# Patient Record
Sex: FEMALE | Race: WHITE | Employment: FULL TIME | URBAN - METROPOLITAN AREA
[De-identification: names, ages, dates, MRNs, and addresses within clinical notes are randomized per-mention and may not be internally consistent; named-entity substitution may affect disease eponyms.]

---

## 2017-01-10 ENCOUNTER — TELEPHONE (OUTPATIENT)
Dept: NURSING | Facility: CLINIC | Age: 47
End: 2017-01-10

## 2017-01-10 NOTE — TELEPHONE ENCOUNTER
"On 2/9/16 pt had: \"HerOption cryo- ablation with the purpose of reducing menstrual flow to a manageable amount or possibly inducing total amenorrhea.\"  Pt calling today stating she gave it almost a year to help with her symptoms. Pt states she still gets very heavy bleeding with her periods with clots, although the pt states her clots have \"lightened up a little.\" Pt states she still gets severe abdominal pain, back pain, and fatigue with her periods as well. Pt states almost every month she is having to work from home due to the heavy menstrual flow and her symptoms and she would \"like to figure something out\" and is wondering what the next step would be to help this. Note routed to Dr. Jackson to review and advise.  "

## 2017-01-10 NOTE — TELEPHONE ENCOUNTER
Called pt, informed of Dr. Jackson's note below. Pt stated understanding and was transferred to scheduling to make an appt.

## 2017-01-25 ENCOUNTER — OFFICE VISIT (OUTPATIENT)
Dept: OBGYN | Facility: CLINIC | Age: 47
End: 2017-01-25
Payer: COMMERCIAL

## 2017-01-25 VITALS
HEART RATE: 72 BPM | BODY MASS INDEX: 32.78 KG/M2 | SYSTOLIC BLOOD PRESSURE: 120 MMHG | DIASTOLIC BLOOD PRESSURE: 78 MMHG | HEIGHT: 63 IN | WEIGHT: 185 LBS

## 2017-01-25 DIAGNOSIS — Z12.4 CERVICAL CANCER SCREENING: ICD-10-CM

## 2017-01-25 DIAGNOSIS — N92.4 EXCESSIVE BLEEDING IN PREMENOPAUSAL PERIOD: Primary | ICD-10-CM

## 2017-01-25 PROCEDURE — G0145 SCR C/V CYTO,THINLAYER,RESCR: HCPCS | Performed by: OBSTETRICS & GYNECOLOGY

## 2017-01-25 PROCEDURE — 87624 HPV HI-RISK TYP POOLED RSLT: CPT | Performed by: OBSTETRICS & GYNECOLOGY

## 2017-01-25 PROCEDURE — 99214 OFFICE O/P EST MOD 30 MIN: CPT | Performed by: OBSTETRICS & GYNECOLOGY

## 2017-01-25 NOTE — PROGRESS NOTES
SUBJECTIVE:                                                   Rosemarie Michel is a 46 year old female who presents to clinic today for the following health issue(s):  Patient presents with:  Consult: Discuss hysterectomy/endometriosis      HPI:  Patient had her option ablation 1 years ago, hasn't helped much. Misses work due to cramps and heavy bleeding  Options now are to attempt mirena insertion using ultrasound guidance or proceed with Laparoscopic supracervical hysterectomy, removal of tubes and powered morcellator  We discussed that procedure in detail today  She isn't too excited about doing an iud.   She may have endometriosis, and vagina is narrow. Pap and hpv done today  Could require open DIANNA if severe adhesions found.   All risks and benefits discussed today.   Can't use ocp due to smoking.   She will let us know which way she wants to go next.    Would leave ovaries unless severe endometriosis found.     Patient's last menstrual period was 2017 (approximate)..   Patient is sexually active, .  Using condoms for contraception.    reports that she has been smoking Cigarettes.  She has smoked for the past 21 years. She does not have any smokeless tobacco history on file.    STD testing offered?  Declined    Health maintenance updated:  yes    Today's PHQ-2 Score:   PHQ-2 (  [employer]) 2017   Q1: Little interest or pleasure in doing things 0   Q2: Feeling down, depressed or hopeless 0   PHQ-2 Score 0     Today's PHQ-9 Score:   PHQ-9 SCORE 2016   Total Score 3     Today's DANII-7 Score:   DANII-7 SCORE 2016   Total Score 0       Problem list and histories reviewed & adjusted, as indicated.  Additional history: as documented.    Patient Active Problem List   Diagnosis     Hyperlipidemia LDL goal <160     Excessive bleeding in premenopausal period     No past surgical history on file.   Social History   Substance Use Topics     Smoking status: Current Every Day Smoker -- 21 years  "    Types: Cigarettes     Smokeless tobacco: Not on file      Comment: 6-7 cigs per day     Alcohol Use: 0.0 oz/week     0 Standard drinks or equivalent per week      Comment:  5 Beers Through Whole Pregnancy      Problem (# of Occurrences) Relation (Name,Age of Onset)    CANCER (1) Father (62): bladder    DIABETES (3) Mother (57): type 2, Father (60): type 2, Brother (42): type 2    Hyperlipidemia (3) Mother, Father, Paternal Grandmother            Current Outpatient Prescriptions   Medication Sig     albuterol (PROAIR HFA/PROVENTIL HFA/VENTOLIN HFA) 108 (90 BASE) MCG/ACT Inhaler Inhale 2 puffs into the lungs every 6 hours as needed for shortness of breath / dyspnea or wheezing     No current facility-administered medications for this visit.     Allergies   Allergen Reactions     Latex      Shellfish Allergy        ROS:  12 point review of systems negative other than symptoms noted below.  Constitutional: Fatigue and Weight Gain  Gastrointestinal: Abdominal Pain and Bloating  Genitourinary: Cramps, Heavy Bleeding with Period, Irregular Menses, Pelvic Pain, Significant PMS and Vaginal Discharge    OBJECTIVE:     /78 mmHg  Pulse 72  Ht 5' 2.75\" (1.594 m)  Wt 185 lb (83.915 kg)  BMI 33.03 kg/m2  LMP 01/12/2017 (Approximate)  Body mass index is 33.03 kg/(m^2).    Exam:  Constitutional:  Appearance: Well nourished, well developed alert, in no acute distress  Chest:  Respiratory Effort:  Breathing unlabored  Cardiovascular: Heart: Auscultation:  Regular rate, normal rhythm, no murmurs present  Breasts:  Inspection of Breasts:  No lymphadenopathy present; Palpation of Breasts and Axillae:  No masses present on palpation, no breast tenderness Axillary Lymph Nodes:  No lymphadenopathy present  Neurologic/Psychiatric:  Mental Status:  Oriented X3   Pelvic Exam:  External Genitalia:     Normal appearance for age, no discharge present, no tenderness present, no inflammatory lesions present, color normal  Vagina:  "    Normal vaginal vault without central or paravaginal defects, no discharge present, no inflammatory lesions present, no masses present  Bladder:     Nontender to palpation  Urethra:   Urethral Body:  Urethra palpation normal, urethra structural support normal   Urethral Meatus:  No erythema or lesions present  Cervix:     Appearance healthy, no lesions present, nontender to palpation, no bleeding present  Uterus:     Nontender to palpation, no masses present, position anteflexed, mobility: normal  Adnexa:     No adnexal tenderness present, no adnexal masses present  Perineum:     Perineum within normal limits, no evidence of trauma, no rashes or skin lesions present  Anus:     Anus within normal limits, no hemorrhoids present  Inguinal Lymph Nodes:     No lymphadenopathy present  Pubic Hair:     Normal pubic hair distribution for age  Genitalia and Groin:     No rashes present, no lesions present, no areas of discoloration, no masses present     In-Clinic Test Results:  No results found for this or any previous visit (from the past 24 hour(s)).    ASSESSMENT/PLAN:                                                        ICD-10-CM    1. Excessive bleeding in premenopausal period N92.4    2. Cervical cancer screening Z12.4 Pap imaged thin layer screen with HPV - recommended age 30 - 65     HPV High Risk Types DNA Cervical       There are no Patient Instructions on file for this visit.    Face to face time 30 minute, more than 50% counceling  MD Yolande Peres MD  [facility]

## 2017-01-25 NOTE — MR AVS SNAPSHOT
After Visit Summary   1/25/2017    Rosemarie Michel    MRN: [id number]           Patient Information     Date Of Birth          1970        Visit Information        Provider Department      1/25/2017 10:30 AM Yolande Jackson MD [facility]        Today's Diagnoses     Excessive bleeding in premenopausal period    -  1     Cervical cancer screening            Follow-ups after your visit        Your next 10 appointments already scheduled     Feb 15, 2017 10:00 AM   PHYSICAL with Yolande Jackson MD   [facility] ([facility])    [address] [place] [address] [address]  [facility] [address]   [phone]            Feb 15, 2017 10:30 AM   MA SCREENING DIGITAL BILATERAL with WEMA1   [facility] ([facility])    [address] [place] [address] [address]  [facility] [address] [phone]           Do not use any powder, lotion or deodorant under your arms or on your breast. If you do, we will ask you to remove it before your exam.  Wear comfortable, two-piece clothing.  If you have any allergies, tell your care team.  Bring any previous mammograms from other facilities or have them mailed to the breast center.              Who to contact     If you have questions or need follow up information about today's clinic visit or your schedule please contact [facility] directly at [phone].  Normal or non-critical lab and imaging results will be communicated to you by MyChart, letter or phone within 4 business days after the clinic has received the results. If you do not hear from us within 7 days, please contact the clinic through MyChart or phone. If you have a critical or abnormal lab result, we will notify you by phone as soon as possible.  Submit refill requests through [employer] or call your pharmacy and they will forward the refill request to us. Please allow 3 business days for your  "refill to be completed.          Additional Information About Your Visit        [employer] Information     [employer] gives you secure access to your electronic health record. If you see a primary care provider, you can also send messages to your care team and make appointments. If you have questions, please call your primary care clinic.  If you do not have a primary care provider, please call [phone] and they will assist you.        Care EveryWhere ID     This is your Care EveryWhere ID. This could be used by other organizations to access your [facility] medical records  [id number]        Your Vitals Were     Pulse Height BMI (Body Mass Index) Last Period          72 5' 2.75\" (1.594 m) 33.03 kg/m2 01/12/2017 (Approximate)         Blood Pressure from Last 3 Encounters:   01/25/17 120/78   12/02/16 128/72   10/21/16 128/80    Weight from Last 3 Encounters:   01/25/17 185 lb (83.915 kg)   12/02/16 185 lb (83.915 kg)   10/21/16 184 lb (83.462 kg)              We Performed the Following     HPV High Risk Types DNA Cervical     Pap imaged thin layer screen with HPV - recommended age 30 - 65        Primary Care Provider Office Phone # Fax #    Citlali Newsome PA-C [phone]       [place] [id number] [place] [facility] [address]        Thank you!     Thank you for choosing [facility] [place]  for your care. Our goal is always to provide you with excellent care. Hearing back from our patients is one way we can continue to improve our services. Please take a few minutes to complete the written survey that you may receive in the mail after your visit with us. Thank you!             Your Updated Medication List - Protect others around you: Learn how to safely use, store and throw away your medicines at www.disposemymeds.org.          This list is accurate as of: 1/25/17 12:30 PM.  Always use your most recent med list.                   Brand Name Dispense Instructions for use    " albuterol 108 (90 BASE) MCG/ACT Inhaler    PROAIR HFA/PROVENTIL HFA/VENTOLIN HFA    1 Inhaler    Inhale 2 puffs into the lungs every 6 hours as needed for shortness of breath / dyspnea or wheezing

## 2017-01-27 LAB
COPATH REPORT: NORMAL
PAP: NORMAL

## 2017-01-30 LAB
FINAL DIAGNOSIS: NORMAL
HPV HR 12 DNA CVX QL NAA+PROBE: NEGATIVE
HPV16 DNA SPEC QL NAA+PROBE: NEGATIVE
HPV18 DNA SPEC QL NAA+PROBE: NEGATIVE
SPECIMEN DESCRIPTION: NORMAL

## 2017-07-29 ENCOUNTER — HEALTH MAINTENANCE LETTER (OUTPATIENT)
Age: 47
End: 2017-07-29

## 2017-10-06 ENCOUNTER — OFFICE VISIT (OUTPATIENT)
Dept: FAMILY MEDICINE | Facility: CLINIC | Age: 47
End: 2017-10-06
Payer: COMMERCIAL

## 2017-10-06 DIAGNOSIS — R35.0 URINARY FREQUENCY: ICD-10-CM

## 2017-10-06 DIAGNOSIS — K25.3 ACUTE GASTRIC ULCER, UNSPECIFIED WHETHER GASTRIC ULCER HEMORRHAGE OR PERFORATION PRESENT: Primary | ICD-10-CM

## 2017-10-06 LAB
ALBUMIN UR-MCNC: NEGATIVE MG/DL
APPEARANCE UR: CLEAR
BACTERIA #/AREA URNS HPF: ABNORMAL /HPF
BILIRUB UR QL STRIP: NEGATIVE
COLOR UR AUTO: YELLOW
GLUCOSE UR STRIP-MCNC: NEGATIVE MG/DL
HGB BLD-MCNC: 14.9 G/DL (ref 11.7–15.7)
HGB UR QL STRIP: NEGATIVE
KETONES UR STRIP-MCNC: NEGATIVE MG/DL
LEUKOCYTE ESTERASE UR QL STRIP: ABNORMAL
NITRATE UR QL: NEGATIVE
NON-SQ EPI CELLS #/AREA URNS LPF: ABNORMAL /LPF
PH UR STRIP: 5.5 PH (ref 5–7)
RBC #/AREA URNS AUTO: ABNORMAL /HPF
SOURCE: ABNORMAL
SP GR UR STRIP: 1.02 (ref 1–1.03)
UROBILINOGEN UR STRIP-ACNC: 0.2 EU/DL (ref 0.2–1)
WBC #/AREA URNS AUTO: ABNORMAL /HPF

## 2017-10-06 PROCEDURE — 85018 HEMOGLOBIN: CPT | Performed by: PHYSICIAN ASSISTANT

## 2017-10-06 PROCEDURE — 81001 URINALYSIS AUTO W/SCOPE: CPT | Performed by: PHYSICIAN ASSISTANT

## 2017-10-06 PROCEDURE — 36415 COLL VENOUS BLD VENIPUNCTURE: CPT | Performed by: PHYSICIAN ASSISTANT

## 2017-10-06 PROCEDURE — 80053 COMPREHEN METABOLIC PANEL: CPT | Performed by: PHYSICIAN ASSISTANT

## 2017-10-06 PROCEDURE — 99213 OFFICE O/P EST LOW 20 MIN: CPT | Performed by: PHYSICIAN ASSISTANT

## 2017-10-06 RX ORDER — OMEPRAZOLE 40 MG/1
40 CAPSULE, DELAYED RELEASE ORAL DAILY
Qty: 90 CAPSULE | Refills: 1 | Status: SHIPPED | OUTPATIENT
Start: 2017-10-06 | End: 2018-02-14

## 2017-10-06 NOTE — NURSING NOTE
"Chief Complaint   Patient presents with     Abdominal Pain       Initial BP (!) 152/102  Pulse 90  Temp 98.7  F (37.1  C) (Tympanic)  Ht 5' 2.75\" (1.594 m)  Wt 181 lb (82.1 kg)  BMI 32.32 kg/m2 Estimated body mass index is 32.32 kg/(m^2) as calculated from the following:    Height as of this encounter: 5' 2.75\" (1.594 m).    Weight as of this encounter: 181 lb (82.1 kg).  Medication Reconciliation: complete     Sotero Jolly CMA    "

## 2017-10-06 NOTE — PROGRESS NOTES
"  SUBJECTIVE:   Rosemarie Michel is a 47 year old female who presents to clinic today for the following health issues:      ABDOMINAL   PAIN     Onset: 1 month     Description:   Character: Sharp and twisting pain at times, almost like a hunger pain, she sometimes will get spasms as well   Location: Right in the middle of her abdomen  Radiation: None    Intensity: moderate    Progression of Symptoms:  Worsening with the sharpness     Accompanying Signs & Symptoms:  Fever/Chills?: no   Gas/Bloating: YES- both   Nausea: no   Vomitting: no   Diarrhea?: YES- off and on   Constipation:YES- off and on   Dysuria or Hematuria: YES- feeling like she has to go all the time but then not much will come out    History:   Trauma: no   Previous similar pain: no    Previous tests done: none    Precipitating factors:   Does the pain change with:     Food: YES- the pain goes away with eating     BM: no     Urination: no     Alleviating factors:  None     Therapies Tried and outcome: Otc gas pills     LMP:  About 3 weeks        For the past 1 month has had increasing abdominal pain   Describes pain as a gnawing or twisting in the middle of her stomach   Stools have been \"all over the place\" - some loose, some firm, some black and others light brown  Having lots of gas and bloating  Food seems to actually alleviate the pain for a little while but says she \"can't eat every 20 minutes\"   No n/v  Had a similar pain she remembers 10+ years ago - was given a medication but can't remember what it was    Denies any lightheadedness or dizziness    She does take ibuprofen - has issues with her neck   Takes 3-4 ibuprofen at a time, sometimes 4 or 5 times/day  Recently had a MRI and saw a spine MD - has had injections which have helped alleviate some of the pain  Going to start an \"intense\" rehab program for her neck        Problem list and histories reviewed & adjusted, as indicated.  Additional history: as documented    Current Outpatient " "Prescriptions   Medication Sig Dispense Refill     omeprazole (PRILOSEC) 40 MG capsule Take 1 capsule (40 mg) by mouth daily Take 30-60 minutes before a meal. 90 capsule 1     ranitidine (ZANTAC) 300 MG tablet Take 1 tablet (300 mg) by mouth At Bedtime 30 tablet 1     Allergies   Allergen Reactions     Latex      Shellfish Allergy        Reviewed and updated as needed this visit by clinical staffTobacco  Allergies  Meds  Med Hx  Surg Hx  Fam Hx  Soc Hx      Reviewed and updated as needed this visit by Provider         ROS:  Remainder of ROS obtained and found to be negative other than that which was documented above      OBJECTIVE:     /88  Pulse 90  Temp 98.7  F (37.1  C) (Tympanic)  Ht 5' 2.75\" (1.594 m)  Wt 181 lb (82.1 kg)  BMI 32.32 kg/m2  Body mass index is 32.32 kg/(m^2).  GENERAL: healthy, alert and no distress  EYES: Eyes grossly normal to inspection  RESP: lungs clear to auscultation - no rales, rhonchi or wheezes  CV: regular rates and rhythm, normal S1 S2, no S3 or S4 and no murmur, click or rub  ABDOMEN: bowel sounds normal, no palpable masses. Tender over epigastric abdomen and upper abdomen, nontender over lower abdomen. No rebound    Diagnostic Test Results:  Labs    UA RESULTS:  Recent Labs   Lab Test  10/06/17   1344   COLOR  Yellow   APPEARANCE  Clear   URINEGLC  Negative   URINEBILI  Negative   URINEKETONE  Negative   SG  1.020   UBLD  Negative   URINEPH  5.5   PROTEIN  Negative   UROBILINOGEN  0.2   NITRITE  Negative   LEUKEST  Trace*   RBCU  O - 2   WBCU  O - 2         ASSESSMENT/PLAN:       ICD-10-CM    1. Acute gastric ulcer, unspecified whether gastric ulcer hemorrhage or perforation present K25.3 omeprazole (PRILOSEC) 40 MG capsule     ranitidine (ZANTAC) 300 MG tablet     Comprehensive metabolic panel (BMP + Alb, Alk Phos, ALT, AST, Total. Bili, TP)     Hemoglobin     H Pylori antigen, stool   2. Urinary frequency R35.0 *UA reflex to Microscopic and Culture ([facility] " [facility] (except [place] and [place])     Urine Microscopic     High suspicion for gastritis/gastric ulcer given history; especially concerning is her amount of ibuprofen use which is a likely contributor. She is starting rehab and has done injections so we discussed working on at the very least cutting back on the ibuprofen substantially  Will start her on prilosec and zantac with low threshold to obtain EGD  Patient to follow up in 2-3 weeks    Mervat Ulloa PA-C  [facility]      Patient Instructions   Start both the zantac and prilosec -    **Take 300mg of zantac at bedtime  **Take the 40mg of prilosec in the morning      Continue both for the first 2 weeks. If symptoms improved at 2 weeks, can stop the zantac but continue the prilosec    Cut back on the ibuprofen (advil) as much as possible      If no symptom improvement OR if any worsening, let me know right away

## 2017-10-06 NOTE — MR AVS SNAPSHOT
After Visit Summary   10/6/2017    Rosemarie Michel    MRN: [id number]           Patient Information     Date Of Birth          1970        Visit Information        Provider Department      10/6/2017 1:40 PM Mervat Ulloa PA-C [facility]        Today's Diagnoses     Urinary frequency    -  1    Acute gastric ulcer, unspecified whether gastric ulcer hemorrhage or perforation present          Care Instructions    Start both the zantac and prilosec -    **Take 300mg of zantac at bedtime  **Take the 40mg of prilosec in the morning      Continue both for the first 2 weeks. If symptoms improved at 2 weeks, can stop the zantac but continue the prilosec    Cut back on the ibuprofen (advil) as much as possible      If no symptom improvement OR if any worsening, let me know right away          Follow-ups after your visit        Who to contact     Normal or non-critical lab and imaging results will be communicated to you by [employer], letter or phone within 4 business days after the clinic has received the results. If you do not hear from us within 7 days, please contact the clinic through [employer] or phone. If you have a critical or abnormal lab result, we will notify you by phone as soon as possible.  Submit refill requests through [employer] or call your pharmacy and they will forward the refill request to us. Please allow 3 business days for your refill to be completed.          If you need to speak with a  for additional information , please call: [phone]             Additional Information About Your Visit        [employer] Information     [employer] gives you secure access to your electronic health record. If you see a primary care provider, you can also send messages to your care team and make appointments. If you have questions, please call your primary care clinic.  If you do not have a primary care provider, please call [phone] and they will assist you.    "     Care EveryWhere ID     This is your Care EveryWhere ID. This could be used by other organizations to access your [facility] medical records  [id number]        Your Vitals Were     Pulse Temperature Height BMI (Body Mass Index)          90 98.7  F (37.1  C) (Tympanic) 5' 2.75\" (1.594 m) 32.32 kg/m2         Blood Pressure from Last 3 Encounters:   10/06/17 (!) 152/102   01/25/17 120/78   12/02/16 128/72    Weight from Last 3 Encounters:   10/06/17 181 lb (82.1 kg)   01/25/17 185 lb (83.9 kg)   12/02/16 185 lb (83.9 kg)              We Performed the Following     *UA reflex to Microscopic and Culture ([facility] (except [place] and [place])     Urine Microscopic          Today's Medication Changes          These changes are accurate as of: 10/6/17  2:19 PM.  If you have any questions, ask your nurse or doctor.               Start taking these medicines.        Dose/Directions    omeprazole 40 MG capsule   Commonly known as:  priLOSEC   Used for:  Acute gastric ulcer, unspecified whether gastric ulcer hemorrhage or perforation present   Started by:  Mervat Ulloa PA-C        Dose:  40 mg   Take 1 capsule (40 mg) by mouth daily Take 30-60 minutes before a meal.   Quantity:  90 capsule   Refills:  1       ranitidine 300 MG tablet   Commonly known as:  ZANTAC   Used for:  Acute gastric ulcer, unspecified whether gastric ulcer hemorrhage or perforation present   Started by:  Mervat Ulloa PA-C        Dose:  300 mg   Take 1 tablet (300 mg) by mouth At Bedtime   Quantity:  30 tablet   Refills:  1            Where to get your medicines      These medications were sent to [facility] - [address]  [address] [place] [address]     Phone:  [phone]     omeprazole 40 MG capsule    ranitidine 300 MG tablet                Primary Care Provider Office Phone # Fax #    Citlali Newsome PA-C [phone]       " [address] [place] [facility] [address]        Equal Access to Services     OSCAR SCOTT : Hadii aad ku hadbetsyjenn Moran, waaxda luwillieadaha, qaybta florlewemily florez, juan f zavalagonzaloblanca mckeon. So [facility] [phone].    ATENCIÓN: Si habla español, tiene a holliday disposición servicios gratuitos de asistencia lingüística. Llame al [phone].    We comply with applicable federal civil rights laws and [place] laws. We do not discriminate on the basis of race, color, national origin, age, disability, sex, sexual orientation, or gender identity.            Thank you!     Thank you for choosing [facility]  for your care. Our goal is always to provide you with excellent care. Hearing back from our patients is one way we can continue to improve our services. Please take a few minutes to complete the written survey that you may receive in the mail after your visit with us. Thank you!             Your Updated Medication List - Protect others around you: Learn how to safely use, store and throw away your medicines at www.disposemymeds.org.          This list is accurate as of: 10/6/17  2:19 PM.  Always use your most recent med list.                   Brand Name Dispense Instructions for use Diagnosis    omeprazole 40 MG capsule    priLOSEC    90 capsule    Take 1 capsule (40 mg) by mouth daily Take 30-60 minutes before a meal.    Acute gastric ulcer, unspecified whether gastric ulcer hemorrhage or perforation present       ranitidine 300 MG tablet    ZANTAC    30 tablet    Take 1 tablet (300 mg) by mouth At Bedtime    Acute gastric ulcer, unspecified whether gastric ulcer hemorrhage or perforation present

## 2017-10-06 NOTE — PATIENT INSTRUCTIONS
Start both the zantac and prilosec -    **Take 300mg of zantac at bedtime  **Take the 40mg of prilosec in the morning      Continue both for the first 2 weeks. If symptoms improved at 2 weeks, can stop the zantac but continue the prilosec    Cut back on the ibuprofen (advil) as much as possible      If no symptom improvement OR if any worsening, let me know right away

## 2017-10-07 VITALS
BODY MASS INDEX: 32.07 KG/M2 | HEIGHT: 63 IN | DIASTOLIC BLOOD PRESSURE: 88 MMHG | WEIGHT: 181 LBS | TEMPERATURE: 98.7 F | SYSTOLIC BLOOD PRESSURE: 142 MMHG | HEART RATE: 90 BPM

## 2017-10-07 LAB
ALBUMIN SERPL-MCNC: 4.2 G/DL (ref 3.4–5)
ALP SERPL-CCNC: 66 U/L (ref 40–150)
ALT SERPL W P-5'-P-CCNC: 22 U/L (ref 0–50)
ANION GAP SERPL CALCULATED.3IONS-SCNC: 6 MMOL/L (ref 3–14)
AST SERPL W P-5'-P-CCNC: 18 U/L (ref 0–45)
BILIRUB SERPL-MCNC: 0.4 MG/DL (ref 0.2–1.3)
BUN SERPL-MCNC: 13 MG/DL (ref 7–30)
CALCIUM SERPL-MCNC: 8.9 MG/DL (ref 8.5–10.1)
CHLORIDE SERPL-SCNC: 103 MMOL/L (ref 94–109)
CO2 SERPL-SCNC: 26 MMOL/L (ref 20–32)
CREAT SERPL-MCNC: 0.6 MG/DL (ref 0.52–1.04)
GFR SERPL CREATININE-BSD FRML MDRD: >90 ML/MIN/1.7M2
GLUCOSE SERPL-MCNC: 80 MG/DL (ref 70–99)
POTASSIUM SERPL-SCNC: 4.1 MMOL/L (ref 3.4–5.3)
PROT SERPL-MCNC: 7.7 G/DL (ref 6.8–8.8)
SODIUM SERPL-SCNC: 135 MMOL/L (ref 133–144)

## 2017-10-09 DIAGNOSIS — K25.3 ACUTE GASTRIC ULCER, UNSPECIFIED WHETHER GASTRIC ULCER HEMORRHAGE OR PERFORATION PRESENT: ICD-10-CM

## 2017-10-09 PROCEDURE — 87338 HPYLORI STOOL AG IA: CPT | Performed by: PHYSICIAN ASSISTANT

## 2017-10-10 LAB
H PYLORI AG STL QL IA: NORMAL
SPECIMEN SOURCE: NORMAL

## 2017-10-20 ENCOUNTER — TELEPHONE (OUTPATIENT)
Dept: FAMILY MEDICINE | Facility: CLINIC | Age: 47
End: 2017-10-20

## 2017-10-20 NOTE — TELEPHONE ENCOUNTER
Patient reports:  She was prescribed two medications 10/6/17 for her ulcer  She can not recall what she was supposed to stop taking after a few weeks    Notified patient of notes for patient instructions OV 10/6/17:  Patient Instructions   Start both the zantac and prilosec -     **Take 300mg of zantac at bedtime  **Take the 40mg of prilosec in the morning        Continue both for the first 2 weeks. If symptoms improved at 2 weeks, can stop the zantac but continue the prilosec     Cut back on the ibuprofen (advil) as much as possible        If no symptom improvement OR if any worsening, let me know right away    Patient verbalized understanding and reports her symptoms are much better  She is going to stop taking the Zantac  She will continue using prilosec  She will report back to the clinic in the next week or two to let the provider know how she is doing.    Marlen ROLDAN Rn

## 2017-10-20 NOTE — TELEPHONE ENCOUNTER
Reason for Call:  Other prescription    Detailed comments: Rosemarie LEFT MESSAGE:  She states that she was prescripted 2 medications (did not state what they were) and she believes she was suppose to stop taking one of them and then wondering if she is suppose to continue with the other one (?).  Please call and assess. Thank you..Eliane Fierro    Phone Number Patient can be reached at: Home number on file [phone] (home)    Best Time: did not specify in message    Can we leave a detailed message on this number? did not specify in message    Call taken on 10/20/2017 at 1:48 PM by Eliane Fierro

## 2017-10-26 ENCOUNTER — OFFICE VISIT (OUTPATIENT)
Dept: FAMILY MEDICINE | Facility: CLINIC | Age: 47
End: 2017-10-26
Payer: COMMERCIAL

## 2017-10-26 VITALS
BODY MASS INDEX: 33.79 KG/M2 | TEMPERATURE: 99.9 F | HEART RATE: 92 BPM | SYSTOLIC BLOOD PRESSURE: 132 MMHG | WEIGHT: 190.7 LBS | HEIGHT: 63 IN | DIASTOLIC BLOOD PRESSURE: 82 MMHG

## 2017-10-26 DIAGNOSIS — H81.11 BENIGN PAROXYSMAL POSITIONAL VERTIGO OF RIGHT EAR: Primary | ICD-10-CM

## 2017-10-26 LAB — HGB BLD-MCNC: 14.4 G/DL (ref 11.7–15.7)

## 2017-10-26 PROCEDURE — 99214 OFFICE O/P EST MOD 30 MIN: CPT | Performed by: PHYSICIAN ASSISTANT

## 2017-10-26 PROCEDURE — 85018 HEMOGLOBIN: CPT | Performed by: PHYSICIAN ASSISTANT

## 2017-10-26 PROCEDURE — 36415 COLL VENOUS BLD VENIPUNCTURE: CPT | Performed by: PHYSICIAN ASSISTANT

## 2017-10-26 PROCEDURE — 80048 BASIC METABOLIC PNL TOTAL CA: CPT | Performed by: PHYSICIAN ASSISTANT

## 2017-10-26 PROCEDURE — 92567 TYMPANOMETRY: CPT | Performed by: PHYSICIAN ASSISTANT

## 2017-10-26 RX ORDER — FLUTICASONE PROPIONATE 50 MCG
1-2 SPRAY, SUSPENSION (ML) NASAL DAILY
Qty: 1 BOTTLE | Refills: 1 | Status: SHIPPED | OUTPATIENT
Start: 2017-10-26 | End: 2018-01-19

## 2017-10-26 NOTE — PATIENT INSTRUCTIONS
start flonase  See physical therapy  Follow up if worsening, not improving  Benign Paroxysmal Positional Vertigo     Your health care provider may move your head in certain ways to treat your BPPV.     Benign paroxysmal positional vertigo (BPPV) is a problem with the inner ear. The inner ear contains the vestibular system. This system is what helps you keep your balance. BPPV causes a feeling of spinning. It is a common problem of the vestibular system.  Understanding the vestibular system  The vestibular system of the ear is made up of very tiny parts. They include the utricle, saccule, and semicircular canals. The utricle is a tiny organ that contains calcium crystals. In some people, the crystals can move into the semicircular canals. When this happens, the system no longer works as it should. This causes BPPV. Benign means it is not life threatening. Paroxysmal means it happens suddenly. Positional means that it happens when you move your head. Vertigo is a feeling of spinning.  What causes BPPV?  Causes include injury to your head or neck. Other problems with the vestibular system may cause BPPV. In many people, the cause of BPPV is not known.  Symptoms of BPPV  You many have repeated feelings of spinning (vertigo). The vertigo usually lasts less than 1 minute. Some movements, such as rolling over in bed, can bring on vertigo.  Diagnosing BPPV  Your primary healthcare provider may diagnose and treat your BPPV. Or you may see an ear, nose, and throat doctor (otolaryngologist). In some cases, you may see a nervous system doctor (neurologist).  The healthcare provider will ask about your symptoms and your medical history. He or she will examine you. You may have hearing and balance tests. As part of the exam, your healthcare provider may have you move your head and body in certain ways. If you have BPPV, the movements can bring on vertigo. Your provider will also look for abnormal movements of your eyes. You may  have other tests to check your vestibular or nervous systems.  Treatment for BPPV  Your healthcare provider may try to move the calcium crystals. This is done by having you move your head and neck in certain ways. This treatment is safe and often works well. You may also be told to do these movements at home. You may still have vertigo for a few weeks. Your healthcare provider will recheck your symptoms, usually in about a month. Special physical therapy may also be part of treatment. In rare cases, surgery may be needed for BPPV that does not go away.     When to call the healthcare provider  Call your healthcare provider right away if you have any of these:    Symptoms that do not go away with treatment    Symptoms that get worse    New symptoms   Date Last Reviewed: 5/1/2017 2000-2017 [employer]. [address] [place] [address], [place] [address]. All rights reserved. This information is not intended as a substitute for professional medical care. Always follow your healthcare professional's instructions.

## 2017-10-26 NOTE — MR AVS SNAPSHOT
After Visit Summary   10/26/2017    Rosemarie Michel    MRN: [id number]           Patient Information     Date Of Birth          1970        Visit Information        Provider Department      10/26/2017 3:20 PM Mile Sanchez PA-C [facility]        Today's Diagnoses     Benign paroxysmal positional vertigo of right ear    -  1      Care Instructions    start flonase  See physical therapy  Follow up if worsening, not improving  Benign Paroxysmal Positional Vertigo     Your health care provider may move your head in certain ways to treat your BPPV.     Benign paroxysmal positional vertigo (BPPV) is a problem with the inner ear. The inner ear contains the vestibular system. This system is what helps you keep your balance. BPPV causes a feeling of spinning. It is a common problem of the vestibular system.  Understanding the vestibular system  The vestibular system of the ear is made up of very tiny parts. They include the utricle, saccule, and semicircular canals. The utricle is a tiny organ that contains calcium crystals. In some people, the crystals can move into the semicircular canals. When this happens, the system no longer works as it should. This causes BPPV. Benign means it is not life threatening. Paroxysmal means it happens suddenly. Positional means that it happens when you move your head. Vertigo is a feeling of spinning.  What causes BPPV?  Causes include injury to your head or neck. Other problems with the vestibular system may cause BPPV. In many people, the cause of BPPV is not known.  Symptoms of BPPV  You many have repeated feelings of spinning (vertigo). The vertigo usually lasts less than 1 minute. Some movements, such as rolling over in bed, can bring on vertigo.  Diagnosing BPPV  Your primary healthcare provider may diagnose and treat your BPPV. Or you may see an ear, nose, and throat doctor (otolaryngologist). In some cases, you may see a nervous system doctor  (neurologist).  The healthcare provider will ask about your symptoms and your medical history. He or she will examine you. You may have hearing and balance tests. As part of the exam, your healthcare provider may have you move your head and body in certain ways. If you have BPPV, the movements can bring on vertigo. Your provider will also look for abnormal movements of your eyes. You may have other tests to check your vestibular or nervous systems.  Treatment for BPPV  Your healthcare provider may try to move the calcium crystals. This is done by having you move your head and neck in certain ways. This treatment is safe and often works well. You may also be told to do these movements at home. You may still have vertigo for a few weeks. Your healthcare provider will recheck your symptoms, usually in about a month. Special physical therapy may also be part of treatment. In rare cases, surgery may be needed for BPPV that does not go away.     When to call the healthcare provider  Call your healthcare provider right away if you have any of these:    Symptoms that do not go away with treatment    Symptoms that get worse    New symptoms   Date Last Reviewed: 5/1/2017 2000-2017 [employer]. [address] [place] [address]. All rights reserved. This information is not intended as a substitute for professional medical care. Always follow your healthcare professional's instructions.                Follow-ups after your visit        Additional Services     [facility] PT, HAND, AND CHIROPRACTIC REFERRAL       **This order will print in the [facility] Scheduling Office**    Physical Therapy, Hand Therapy and Chiropractic Care are available through:    *[facility]  *[facility]  *[facility]    Call one number to schedule at any of the above locations: [phone].    Your provider has referred you to: Physical Therapy at [facility] or [facility] [place] [place]    Indication/Reason for  Referral: bppv/dizziness  Onset of Illness: 2 weeks. Has had flares each year  Therapy Orders: Evaluate and Treat  Special Programs: None and treat BPPV  Special Request: None    Deandra Liriano      Additional Comments for the Therapist or Chiropractor: none    Please be aware that coverage of these services is subject to the terms and limitations of your health insurance plan.  Call member services at your health plan with any benefit or coverage questions.      Please bring the following to your appointment:    *Your personal calendar for scheduling future appointments  *Comfortable clothing                  Who to contact     Normal or non-critical lab and imaging results will be communicated to you by [employer], letter or phone within 4 business days after the clinic has received the results. If you do not hear from us within 7 days, please contact the clinic through [employer] or phone. If you have a critical or abnormal lab result, we will notify you by phone as soon as possible.  Submit refill requests through [employer] or call your pharmacy and they will forward the refill request to us. Please allow 3 business days for your refill to be completed.          If you need to speak with a  for additional information , please call: [phone]             Additional Information About Your Visit        [employer] Information     [employer] gives you secure access to your electronic health record. If you see a primary care provider, you can also send messages to your care team and make appointments. If you have questions, please call your primary care clinic.  If you do not have a primary care provider, please call [phone] and they will assist you.        Care EveryWhere ID     This is your Care EveryWhere ID. This could be used by other organizations to access your [facility] medical records  [id number]        Your Vitals Were     Pulse Temperature Height Last Period BMI (Body Mass Index)       92 99.9  " F (37.7  C) (Tympanic) 5' 2.75\" (1.594 m) 10/01/2017 34.05 kg/m2        Blood Pressure from Last 3 Encounters:   10/26/17 132/82   10/07/17 142/88   01/25/17 120/78    Weight from Last 3 Encounters:   10/26/17 190 lb 11.2 oz (86.5 kg)   10/06/17 181 lb (82.1 kg)   01/25/17 185 lb (83.9 kg)              We Performed the Following     Basic metabolic panel     Hemoglobin     [facility] PT, HAND, AND CHIROPRACTIC REFERRAL          Today's Medication Changes          These changes are accurate as of: 10/26/17  4:30 PM.  If you have any questions, ask your nurse or doctor.               Start taking these medicines.        Dose/Directions    fluticasone 50 MCG/ACT spray   Commonly known as:  FLONASE   Used for:  Benign paroxysmal positional vertigo of right ear   Started by:  Mile Sanchez PA-C        Dose:  1-2 spray   Spray 1-2 sprays into both nostrils daily   Quantity:  1 Bottle   Refills:  1            Where to get your medicines      These medications were sent to [facility] [place] - [address] [facility] [address]  [address] [facility] [place] [address]     Phone:  [phone]     fluticasone 50 MCG/ACT spray                Primary Care Provider Office Phone # Fax #    Citlali Aury Newsome PA-C [phone] [address] [place] [address]        Equal Access to Services     [facility]: Hadii aad ku hadasho Soomaali, waaxda luqadaha, qaybta kaalmada adeegyada, waxay brandy mckeon. So [facility] [phone].    ATENCIÓN: Si habla español, tiene a holliday disposición servicios gratuitos de asistencia lingüística. Llame al [phone].    We comply with applicable federal civil rights laws and [place] laws. We do not discriminate on the basis of race, color, national origin, age, disability, sex, sexual orientation, or gender identity.            Thank you!     Thank you for choosing [facility]  for your care. Our goal is always to provide you with " excellent care. Hearing back from our patients is one way we can continue to improve our services. Please take a few minutes to complete the written survey that you may receive in the mail after your visit with us. Thank you!             Your Updated Medication List - Protect others around you: Learn how to safely use, store and throw away your medicines at www.disposemymeds.org.          This list is accurate as of: 10/26/17  4:30 PM.  Always use your most recent med list.                   Brand Name Dispense Instructions for use Diagnosis    fluticasone 50 MCG/ACT spray    FLONASE    1 Bottle    Spray 1-2 sprays into both nostrils daily    Benign paroxysmal positional vertigo of right ear       omeprazole 40 MG capsule    priLOSEC    90 capsule    Take 1 capsule (40 mg) by mouth daily Take 30-60 minutes before a meal.    Acute gastric ulcer, unspecified whether gastric ulcer hemorrhage or perforation present
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
## 2017-10-27 ENCOUNTER — THERAPY VISIT (OUTPATIENT)
Dept: PHYSICAL THERAPY | Facility: CLINIC | Age: 47
End: 2017-10-27
Payer: COMMERCIAL

## 2017-10-27 DIAGNOSIS — H81.10 BPPV (BENIGN PAROXYSMAL POSITIONAL VERTIGO): Primary | ICD-10-CM

## 2017-10-27 LAB
ANION GAP SERPL CALCULATED.3IONS-SCNC: 6 MMOL/L (ref 3–14)
BUN SERPL-MCNC: 13 MG/DL (ref 7–30)
CALCIUM SERPL-MCNC: 9.1 MG/DL (ref 8.5–10.1)
CHLORIDE SERPL-SCNC: 103 MMOL/L (ref 94–109)
CO2 SERPL-SCNC: 26 MMOL/L (ref 20–32)
CREAT SERPL-MCNC: 0.73 MG/DL (ref 0.52–1.04)
GFR SERPL CREATININE-BSD FRML MDRD: 86 ML/MIN/1.7M2
GLUCOSE SERPL-MCNC: 98 MG/DL (ref 70–99)
POTASSIUM SERPL-SCNC: 4.2 MMOL/L (ref 3.4–5.3)
SODIUM SERPL-SCNC: 135 MMOL/L (ref 133–144)

## 2017-10-27 PROCEDURE — 97161 PT EVAL LOW COMPLEX 20 MIN: CPT | Mod: GP | Performed by: PHYSICAL THERAPIST

## 2017-10-27 PROCEDURE — 97112 NEUROMUSCULAR REEDUCATION: CPT | Mod: GP | Performed by: PHYSICAL THERAPIST

## 2017-10-27 NOTE — MR AVS SNAPSHOT
After Visit Summary   10/27/2017    Rosemarie Michel    MRN: [id number]           Patient Information     Date Of Birth          1970        Visit Information        Provider Department      10/27/2017 10:50 AM Kasi Parkinson PT [facility]        Today's Diagnoses     BPPV (benign paroxysmal positional vertigo)    -  1       Follow-ups after your visit        Your next 10 appointments already scheduled     Oct 30, 2017  9:40 AM CDT   [facility] Spine with Anna Ulloa PT   [facility]    [address] [place] [id number] [phone]              Who to contact     If you have questions or need follow up information about today's clinic visit or your schedule please contact [facility] directly at [phone].  Normal or non-critical lab and imaging results will be communicated to you by [employer], letter or phone within 4 business days after the clinic has received the results. If you do not hear from us within 7 days, please contact the clinic through MyChart or phone. If you have a critical or abnormal lab result, we will notify you by phone as soon as possible.  Submit refill requests through [employer] or call your pharmacy and they will forward the refill request to us. Please allow 3 business days for your refill to be completed.          Additional Information About Your Visit        MyChart Information     [employer] gives you secure access to your electronic health record. If you see a primary care provider, you can also send messages to your care team and make appointments. If you have questions, please call your primary care clinic.  If you do not have a primary care provider, please call [phone] and they will assist you.        Care EveryWhere ID     This is your Care EveryWhere ID. This could be used by other organizations to access your [facility] medical records  [id number]        Your Vitals Were     Last Period                    10/01/2017            Blood Pressure from Last 3 Encounters:   10/26/17 132/82   10/07/17 142/88   01/25/17 120/78    Weight from Last 3 Encounters:   10/26/17 86.5 kg (190 lb 11.2 oz)   10/06/17 82.1 kg (181 lb)   01/25/17 83.9 kg (185 lb)              We Performed the Following     HC PT EVAL, LOW COMPLEXITY     [facility] INITIAL EVAL REPORT     NEUROMUSCULAR RE-EDUCATION        Primary Care Provider Office Phone # Fax #    Citlali Aury Newsome PA-C [phone] [address] [place] [address]        Equal Access to Services     [facility]: Hadii aad ku hadasho Soomaali, waaxda luqadaha, qaybta kaalmada adeegyada, waxay idiin hayginnan sandi sosa . So [facility] [phone].    ATENCIÓN: Si habla español, tiene a holliday disposición servicios gratuitos de asistencia lingüística. Llame al [phone].    We comply with applicable federal civil rights laws and [place] laws. We do not discriminate on the basis of race, color, national origin, age, disability, sex, sexual orientation, or gender identity.            Thank you!     Thank you for choosing [facility]  for your care. Our goal is always to provide you with excellent care. Hearing back from our patients is one way we can continue to improve our services. Please take a few minutes to complete the written survey that you may receive in the mail after your visit with us. Thank you!             Your Updated Medication List - Protect others around you: Learn how to safely use, store and throw away your medicines at www.disposemymeds.org.          This list is accurate as of: 10/27/17 11:40 AM.  Always use your most recent med list.                   Brand Name Dispense Instructions for use Diagnosis    fluticasone 50 MCG/ACT spray    FLONASE    1 Bottle    Spray 1-2 sprays into both nostrils daily    Benign paroxysmal positional vertigo of right ear       omeprazole 40 MG capsule    priLOSEC    90 capsule    Take 1 capsule (40 mg) by  mouth daily Take 30-60 minutes before a meal.    Acute gastric ulcer, unspecified whether gastric ulcer hemorrhage or perforation present
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
## 2018-01-19 ENCOUNTER — OFFICE VISIT (OUTPATIENT)
Dept: FAMILY MEDICINE | Facility: CLINIC | Age: 48
End: 2018-01-19
Payer: COMMERCIAL

## 2018-01-19 VITALS
HEART RATE: 99 BPM | DIASTOLIC BLOOD PRESSURE: 89 MMHG | BODY MASS INDEX: 31.92 KG/M2 | WEIGHT: 187 LBS | TEMPERATURE: 100.3 F | SYSTOLIC BLOOD PRESSURE: 132 MMHG | HEIGHT: 64 IN

## 2018-01-19 DIAGNOSIS — J01.90 ACUTE SINUSITIS WITH SYMPTOMS > 10 DAYS: Primary | ICD-10-CM

## 2018-01-19 DIAGNOSIS — H81.11 BENIGN PAROXYSMAL POSITIONAL VERTIGO OF RIGHT EAR: ICD-10-CM

## 2018-01-19 PROCEDURE — 99214 OFFICE O/P EST MOD 30 MIN: CPT | Performed by: PHYSICIAN ASSISTANT

## 2018-01-19 RX ORDER — FLUTICASONE PROPIONATE 50 MCG
1-2 SPRAY, SUSPENSION (ML) NASAL DAILY
Qty: 1 BOTTLE | Refills: 1 | Status: SHIPPED | OUTPATIENT
Start: 2018-01-19 | End: 2018-10-30

## 2018-01-19 NOTE — PROGRESS NOTES
SUBJECTIVE:   Rosemarie Michel is a 47 year old female who presents to clinic today for the following health issues:      ENT Symptoms             Symptoms: cc Present Absent Comment   Fever/Chills  x     Fatigue  x     Muscle Aches  x     Eye Irritation  x  Burning, water   Sneezing  x     Nasal Eric/Drg  x     Sinus Pressure/Pain  x     Loss of smell   x    Dental pain  x     Sore Throat   x    Swollen Glands   x    Ear Pain/Fullness  x     Cough   x    Wheeze   x    Chest Pain   x    Shortness of breath   x    Rash   x    Other         Symptom duration:  two weeks ago and now Monday   Symptom severity:  mild   Treatments tried:  Theraflu (tea) Sudafed, Emerge C   Contacts:  family       C/o sinus pressure/pain and tooth pain.  Not on flonase currently.          Problem list and histories reviewed & adjusted, as indicated.  Additional history: as documented    Patient Active Problem List   Diagnosis     Hyperlipidemia LDL goal <160     Excessive bleeding in premenopausal period     BPPV (benign paroxysmal positional vertigo)     History reviewed. No pertinent surgical history.    Social History   Substance Use Topics     Smoking status: Current Every Day Smoker     Years: 21.00     Types: Cigarettes     Smokeless tobacco: Never Used      Comment: 6-7 cigs per day     Alcohol use 0.0 oz/week     0 Standard drinks or equivalent per week      Comment:  5 Beers Through Whole Pregnancy     Family History   Problem Relation Age of Onset     DIABETES Mother 57     type 2     Hyperlipidemia Mother      DIABETES Father 60     type 2     CANCER Father 62     bladder     Hyperlipidemia Father      DIABETES Brother 42     type 2     Hyperlipidemia Paternal Grandmother          Current Outpatient Prescriptions   Medication Sig Dispense Refill     fluticasone (FLONASE) 50 MCG/ACT spray Spray 1-2 sprays into both nostrils daily 1 Bottle 1     amoxicillin-clavulanate (AUGMENTIN) 875-125 MG per tablet Take 1 tablet by mouth 2  "times daily 20 tablet 0     omeprazole (PRILOSEC) 40 MG capsule Take 1 capsule (40 mg) by mouth daily Take 30-60 minutes before a meal. 90 capsule 1     BP Readings from Last 3 Encounters:   01/19/18 132/89   10/26/17 132/82   10/07/17 142/88    Wt Readings from Last 3 Encounters:   01/19/18 187 lb (84.8 kg)   10/26/17 190 lb 11.2 oz (86.5 kg)   10/06/17 181 lb (82.1 kg)                        Reviewed and updated as needed this visit by clinical staffTobacco  Allergies  Meds  Med Hx  Surg Hx  Fam Hx  Soc Hx      Reviewed and updated as needed this visit by Provider         ROS:  Constitutional, HEENT, cardiovascular, pulmonary, GI, , musculoskeletal, neuro, skin, endocrine and psych systems are negative, except as otherwise noted.      OBJECTIVE:   /89  Pulse 99  Temp 100.3  F (37.9  C) (Tympanic)  Ht 5' 3.75\" (1.619 m)  Wt 187 lb (84.8 kg)  BMI 32.35 kg/m2  Body mass index is 32.35 kg/(m^2).  GENERAL: healthy, alert and no distress  EYES: Eyes grossly normal to inspection, PERRL and conjunctivae and sclerae normal  HENT: ear canals and TM's normal, nose and mouth without ulcers or lesions, tenderness over bilateral maxillary sinuses  NECK: no adenopathy, no asymmetry, masses, or scars and thyroid normal to palpation  RESP: lungs clear to auscultation - no rales, rhonchi or wheezes  CV: regular rate and rhythm, normal S1 S2, no S3 or S4, no murmur, click or rub, no peripheral edema and peripheral pulses strong  ABDOMEN: soft, nontender, no hepatosplenomegaly, no masses and bowel sounds normal  MS: no gross musculoskeletal defects noted, no edema    Diagnostic Test Results:  none     ASSESSMENT/PLAN:         1. Acute sinusitis with symptoms > 10 days  SINUSITIS    * Antibiotics indicated, see orders.  * I discussed the pathophysiology of sinus pressure/sinusitis and treatment options with emphasis on healthy lifestyle and opening up natural drainage passages.  I discussed the risks and benefits of " various over the counter and prescription treatments including short courses of decongestant nasal sprays.  If new, worsening or persistent symptoms, the patient is to call or return for a recheck.          - amoxicillin-clavulanate (AUGMENTIN) 875-125 MG per tablet; Take 1 tablet by mouth 2 times daily  Dispense: 20 tablet; Refill: 0    2. Benign paroxysmal positional vertigo of right ear  Continue with home maneuvers and OTC antivert.    - fluticasone (FLONASE) 50 MCG/ACT spray; Spray 1-2 sprays into both nostrils daily  Dispense: 1 Bottle; Refill: 1    FUTURE APPOINTMENTS:       - Follow-up visit if symptoms worsen or fail to improve as anticipated.     Neyda Rao PA-C  [facility]

## 2018-01-19 NOTE — MR AVS SNAPSHOT
"              After Visit Summary   1/19/2018    Rosemarie Michel    MRN: [id number]           Patient Information     Date Of Birth          1970        Visit Information        Provider Department      1/19/2018 10:00 AM Neyda Rao PA-C [facility]        Today's Diagnoses     Acute sinusitis with symptoms > 10 days    -  1    Benign paroxysmal positional vertigo of right ear           Follow-ups after your visit        Your next 10 appointments already scheduled     Feb 14, 2018 11:10 AM CST   PHYSICAL with Yolande Jackson MD   [facility] [place] [facility] ([facility]    [address]  [address]  [facility] [address]   [phone]            Feb 14, 2018 11:30 AM CST   (Arrive by 11:15 AM)   MA SCREENING DIGITAL BILATERAL with WEMA1   [facility] [place] ([facility] [place])    [address], [address]  [facility] [place] [address]   [phone]           Do not use any powder, lotion or deodorant under your arms or on your breast. If you do, we will ask you to remove it before your exam.  Wear comfortable, two-piece clothing.  If you have any allergies, tell your care team.  Bring any previous mammograms from other facilities or have them mailed to the breast center. Three-dimensional (3D) mammograms are available at [place] locations in [facility], [place], [place], [facility], [place], [place], and [place]. [facility] locations include [place] and [facility] in [place]. Benefits of 3D mammograms include: - Improved rate of cancer detection - Decreases your chance of having to go back for more tests, which means fewer: - \"False-positive\" results (This means that there is an abnormal area but it isn't cancer.) - Invasive testing procedures, such as a biopsy or surgery - Can provide clearer images of the breast if you have dense breast tissue. 3D mammography is an optional exam that " "anyone can have with a 2D mammogram. It doesn't replace or take the place of a 2D mammogram. 2D mammograms remain an effective screening test for all women.  Not all insurance companies cover the cost of a 3D mammogram. Check with your insurance.              Who to contact     Normal or non-critical lab and imaging results will be communicated to you by O[employer] (?????)hart, letter or phone within 4 business days after the clinic has received the results. If you do not hear from us within 7 days, please contact the clinic through [employer] or phone. If you have a critical or abnormal lab result, we will notify you by phone as soon as possible.  Submit refill requests through [employer] or call your pharmacy and they will forward the refill request to us. Please allow 3 business days for your refill to be completed.          If you need to speak with a  for additional information , please call: [phone]           Additional Information About Your Visit        BarkBox Information     [employer]Box gives you secure access to your electronic health record. If you see a primary care provider, you can also send messages to your care team and make appointments. If you have questions, please call your primary care clinic.  If you do not have a primary care provider, please call [phone] and they will assist you.        Care EveryWhere ID     This is your Care EveryWhere ID. This could be used by other organizations to access your [place] medical records  [id number]        Your Vitals Were     Pulse Temperature Height BMI (Body Mass Index)          99 100.3  F (37.9  C) (Tympanic) 5' 3.75\" (1.619 m) 32.35 kg/m2         Blood Pressure from Last 3 Encounters:   01/19/18 132/89   10/26/17 132/82   10/07/17 142/88    Weight from Last 3 Encounters:   01/19/18 187 lb (84.8 kg)   10/26/17 190 lb 11.2 oz (86.5 kg)   10/06/17 181 lb (82.1 kg)              Today, you had the following     No orders found for display       "   Today's Medication Changes          These changes are accurate as of: 1/19/18 10:21 AM.  If you have any questions, ask your nurse or doctor.               Start taking these medicines.        Dose/Directions    amoxicillin-clavulanate 875-125 MG per tablet   Commonly known as:  AUGMENTIN   Used for:  Acute sinusitis with symptoms > 10 days   Started by:  Neyda Rao PA-C        Dose:  1 tablet   Take 1 tablet by mouth 2 times daily   Quantity:  20 tablet   Refills:  0            Where to get your medicines      These medications were sent to [facility] - [place], [place] - [address]  [address] [facility] [address]     Phone:  [phone]     amoxicillin-clavulanate 875-125 MG per tablet    fluticasone 50 MCG/ACT spray                Primary Care Provider Office Phone # Fax #    Citlali Aury Newsome PA-C [phone] [address]  [place] [address]        Equal Access to Services     [facility] AH: Hadii aad ku hadasho Soomaali, waaxda luqadaha, qaybta kaalmada adeegyada, waxay idiin hayginnan sandi sosa . So [facility] [phone].    ATENCIÓN: Si habla español, tiene a holliday disposición servicios gratuitos de asistencia lingüística. LlSelect [facility]ty [facility] [phone].    We comply with applicable federal civil rights laws and [place] laws. We do not discriminate on the basis of race, color, national origin, age, disability, sex, sexual orientation, or gender identity.            Thank you!     Thank you for choosing [facility]  for your care. Our goal is always to provide you with excellent care. Hearing back from our patients is one way we can continue to improve our services. Please take a few minutes to complete the written survey that you may receive in the mail after your visit with us. Thank you!             Your Updated Medication List - Protect others around you: Learn how to safely use, store and throw away your medicines at  www.disposemymeds.org.          This list is accurate as of: 1/19/18 10:21 AM.  Always use your most recent med list.                   Brand Name Dispense Instructions for use Diagnosis    amoxicillin-clavulanate 875-125 MG per tablet    AUGMENTIN    20 tablet    Take 1 tablet by mouth 2 times daily    Acute sinusitis with symptoms > 10 days       fluticasone 50 MCG/ACT spray    FLONASE    1 Bottle    Spray 1-2 sprays into both nostrils daily    Benign paroxysmal positional vertigo of right ear       omeprazole 40 MG capsule    priLOSEC    90 capsule    Take 1 capsule (40 mg) by mouth daily Take 30-60 minutes before a meal.    Acute gastric ulcer, unspecified whether gastric ulcer hemorrhage or perforation present

## 2018-01-19 NOTE — NURSING NOTE
"Chief Complaint   Patient presents with     Sinus Problem       Initial Temp 100.3  F (37.9  C) (Tympanic)  Ht 5' 3.75\" (1.619 m)  Wt 187 lb (84.8 kg)  BMI 32.35 kg/m2 Estimated body mass index is 32.35 kg/(m^2) as calculated from the following:    Height as of this encounter: 5' 3.75\" (1.619 m).    Weight as of this encounter: 187 lb (84.8 kg).  Medication Reconciliation: complete   ELIANA De La Cruz        "

## 2018-01-31 ENCOUNTER — OFFICE VISIT (OUTPATIENT)
Dept: FAMILY MEDICINE | Facility: CLINIC | Age: 48
End: 2018-01-31
Payer: COMMERCIAL

## 2018-01-31 VITALS
HEIGHT: 64 IN | TEMPERATURE: 100.2 F | HEART RATE: 108 BPM | BODY MASS INDEX: 32.33 KG/M2 | SYSTOLIC BLOOD PRESSURE: 150 MMHG | WEIGHT: 189.4 LBS | DIASTOLIC BLOOD PRESSURE: 103 MMHG

## 2018-01-31 DIAGNOSIS — K11.20 SIALADENITIS: Primary | ICD-10-CM

## 2018-01-31 DIAGNOSIS — I10 HYPERTENSION GOAL BP (BLOOD PRESSURE) < 140/90: ICD-10-CM

## 2018-01-31 PROCEDURE — 99214 OFFICE O/P EST MOD 30 MIN: CPT | Performed by: PHYSICIAN ASSISTANT

## 2018-01-31 RX ORDER — AMOXICILLIN 500 MG/1
500 CAPSULE ORAL 3 TIMES DAILY
Qty: 30 CAPSULE | Refills: 0 | Status: SHIPPED | OUTPATIENT
Start: 2018-01-31 | End: 2018-02-14

## 2018-01-31 NOTE — NURSING NOTE
"Chief Complaint   Patient presents with     RECHECK       Initial BP (!) 150/103  Pulse 108  Temp 100.2  F (37.9  C) (Tympanic)  Ht 5' 3.75\" (1.619 m)  Wt 189 lb 6.4 oz (85.9 kg)  LMP 01/13/2018 (Approximate)  Breastfeeding? No  BMI 32.77 kg/m2 Estimated body mass index is 32.77 kg/(m^2) as calculated from the following:    Height as of this encounter: 5' 3.75\" (1.619 m).    Weight as of this encounter: 189 lb 6.4 oz (85.9 kg).  Medication Reconciliation: complete     Tracy Scott MA      "

## 2018-01-31 NOTE — PROGRESS NOTES
SUBJECTIVE:   Rosemarie Michel is a 47 year old female who presents to clinic today for the following health issues:      Pt c/o of Jaw ear and neck pain on right side . Not constant. She states she  had sinus infection and it began there.     Sinus infection resolved for at least 3-4 days last week and then she developed pain in her right neck 3 days ago.  No pain while eating.  No sore throat.     Has h/o high blood pressure.    Was checking BP at home  Dad with h/o HTN and CAD        Problem list and histories reviewed & adjusted, as indicated.  Additional history: as documented    Patient Active Problem List   Diagnosis     Hyperlipidemia LDL goal <160     Excessive bleeding in premenopausal period     BPPV (benign paroxysmal positional vertigo)     Hypertension goal BP (blood pressure) < 140/90     History reviewed. No pertinent surgical history.    Social History   Substance Use Topics     Smoking status: Current Every Day Smoker     Years: 21.00     Types: Cigarettes     Smokeless tobacco: Never Used      Comment: 6-7 cigs per day     Alcohol use 0.0 oz/week     0 Standard drinks or equivalent per week      Comment:  5 Beers Through Whole Pregnancy     Family History   Problem Relation Age of Onset     DIABETES Mother 57     type 2     Hyperlipidemia Mother      DIABETES Father 60     type 2     CANCER Father 62     bladder     Hyperlipidemia Father      DIABETES Brother 42     type 2     Hyperlipidemia Paternal Grandmother          Current Outpatient Prescriptions   Medication Sig Dispense Refill     amoxicillin (AMOXIL) 500 MG capsule Take 1 capsule (500 mg) by mouth 3 times daily 30 capsule 0     fluticasone (FLONASE) 50 MCG/ACT spray Spray 1-2 sprays into both nostrils daily 1 Bottle 1     omeprazole (PRILOSEC) 40 MG capsule Take 1 capsule (40 mg) by mouth daily Take 30-60 minutes before a meal. 90 capsule 1     BP Readings from Last 3 Encounters:   01/31/18 (!) 150/103   01/19/18 132/89   10/26/17  "132/82    Wt Readings from Last 3 Encounters:   01/31/18 189 lb 6.4 oz (85.9 kg)   01/19/18 187 lb (84.8 kg)   10/26/17 190 lb 11.2 oz (86.5 kg)                    Reviewed and updated as needed this visit by clinical staff       Reviewed and updated as needed this visit by Provider         ROS:  Constitutional, HEENT, cardiovascular, pulmonary, gi and gu systems are negative, except as otherwise noted.    OBJECTIVE:     BP (!) 150/103  Pulse 108  Temp 100.2  F (37.9  C) (Tympanic)  Ht 5' 3.75\" (1.619 m)  Wt 189 lb 6.4 oz (85.9 kg)  LMP 01/13/2018 (Approximate)  Breastfeeding? No  BMI 32.77 kg/m2  Body mass index is 32.77 kg/(m^2).  GENERAL: healthy, alert and no distress  EYES: Eyes grossly normal to inspection, PERRL and conjunctivae and sclerae normal  HENT: ear canals and TM's normal, nose and mouth without ulcers or lesions, Tenderness and mild swelling noted over right parotid gland, no purulent drainage noted with duct massage, there was a thin watery drainage.   NECK: no adenopathy, no asymmetry, masses, or scars and thyroid normal to palpation  RESP: lungs clear to auscultation - no rales, rhonchi or wheezes  CV: regular rate and rhythm, normal S1 S2, no S3 or S4, no murmur, click or rub, no peripheral edema and peripheral pulses strong  MS: no gross musculoskeletal defects noted, no edema    Diagnostic Test Results:  none     ASSESSMENT/PLAN:         1. Sialadenitis  Sialadenitis.  Likely viral but will cover for bacterial cause (no abscess noted).  I briefly discussed the pathophysiology of this condition and likely progression of this disease.  For now will treat conservatively with antibiotics, applying moist heat to the area, and massage the duct.  Patient was instructed to f/u if symptoms worsen or fail to improve as anticipated.  - amoxicillin (AMOXIL) 500 MG capsule; Take 1 capsule (500 mg) by mouth 3 times daily  Dispense: 30 capsule; Refill: 0    2. Hypertension goal BP (blood pressure) < " 140/90  Discussed sodium restriction, maintaining ideal body weight and regular exercise program as physiologic means to achieve blood pressure control.  I encouraged patient to monitor home blood pressures with a monitor (qd now and then qweekly once controlled), log given in clinic today. Patient asked to bring this log in at next visits.  The pt will strive towards this.     Medications reviewed today, including pathophysiology, benefits and side effects.  See epic for orders.     Discussed long term complications of untreated htn    Follow-up in 4 weeks for a BP recheck and physical (possible [facility] patient)       Neyda Rao PA-C  [facility]

## 2018-01-31 NOTE — MR AVS SNAPSHOT
After Visit Summary   1/31/2018    Rosemarie Michel    MRN: [id number]           Patient Information     Date Of Birth          1970        Visit Information        Provider Department      1/31/2018 3:20 PM Neyda Rao PA-C [facility]        Today's Diagnoses     Hypertension goal BP (blood pressure) < 140/90    -  1    Sialadenitis          Care Instructions    Apply moist heat and massage duct to clear this infection (along with the antibiotic).     [facility] Hypertension Study Summary     Thank you for your interest in the [facility] hypertension research study. This study is designed to test whether genetically guided blood pressure medication management is better than the standard of care.  Both groups will use medications that have been used for many years to treat high blood pressure ( diuretics, beta blockers, calcium channel blockers, AceI /ARB).  The ORDER of medications chosen may be different however. All participants will receive the genetic testing for free along with free research related visits.  Participants will not be given the results of their genetic test results until the END of the study.Participants will also receive compensation totaling about $100 for completing all study visits and surveys.      If you would like to enroll or know more about using your genetics to determine which hypertensive medications may work best for you please contact the research coordinator as [phone] or email [email or web address]    Who may qualify for the study:  1) New diagnosis of high blood pressure but not yet on blood pressure medication.  2) Existing diagnosis of hypertension but blood pressure is  uncontrolled with 1 or less class of medications.   3) Age between 30 and 70  4) BMI between 19-50    Who should NOT be in the study:    1) All patient taking more than 1 class of hypertensive medication are excluded.   2) Documented instance of following conditions     A. Cardiac Disease  i. ICD-10 Code for Coronary Artery Disease - CAD: 410.* -414.*, I25.*  ii. ICD-10 Code for Heart Failure - 428.*, I50.*  iii. ICD-10 Code for Congenital Cardiac Disease - 745.*, -747.*, Q20.*, -Q28.*   B. Kidney Disease        i. ICD-10 Code for Chronic Kidney Disease - CKD:ICD9 585.*and ICD10 N18.*   C. Vascular Disease        i. ICD-10 Code for Peripheral Vascular Disease - 443.9, I73.9        ii. ICD-10 Code for Pulmonary Hypertension - 416.0, 416.8, I27.0, I27.2   D. Secondary Hypertension                     i. ICD-10 Code for Hypertension Secondary to Other Diseases - I15.0-2, I15.8-9  3) Any patient who has chronic medical conditions that  their doctor/provider feels would make them unsafe to participate in a research study where initial choice of blood pressure  medication could be from 1 of these 4 BP classes of medicines: diuretics, beta blockers, calcium channel blockers, AceI /ARB.    Study visit occur at the following clinic locations  North:  1) [place]  2) [place]  3) [place]  4) [place]  5) [place]  6) [place]  7) [place]  8) [place]    [place]:  1) [facility]  2) [place]  3) [facility]  1) [place]    Patient Expectations:    1) Take Hypertension medications  2) Attend scheduled study visits  3) Complete online surveys sent between visits     If enrolled patient is asked to attend 5 to 8 study visits over the next 12 months.      Taking Your Blood Pressure  Blood pressure is the force of blood against the artery wall as it moves from the heart through the blood vessels. You can take your own blood pressure reading using a digital monitor. Take your readings the same each time, using the same arm. Take readings as often as your healthcare provider instructs.  About blood pressure monitors  Blood pressure monitors are designed for certain ages and cases. You can find monitors for older adults, for pregnant women, and for children. Make  sure the one you choose is the right one for your age and situation.  The American Heart Association recommends an automatic cuff monitor that fits on your upper arm (bicep). The cuff should fit your arm size. A cuff that s too large or too small will not give an accurate reading. Measure around your upper arm to find your size.  Monitors that attach to your finger or wrist are not as accurate as monitors for your upper arm.  Ask your healthcare provider for help in choosing a monitor. Bring your monitor to your next provider visit if you need help in using it the correct way.  The steps below are general instructions for using an automatic digital monitor.  Step 1. Relax      Take your blood pressure at the same time every day, such as in the morning or evening, or at the time your healthcare provider recommends.    Wait at least a half-hour after smoking, eating, or exercising. Don't drink coffee, tea, soda, or other caffeinated beverages before checking your blood pressure.    Sit comfortably at a table with both feet on the floor. Do not cross your legs or feet. Place the monitor near you.    Rest for a few minutes before you begin.  Step 2. Wrap the cuff      Place your arm on the table, palm up. Your arm should be at the level of your heart. Wrap the cuff around your upper arm, just above your elbow. It s best done on bare skin, not over clothing. Most cuffs will indicate where the brachial artery (the blood vessel in the middle of the arm at the inner side of the elbow) should line up with the cuff. Look in your monitor's instruction booklet for an illustration. You can also bring your cuff to your healthcare provider and have them show you how to correctly place the cuff.  Step 3. Inflate the cuff      Push the button that starts the pump.    The cuff will tighten, then loosen.    The numbers will change. When they stop changing, your blood pressure reading will appear.    Take 2 or 3 readings one minute  apart.  Step 4. Write down the results of each reading      Write down your blood pressure numbers for each reading. Note the date and time. Keep your results in one place, such as a notebook. Even if your monitor has a built-in memory, keep a hard copy of the readings.    Remove the cuff from your arm. Turn off the machine.    Bring your blood pressure records with your healthcare providers at each visit.    If you start a new blood pressure medicine, note the day you started the new medicine. Also note the day if you change the dose of your medicine. This information goes on your blood pressure recording sheet. This will help your healthcare provider monitor how well the medicine changes are working.    Ask your healthcare provider what numbers should prompt you to call him or her. Also ask what numbers should prompt you to get help right away.  Date Last Reviewed: 11/1/2016 2000-2017 [employer]. [address] [place] [address]. All rights reserved. This information is not intended as a substitute for professional medical care. Always follow your healthcare professional's instructions.        High Blood Pressure, New, Begin Treatment  Your blood pressure was high enough today to start treatment with medicines. Often health care providers don t know what causes high blood pressure (hypertension). But it can be controlled with lifestyle changes and medicines. High blood pressure usually has no symptoms. But it can sometimes cause headache, dizziness, blurred vision, a rushing sound in your ears, chest pain, or shortness of breath. But even without symptoms, high blood pressure that s not treated raises your risk for heart attack and stroke. High blood pressure is a serious health risk and shouldn t be ignored.    A blood pressure reading is made up of two numbers: a higher number over a lower number. The top number is the systolic pressure. The bottom number is the diastolic pressure. A  normal blood pressure is a systolic pressure of less than 120 over a diastolic pressure less than 80. You will see your blood pressure readings written together. For example, a person with a systolic pressure of 118 and a diastolic pressure of 78 will have 118/78 written in the medical record.  High blood pressure is when either the top number is 140 or higher, or the bottom number is 90 or higher. High blood pressure is diagnosed when multiple, separate readings show blood pressures above 140/90. The blood pressures between normal and high are called prehypertension.  Home care  If you have high blood pressure, you should do what is listed below to lower your blood pressure. If you are taking medicines for high blood pressure, these methods may reduce or end your need for medicines in the future.    Begin a weight-loss program if you are overweight.    Cut back on how much salt you get in your diet. Here s how to do this:    Don t eat foods that have a lot of salt. These include olives, pickles, smoked meats, and salted potato chips.    Don t add salt to your food at the table.    Use only small amounts of salt when cooking.    Review food labels to track how much salt is in prepared foods.    When eating out, ask that no additional salt be added to your food order.    Begin an exercise program. Talk with your health care provider about the type of exercise program that would be best for you. It doesn't have to be hard. Even brisk walking for 20 minutes 3 times a week is a good form of exercise.    Don t take medicines that have heart stimulants. This includes many over-the-counter cold and sinus decongestant pills and sprays, as well as diet pills. Check the warnings about hypertension on the label. Before purchasing any over-the-counter medicines or supplements, always ask the pharmacist about the product's potential interaction with your high blood pressure and your high blood pressure medicines.    Stimulants  such as amphetamine or cocaine could be lethal for someone with high blood pressure. Never take these.    Limit how much caffeine you get in your diet. Switch to caffeine-free products.    Stop smoking. If you are a long-time smoker, this can be hard. Enroll in a stop-smoking program to make it more likely that you will quit for good.    Learn how to handle stress. This is an important part of any program to lower blood pressure. Learn about relaxation methods like meditation, yoga, or biofeedback.    If your provider prescribed medicines, take them exactly as directed. Missing doses may cause your blood pressure get out of control.    If you miss a dose or doses, check with your healthcare provider or pharmacist about what to do.    Consider buying an automatic blood pressure machine. Your provider can make a recommendation. You can get one of these at most pharmacies.  The American Heart Association recommends the following guidelines for home blood pressure monitoring:    Don't smoke or drink coffee for 30 minutes before taking your blood pressure.    Go to the bathroom before the test.    Relax for 5 minutes before taking the measurement.    Sit with your back supported (don't sit on a couch or soft chair); keep your feet on the floor uncrossed. Place your arm on a solid flat surface (like a table) with the upper part of the arm at heart level. Place the middle of the cuff directly above the eye of the elbow. Check the monitor's instruction manual for an illustration.    Take multiple readings. When you measure, take 2 to 3 readings one minute apart and record all of the results.    Take your blood pressure at the same time every day, or as your healthcare provider recommends.    Record the date, time, and blood pressure reading.    Take the record with you to your next medical appointment. If your blood pressure monitor has a built-in memory, simply take the monitor with you to your next appointment.    Call  your provider if you have several high readings. Don't be frightened by a single high blood pressure reading, but if you get several high readings, check in with your healthcare provider.    Note: When blood pressure reaches a systolic (top number) of 180 or higher OR diastolic (bottom number) of 110 or higher, seek emergency medical treatment.  Follow-up care  Because a new blood pressure medicine was started today, it s important that you have your blood pressure rechecked. This is to make sure that the medicine is working and that you have no serious side effects. Keep all your follow up appointments. Write down medicine and blood pressure questions and bring them to your next appointment. If you have pressing concerns about your new medicine or your blood pressure, call your provider. Unless told otherwise, follow up with your health care provider or this facility within the next 3 days.  When to seek medical advice  Call your healthcare provider right away if any of these occur:    Blood pressure reaches a systolic (top number) of 180 or higher, OR diastolic (bottom number) of 110 or higher, seek emergency medical treatment.    Chest pain or shortness of breath    Severe headache    Throbbing or rushing sound in the ears    Nosebleed    Sudden severe pain in your belly (abdomen)    Extreme drowsiness, confusion, or fainting    Dizziness or dizziness with a spinning sensation (vertigo)    Weakness of an arm or leg or one side of the face    You have problems speaking or seeing   Date Last Reviewed: 12/1/2016 2000-2017 [employer]. [address] [place] [address]. All rights reserved. This information is not intended as a substitute for professional medical care. Always follow your healthcare professional's instructions.                Follow-ups after your visit        Your next 10 appointments already scheduled     Feb 14, 2018 11:10 AM CST   PHYSICAL with Yolande Jackson MD  "  [facility] [place] ([facility] [place])    [address]  [address]  [place] [address]   [phone]            Feb 14, 2018 11:30 AM CST   (Arrive by 11:15 AM)   MA SCREENING DIGITAL BILATERAL with [email or web address]   [facility] [place] ([facility] [place])    [address], [address]  [place] [address]   [phone]           Do not use any powder, lotion or deodorant under your arms or on your breast. If you do, we will ask you to remove it before your exam.  Wear comfortable, two-piece clothing.  If you have any allergies, tell your care team.  Bring any previous mammograms from other facilities or have them mailed to the breast center. Three-dimensional (3D) mammograms are available at [place] locations in [facility], [place], [place], [facility], [facility], and [place]. [facility] locations include [place] and [facility] [place] in [place]. Benefits of 3D mammograms include: - Improved rate of cancer detection - Decreases your chance of having to go back for more tests, which means fewer: - \"False-positive\" results (This means that there is an abnormal area but it isn't cancer.) - Invasive testing procedures, such as a biopsy or surgery - Can provide clearer images of the breast if you have dense breast tissue. 3D mammography is an optional exam that anyone can have with a 2D mammogram. It doesn't replace or take the place of a 2D mammogram. 2D mammograms remain an effective screening test for all women.  Not all insurance companies cover the cost of a 3D mammogram. Check with your insurance.              Who to contact     Normal or non-critical lab and imaging results will be communicated to you by MyChart, letter or phone within 4 business days after the clinic has received the results. If you do not hear from us within 7 days, please contact the clinic through MyChart or phone. If you have a critical or " "abnormal lab result, we will notify you by phone as soon as possible.  Submit refill requests through [employer] or call your pharmacy and they will forward the refill request to us. Please allow 3 business days for your refill to be completed.          If you need to speak with a  for additional information , please call: [phone]           Additional Information About Your Visit        [employer] Information     [employer] gives you secure access to your electronic health record. If you see a primary care provider, you can also send messages to your care team and make appointments. If you have questions, please call your primary care clinic.  If you do not have a primary care provider, please call [phone] and they will assist you.        Care EveryWhere ID     This is your Care EveryWhere ID. This could be used by other organizations to access your [place] medical records  [id number]        Your Vitals Were     Pulse Temperature Height Last Period Breastfeeding? BMI (Body Mass Index)    108 100.2  F (37.9  C) (Tympanic) 5' 3.75\" (1.619 m) 01/13/2018 (Approximate) No 32.77 kg/m2       Blood Pressure from Last 3 Encounters:   01/31/18 (!) 150/103   01/19/18 132/89   10/26/17 132/82    Weight from Last 3 Encounters:   01/31/18 189 lb 6.4 oz (85.9 kg)   01/19/18 187 lb (84.8 kg)   10/26/17 190 lb 11.2 oz (86.5 kg)              Today, you had the following     No orders found for display         Today's Medication Changes          These changes are accurate as of 1/31/18  3:50 PM.  If you have any questions, ask your nurse or doctor.               Start taking these medicines.        Dose/Directions    amoxicillin 500 MG capsule   Commonly known as:  AMOXIL   Used for:  Sialadenitis   Started by:  Neyda Rao PA-C        Dose:  500 mg   Take 1 capsule (500 mg) by mouth 3 times daily   Quantity:  30 capsule   Refills:  0            Where to get your medicines      These medications were sent " to [id number] - [facility] [address] AT [address] , [place] [facility] [address]     Phone:  [phone]     amoxicillin 500 MG capsule                Primary Care Provider Office Phone # Fax #    Citlali Aury Newsome PA-C [phone] [address] [place] [address]        Equal Access to Services     OSCAR SCOTT : Hadii aad ku hadasho Soomaali, waaxda luqadaha, qaybta kaalmada adeegyada, waxay idiin hayaan adeeg kharash la'shahid . So [facility] [phone].    ATENCIÓN: Si habla español, tiene a holliday disposición servicios gratuitos de asistencia lingüística. [facility] [phone].    We comply with applicable federal civil rights laws and [place] laws. We do not discriminate on the basis of race, color, national origin, age, disability, sex, sexual orientation, or gender identity.            Thank you!     Thank you for choosing [facility]  for your care. Our goal is always to provide you with excellent care. Hearing back from our patients is one way we can continue to improve our services. Please take a few minutes to complete the written survey that you may receive in the mail after your visit with us. Thank you!             Your Updated Medication List - Protect others around you: Learn how to safely use, store and throw away your medicines at www.disposemymeds.org.          This list is accurate as of 1/31/18  3:50 PM.  Always use your most recent med list.                   Brand Name Dispense Instructions for use Diagnosis    amoxicillin 500 MG capsule    AMOXIL    30 capsule    Take 1 capsule (500 mg) by mouth 3 times daily    Sialadenitis       fluticasone 50 MCG/ACT spray    FLONASE    1 Bottle    Spray 1-2 sprays into both nostrils daily    Benign paroxysmal positional vertigo of right ear       omeprazole 40 MG capsule    priLOSEC    90 capsule    Take 1 capsule (40 mg) by mouth daily Take 30-60 minutes  before a meal.    Acute gastric ulcer, unspecified whether gastric ulcer hemorrhage or perforation present

## 2018-01-31 NOTE — PATIENT INSTRUCTIONS
Apply moist heat and massage duct to clear this infection (along with the antibiotic).     [facility] Hypertension Study Summary     Thank you for your interest in the [facility] hypertension research study. This study is designed to test whether genetically guided blood pressure medication management is better than the standard of care.  Both groups will use medications that have been used for many years to treat high blood pressure ( diuretics, beta blockers, calcium channel blockers, AceI /ARB).  The ORDER of medications chosen may be different however. All participants will receive the genetic testing for free along with free research related visits.  Participants will not be given the results of their genetic test results until the END of the study.Participants will also receive compensation totaling about $100 for completing all study visits and surveys.      If you would like to enroll or know more about using your genetics to determine which hypertensive medications may work best for you please contact the research coordinator as [phone] or email [email or web address]    Who may qualify for the study:  1) New diagnosis of high blood pressure but not yet on blood pressure medication.  2) Existing diagnosis of hypertension but blood pressure is  uncontrolled with 1 or less class of medications.   3) Age between 30 and 70  4) BMI between 19-50    Who should NOT be in the study:    1) All patient taking more than 1 class of hypertensive medication are excluded.   2) Documented instance of following conditions    A. Cardiac Disease  i. ICD-10 Code for Coronary Artery Disease - CAD: 410.* -414.*, I25.*  ii. ICD-10 Code for Heart Failure - 428.*, I50.*  iii. ICD-10 Code for Congenital Cardiac Disease - 745.*, -747.*, Q20.*, -Q28.*   B. Kidney Disease        i. ICD-10 Code for Chronic Kidney Disease - CKD:ICD9 585.*and ICD10 N18.*   C. Vascular Disease        i. ICD-10 Code for Peripheral Vascular Disease - 443.9,  I73.9        ii. ICD-10 Code for Pulmonary Hypertension - 416.0, 416.8, I27.0, I27.2   D. Secondary Hypertension                     i. ICD-10 Code for Hypertension Secondary to Other Diseases - I15.0-2, I15.8-9  3) Any patient who has chronic medical conditions that  their doctor/provider feels would make them unsafe to participate in a research study where initial choice of blood pressure  medication could be from 1 of these 4 BP classes of medicines: diuretics, beta blockers, calcium channel blockers, AceI /ARB.    Study visit occur at the following clinic locations  [place]:  1) [place]  2) [place]  3) [place]  4) [place]  5) [place]  6) [place]  7) [place]  8) [place]    South:  1) [facility])  2) [place]  3) [facility]:  1) [place]    Patient Expectations:    1) Take Hypertension medications  2) Attend scheduled study visits  3) Complete online surveys sent between visits     If enrolled patient is asked to attend 5 to 8 study visits over the next 12 months.      Taking Your Blood Pressure  Blood pressure is the force of blood against the artery wall as it moves from the heart through the blood vessels. You can take your own blood pressure reading using a digital monitor. Take your readings the same each time, using the same arm. Take readings as often as your healthcare provider instructs.  About blood pressure monitors  Blood pressure monitors are designed for certain ages and cases. You can find monitors for older adults, for pregnant women, and for children. Make sure the one you choose is the right one for your age and situation.  The American Heart Association recommends an automatic cuff monitor that fits on your upper arm (bicep). The cuff should fit your arm size. A cuff that s too large or too small will not give an accurate reading. Measure around your upper arm to find your size.  Monitors that attach to your finger or wrist are not as accurate as monitors  for your upper arm.  Ask your healthcare provider for help in choosing a monitor. Bring your monitor to your next provider visit if you need help in using it the correct way.  The steps below are general instructions for using an automatic digital monitor.  Step 1. Relax      Take your blood pressure at the same time every day, such as in the morning or evening, or at the time your healthcare provider recommends.    Wait at least a half-hour after smoking, eating, or exercising. Don't drink coffee, tea, soda, or other caffeinated beverages before checking your blood pressure.    Sit comfortably at a table with both feet on the floor. Do not cross your legs or feet. Place the monitor near you.    Rest for a few minutes before you begin.  Step 2. Wrap the cuff      Place your arm on the table, palm up. Your arm should be at the level of your heart. Wrap the cuff around your upper arm, just above your elbow. It s best done on bare skin, not over clothing. Most cuffs will indicate where the brachial artery (the blood vessel in the middle of the arm at the inner side of the elbow) should line up with the cuff. Look in your monitor's instruction booklet for an illustration. You can also bring your cuff to your healthcare provider and have them show you how to correctly place the cuff.  Step 3. Inflate the cuff      Push the button that starts the pump.    The cuff will tighten, then loosen.    The numbers will change. When they stop changing, your blood pressure reading will appear.    Take 2 or 3 readings one minute apart.  Step 4. Write down the results of each reading      Write down your blood pressure numbers for each reading. Note the date and time. Keep your results in one place, such as a notebook. Even if your monitor has a built-in memory, keep a hard copy of the readings.    Remove the cuff from your arm. Turn off the machine.    Bring your blood pressure records with your healthcare providers at each  visit.    If you start a new blood pressure medicine, note the day you started the new medicine. Also note the day if you change the dose of your medicine. This information goes on your blood pressure recording sheet. This will help your healthcare provider monitor how well the medicine changes are working.    Ask your healthcare provider what numbers should prompt you to call him or her. Also ask what numbers should prompt you to get help right away.  Date Last Reviewed: 11/1/2016 2000-2017 [employer]. [address] [place], [place] [id number]. All rights reserved. This information is not intended as a substitute for professional medical care. Always follow your healthcare professional's instructions.        High Blood Pressure, New, Begin Treatment  Your blood pressure was high enough today to start treatment with medicines. Often health care providers don t know what causes high blood pressure (hypertension). But it can be controlled with lifestyle changes and medicines. High blood pressure usually has no symptoms. But it can sometimes cause headache, dizziness, blurred vision, a rushing sound in your ears, chest pain, or shortness of breath. But even without symptoms, high blood pressure that s not treated raises your risk for heart attack and stroke. High blood pressure is a serious health risk and shouldn t be ignored.    A blood pressure reading is made up of two numbers: a higher number over a lower number. The top number is the systolic pressure. The bottom number is the diastolic pressure. A normal blood pressure is a systolic pressure of less than 120 over a diastolic pressure less than 80. You will see your blood pressure readings written together. For example, a person with a systolic pressure of 118 and a diastolic pressure of 78 will have 118/78 written in the medical record.  High blood pressure is when either the top number is 140 or higher, or the bottom number is 90 or higher.  High blood pressure is diagnosed when multiple, separate readings show blood pressures above 140/90. The blood pressures between normal and high are called prehypertension.  Home care  If you have high blood pressure, you should do what is listed below to lower your blood pressure. If you are taking medicines for high blood pressure, these methods may reduce or end your need for medicines in the future.    Begin a weight-loss program if you are overweight.    Cut back on how much salt you get in your diet. Here s how to do this:    Don t eat foods that have a lot of salt. These include olives, pickles, smoked meats, and salted potato chips.    Don t add salt to your food at the table.    Use only small amounts of salt when cooking.    Review food labels to track how much salt is in prepared foods.    When eating out, ask that no additional salt be added to your food order.    Begin an exercise program. Talk with your health care provider about the type of exercise program that would be best for you. It doesn't have to be hard. Even brisk walking for 20 minutes 3 times a week is a good form of exercise.    Don t take medicines that have heart stimulants. This includes many over-the-counter cold and sinus decongestant pills and sprays, as well as diet pills. Check the warnings about hypertension on the label. Before purchasing any over-the-counter medicines or supplements, always ask the pharmacist about the product's potential interaction with your high blood pressure and your high blood pressure medicines.    Stimulants such as amphetamine or cocaine could be lethal for someone with high blood pressure. Never take these.    Limit how much caffeine you get in your diet. Switch to caffeine-free products.    Stop smoking. If you are a long-time smoker, this can be hard. Enroll in a stop-smoking program to make it more likely that you will quit for good.    Learn how to handle stress. This is an important part of any  program to lower blood pressure. Learn about relaxation methods like meditation, yoga, or biofeedback.    If your provider prescribed medicines, take them exactly as directed. Missing doses may cause your blood pressure get out of control.    If you miss a dose or doses, check with your healthcare provider or pharmacist about what to do.    Consider buying an automatic blood pressure machine. Your provider can make a recommendation. You can get one of these at most pharmacies.  The American Heart Association recommends the following guidelines for home blood pressure monitoring:    Don't smoke or drink coffee for 30 minutes before taking your blood pressure.    Go to the bathroom before the test.    Relax for 5 minutes before taking the measurement.    Sit with your back supported (don't sit on a couch or soft chair); keep your feet on the floor uncrossed. Place your arm on a solid flat surface (like a table) with the upper part of the arm at heart level. Place the middle of the cuff directly above the eye of the elbow. Check the monitor's instruction manual for an illustration.    Take multiple readings. When you measure, take 2 to 3 readings one minute apart and record all of the results.    Take your blood pressure at the same time every day, or as your healthcare provider recommends.    Record the date, time, and blood pressure reading.    Take the record with you to your next medical appointment. If your blood pressure monitor has a built-in memory, simply take the monitor with you to your next appointment.    Call your provider if you have several high readings. Don't be frightened by a single high blood pressure reading, but if you get several high readings, check in with your healthcare provider.    Note: When blood pressure reaches a systolic (top number) of 180 or higher OR diastolic (bottom number) of 110 or higher, seek emergency medical treatment.  Follow-up care  Because a new blood pressure medicine  was started today, it s important that you have your blood pressure rechecked. This is to make sure that the medicine is working and that you have no serious side effects. Keep all your follow up appointments. Write down medicine and blood pressure questions and bring them to your next appointment. If you have pressing concerns about your new medicine or your blood pressure, call your provider. Unless told otherwise, follow up with your health care provider or this facility within the next 3 days.  When to seek medical advice  Call your healthcare provider right away if any of these occur:    Blood pressure reaches a systolic (top number) of 180 or higher, OR diastolic (bottom number) of 110 or higher, seek emergency medical treatment.    Chest pain or shortness of breath    Severe headache    Throbbing or rushing sound in the ears    Nosebleed    Sudden severe pain in your belly (abdomen)    Extreme drowsiness, confusion, or fainting    Dizziness or dizziness with a spinning sensation (vertigo)    Weakness of an arm or leg or one side of the face    You have problems speaking or seeing   Date Last Reviewed: 12/1/2016 2000-2017 [employer]. [address] [place] [address], [place] [address]. All rights reserved. This information is not intended as a substitute for professional medical care. Always follow your healthcare professional's instructions.

## 2018-02-02 PROBLEM — I10 HYPERTENSION GOAL BP (BLOOD PRESSURE) < 140/90: Status: ACTIVE | Noted: 2018-02-02

## 2018-02-14 ENCOUNTER — OFFICE VISIT (OUTPATIENT)
Dept: OBGYN | Facility: CLINIC | Age: 48
End: 2018-02-14
Payer: COMMERCIAL

## 2018-02-14 ENCOUNTER — RADIANT APPOINTMENT (OUTPATIENT)
Dept: MAMMOGRAPHY | Facility: CLINIC | Age: 48
End: 2018-02-14
Payer: COMMERCIAL

## 2018-02-14 VITALS
DIASTOLIC BLOOD PRESSURE: 88 MMHG | SYSTOLIC BLOOD PRESSURE: 138 MMHG | HEIGHT: 64 IN | WEIGHT: 187 LBS | HEART RATE: 100 BPM | BODY MASS INDEX: 31.92 KG/M2

## 2018-02-14 DIAGNOSIS — Z12.31 VISIT FOR SCREENING MAMMOGRAM: ICD-10-CM

## 2018-02-14 DIAGNOSIS — Z01.419 ENCOUNTER FOR GYNECOLOGICAL EXAMINATION WITHOUT ABNORMAL FINDING: Primary | ICD-10-CM

## 2018-02-14 DIAGNOSIS — N94.6 DYSMENORRHEA: ICD-10-CM

## 2018-02-14 DIAGNOSIS — N92.4 EXCESSIVE BLEEDING IN PREMENOPAUSAL PERIOD: ICD-10-CM

## 2018-02-14 PROCEDURE — 99396 PREV VISIT EST AGE 40-64: CPT | Performed by: OBSTETRICS & GYNECOLOGY

## 2018-02-14 PROCEDURE — 99214 OFFICE O/P EST MOD 30 MIN: CPT | Mod: 25 | Performed by: OBSTETRICS & GYNECOLOGY

## 2018-02-14 PROCEDURE — 77067 SCR MAMMO BI INCL CAD: CPT | Mod: TC

## 2018-02-14 ASSESSMENT — ANXIETY QUESTIONNAIRES
3. WORRYING TOO MUCH ABOUT DIFFERENT THINGS: SEVERAL DAYS
6. BECOMING EASILY ANNOYED OR IRRITABLE: SEVERAL DAYS
IF YOU CHECKED OFF ANY PROBLEMS ON THIS QUESTIONNAIRE, HOW DIFFICULT HAVE THESE PROBLEMS MADE IT FOR YOU TO DO YOUR WORK, TAKE CARE OF THINGS AT HOME, OR GET ALONG WITH OTHER PEOPLE: NOT DIFFICULT AT ALL
GAD7 TOTAL SCORE: 4
1. FEELING NERVOUS, ANXIOUS, OR ON EDGE: NOT AT ALL
7. FEELING AFRAID AS IF SOMETHING AWFUL MIGHT HAPPEN: NOT AT ALL
2. NOT BEING ABLE TO STOP OR CONTROL WORRYING: SEVERAL DAYS
5. BEING SO RESTLESS THAT IT IS HARD TO SIT STILL: NOT AT ALL

## 2018-02-14 ASSESSMENT — PATIENT HEALTH QUESTIONNAIRE - PHQ9: 5. POOR APPETITE OR OVEREATING: SEVERAL DAYS

## 2018-02-14 NOTE — MR AVS SNAPSHOT
After Visit Summary   2/14/2018    Rosemarie Michel    MRN: [id number]           Patient Information     Date Of Birth          1970        Visit Information        Provider Department      2/14/2018 11:10 AM Yolande Jackson MD [facility]        Today's Diagnoses     Encounter for gynecological examination without abnormal finding    -  1    Excessive bleeding in premenopausal period        Dysmenorrhea           Follow-ups after your visit        Future tests that were ordered for you today     Open Future Orders        Priority Expected Expires Ordered    US Transvaginal Non OB Routine  2/14/2019 2/14/2018            Who to contact     If you have questions or need follow up information about today's clinic visit or your schedule please contact [facility] directly at [phone].  Normal or non-critical lab and imaging results will be communicated to you by MyChart, letter or phone within 4 business days after the clinic has received the results. If you do not hear from us within 7 days, please contact the clinic through [employer] or phone. If you have a critical or abnormal lab result, we will notify you by phone as soon as possible.  Submit refill requests through [employer] or call your pharmacy and they will forward the refill request to us. Please allow 3 business days for your refill to be completed.          Additional Information About Your Visit        MyChart Information     [employer] gives you secure access to your electronic health record. If you see a primary care provider, you can also send messages to your care team and make appointments. If you have questions, please call your primary care clinic.  If you do not have a primary care provider, please call [phone] and they will assist you.        Care EveryWhere ID     This is your Care EveryWhere ID. This could be used by other organizations to access your [facility]  "records  [id number]        Your Vitals Were     Pulse Height Last Period Breastfeeding? BMI (Body Mass Index)       100 5' 3.75\" (1.619 m) 02/06/2018 No 32.35 kg/m2        Blood Pressure from Last 3 Encounters:   02/14/18 138/88   01/31/18 (!) 150/103   01/19/18 132/89    Weight from Last 3 Encounters:   02/14/18 187 lb (84.8 kg)   01/31/18 189 lb 6.4 oz (85.9 kg)   01/19/18 187 lb (84.8 kg)               Primary Care Provider Office Phone # Fax #    Citlali Aury Newsome PA-C [phone] [address] [place] [address]        Equal Access to Services     KAYLA SCOTT : Hadii domenica cherry hadasho Soomaali, waaxda luqadaha, qaybta kaalmada adeegyada, waxschuyler nava hayshahid sosa . So [facility] [phone].    ATENCIÓN: Si habla español, tiene a holliday disposición servicios gratuitos de asistencia lingüística. Llame al [phone].    We comply with applicable federal civil rights laws and [place] laws. We do not discriminate on the basis of race, color, national origin, age, disability, sex, sexual orientation, or gender identity.            Thank you!     Thank you for choosing [facility]  for your care. Our goal is always to provide you with excellent care. Hearing back from our patients is one way we can continue to improve our services. Please take a few minutes to complete the written survey that you may receive in the mail after your visit with us. Thank you!             Your Updated Medication List - Protect others around you: Learn how to safely use, store and throw away your medicines at www.disposemymeds.org.          This list is accurate as of 2/14/18 11:57 AM.  Always use your most recent med list.                   Brand Name Dispense Instructions for use Diagnosis    fluticasone 50 MCG/ACT spray    FLONASE    1 Bottle    Spray 1-2 sprays into both nostrils daily    Benign paroxysmal positional vertigo of right ear         "

## 2018-02-14 NOTE — PROGRESS NOTES
Rosemarie is a 47 year old  female who presents for annual exam.     Besides routine health maintenance,  she would like to discuss possible hysterectomy.    HPI:  The patient's PCP is  Citlali Newsome PA-C.      Patient had her option endometrial ablation in .  Periods have not improved  She still has to use double protection, has clots and a lot of dysmenorrhea  Also has a lot of pms around her period  Interested in hysterectomy  Pap and hpv normal last years  Works for [employer]  Smoker, declines quitting  Had a prior c section , no surgical complications with it      GYNECOLOGIC HISTORY:    Patient's last menstrual period was 2018.  Her current contraception method is: condoms.  She  reports that she has been smoking Cigarettes.  She has smoked for the past 21.00 years. She has never used smokeless tobacco.  Tobacco Cessation Action Plan: Information offered: Patient not interested at this time  Patient is sexually active.  STD testing offered?  Declined  Last PHQ-9 score on record =   PHQ-9 SCORE 2018   Total Score 0     Last GAD7 score on record =   DANII-7 SCORE 2018   Total Score 4     Alcohol Score = 2    HEALTH MAINTENANCE:  Cholesterol: 2015  Total= 219, Triglycerides=92, HDL=64, NOB=930  Cholesterol   Date Value Ref Range Status   2015 219 (A) 115 - 199 mg/dL Final      Last Mammo: 2016, Result: normal, Next Mammo: today @[facility]  Pap: 2017 Neg, HPV Neg  Lab Results   Component Value Date    PAP NIL 2017      Colonoscopy:Never had done , Result: not applicable, Next Colonoscopy: Due at age 52.  Dexa:  NA    Health maintenance updated:  yes    HISTORY:  Obstetric History       T1      L1     SAB1   TAB0   Ectopic0   Multiple0   Live Births1       # Outcome Date GA Lbr Darvin/2nd Weight Sex Delivery Anes PTL Lv   2 Term 11 38w6d  7 lb 2 oz (3.232 kg) M CS-LTranv EPI N SHELLY      Name: Harvey      Apgar1:  9                Apgar5: 9   1 SAB  "                  Patient Active Problem List   Diagnosis     Hyperlipidemia LDL goal <160     Excessive bleeding in premenopausal period     BPPV (benign paroxysmal positional vertigo)     Hypertension goal BP (blood pressure) < 140/90     History reviewed. No pertinent surgical history.   Social History   Substance Use Topics     Smoking status: Current Every Day Smoker     Years: 21.00     Types: Cigarettes     Smokeless tobacco: Never Used      Comment: 6-7 cigs per day     Alcohol use 0.0 oz/week     0 Standard drinks or equivalent per week      Comment:  5 Beers Through Whole Pregnancy      Problem (# of Occurrences) Relation (Name,Age of Onset)    CANCER (1) Father (62): bladder    DIABETES (3) Mother (57): type 2, Father (60): type 2, Brother (42): type 2    Hyperlipidemia (3) Mother, Father, Paternal Grandmother            Current Outpatient Prescriptions   Medication Sig     fluticasone (FLONASE) 50 MCG/ACT spray Spray 1-2 sprays into both nostrils daily     No current facility-administered medications for this visit.      Allergies   Allergen Reactions     Latex      Shellfish Allergy        Past medical, surgical, social and family histories were reviewed and updated in EPIC.    ROS:   12 point review of systems negative other than symptoms noted below.  Genitourinary: Heavy Bleeding with Period  Endocrine: Decreased Libido    EXAM:  /88  Pulse 100  Ht 5' 3.75\" (1.619 m)  Wt 187 lb (84.8 kg)  LMP 02/06/2018  Breastfeeding? No  BMI 32.35 kg/m2   BMI: Body mass index is 32.35 kg/(m^2).    PHYSICAL EXAM:  Constitutional:  Appearance: Well nourished, well developed, alert, in no acute distress  Neck:  Lymph Nodes:  No lymphadenopathy present    Thyroid:  Gland size normal, nontender, no nodules or masses present  on palpation  Chest:  Respiratory Effort:  Breathing unlabored  Cardiovascular:    Heart: Auscultation:  Regular rate, normal rhythm, no murmurs present  Breasts: Inspection of Breasts:  " No lymphadenopathy present., Palpation of Breasts and Axillae:  No masses present on palpation, no breast tenderness., Axillary Lymph Nodes:  No lymphadenopathy present. and No nodularity, asymmetry or nipple discharge bilaterally.  Gastrointestinal:   Abdominal Examination:  Abdomen nontender to palpation, tone normal without rigidity or guarding, no masses present, umbilicus without lesions   Liver and Spleen:  No hepatomegaly present, liver nontender to palpation    Hernias:  No hernias present  Lymphatic: Lymph Nodes:  No other lymphadenopathy present  Skin:  General Inspection:  No rashes present, no lesions present, no areas of  discoloration    Genitalia and Groin:  No rashes present, no lesions present, no areas of  discoloration, no masses present  Neurologic/Psychiatric:    Mental Status:  Oriented X3     Pelvic Exam:  External Genitalia:     Normal appearance for age, no discharge present, no tenderness present, no inflammatory lesions present, color normal  Vagina:     Normal vaginal vault without central or paravaginal defects, no discharge present, no inflammatory lesions present, no masses present  Bladder:     Nontender to palpation  Urethra:   Urethral Body:  Urethra palpation normal, urethra structural support normal   Urethral Meatus:  No erythema or lesions present  Cervix:     Appearance healthy, no lesions present, nontender to palpation, no bleeding present  Uterus:     Uterus: firm, normal sized and nontender, midplane in position.   Adnexa:     No adnexal tenderness present, no adnexal masses present  Perineum:     Perineum within normal limits, no evidence of trauma, no rashes or skin lesions present  Anus:     Anus within normal limits, no hemorrhoids present  Inguinal Lymph Nodes:     No lymphadenopathy present  Pubic Hair:     Normal pubic hair distribution for age  Genitalia and Groin:     No rashes present, no lesions present, no areas of discoloration, no masses  present      COUNSELING:   Reviewed preventive health counseling, as reflected in patient instructions       menorrhagia    BMI: Body mass index is 32.35 kg/(m^2).  Weight management plan: work on diet and exercise    ASSESSMENT:  47 year old female with satisfactory annual exam.    ICD-10-CM    1. Encounter for gynecological examination without abnormal finding Z01.419    2. Excessive bleeding in premenopausal period N92.4 US Transvaginal Non OB   3. Dysmenorrhea N94.6 US Transvaginal Non OB       PLAN:  rec reducing smoking    Return for pelvic ultrasound to reevaluate for cysts, fibroids prior to surgery  Up to date on pap  mammo today    Discussed her menorrhagia  Would like to have laproscopic assisted supracervical hysterectomy  Discussed powered morcellation in bag using pneumoliner  rec bilateral salpingectomy  Plan to preserve ovaries unless a lot of endometriosis found  Discussed risk of leaking, upstaging cancer with use of bag if it failed  Discussed probably needs a month off work for recovery  Plan cysto during case  Denies significant JAELYN  May want surgery in May  Discussed benefit of bilateral salpingectomy in possibly reducing cancer in future    Yolande Jackson MD

## 2018-02-15 ASSESSMENT — ANXIETY QUESTIONNAIRES: GAD7 TOTAL SCORE: 4

## 2018-02-15 ASSESSMENT — PATIENT HEALTH QUESTIONNAIRE - PHQ9: SUM OF ALL RESPONSES TO PHQ QUESTIONS 1-9: 0

## 2018-04-26 ENCOUNTER — OFFICE VISIT (OUTPATIENT)
Dept: FAMILY MEDICINE | Facility: CLINIC | Age: 48
End: 2018-04-26
Payer: COMMERCIAL

## 2018-04-26 VITALS
OXYGEN SATURATION: 96 % | SYSTOLIC BLOOD PRESSURE: 151 MMHG | BODY MASS INDEX: 32.39 KG/M2 | RESPIRATION RATE: 20 BRPM | HEART RATE: 111 BPM | WEIGHT: 187.2 LBS | DIASTOLIC BLOOD PRESSURE: 89 MMHG | TEMPERATURE: 98.3 F

## 2018-04-26 DIAGNOSIS — R07.0 THROAT PAIN: ICD-10-CM

## 2018-04-26 DIAGNOSIS — H66.002 ACUTE SUPPURATIVE OTITIS MEDIA OF LEFT EAR WITHOUT SPONTANEOUS RUPTURE OF TYMPANIC MEMBRANE, RECURRENCE NOT SPECIFIED: Primary | ICD-10-CM

## 2018-04-26 DIAGNOSIS — L73.9 FOLLICULITIS: ICD-10-CM

## 2018-04-26 LAB
DEPRECATED S PYO AG THROAT QL EIA: NORMAL
SPECIMEN SOURCE: NORMAL

## 2018-04-26 PROCEDURE — 87081 CULTURE SCREEN ONLY: CPT | Performed by: NURSE PRACTITIONER

## 2018-04-26 PROCEDURE — 99214 OFFICE O/P EST MOD 30 MIN: CPT | Performed by: NURSE PRACTITIONER

## 2018-04-26 PROCEDURE — 87880 STREP A ASSAY W/OPTIC: CPT | Performed by: NURSE PRACTITIONER

## 2018-04-26 RX ORDER — CHOLECALCIFEROL (VITAMIN D3) 50 MCG
1 CAPSULE ORAL DAILY
Qty: 30 TABLET | Refills: 0 | Status: SHIPPED | OUTPATIENT
Start: 2018-04-26 | End: 2018-10-15

## 2018-04-26 RX ORDER — FLUCONAZOLE 150 MG/1
150 TABLET ORAL
Qty: 4 TABLET | Refills: 0 | Status: SHIPPED | OUTPATIENT
Start: 2018-04-26 | End: 2018-10-15

## 2018-04-26 RX ORDER — CEPHALEXIN 500 MG/1
500 CAPSULE ORAL 4 TIMES DAILY
Qty: 30 CAPSULE | Refills: 0 | Status: SHIPPED | OUTPATIENT
Start: 2018-04-26 | End: 2018-10-15

## 2018-04-26 NOTE — NURSING NOTE
"Chief Complaint   Patient presents with     Ent Problem       Initial /89  Pulse 111  Temp 98.3  F (36.8  C) (Oral)  Resp 20  Wt 187 lb 3.2 oz (84.9 kg)  LMP 04/10/2018 (Approximate)  SpO2 96%  Breastfeeding? No  BMI 32.39 kg/m2 Estimated body mass index is 32.39 kg/(m^2) as calculated from the following:    Height as of 2/14/18: 5' 3.75\" (1.619 m).    Weight as of this encounter: 187 lb 3.2 oz (84.9 kg).  Medication Reconciliation: complete     Dayan Rojas MA  "

## 2018-04-26 NOTE — MR AVS SNAPSHOT
After Visit Summary   4/26/2018    Rosemarie Michel    MRN: [id number]           Patient Information     Date Of Birth          1970        Visit Information        Provider Department      4/26/2018 1:40 PM Sussy Breen NP [facility]        Today's Diagnoses     Throat pain    -  1    Acute suppurative otitis media of left ear without spontaneous rupture of tympanic membrane, recurrence not specified        Folliculitis          Care Instructions      Otitis Media (Middle-Ear Infection) in Adults  Otitis media is another name for a middle-ear infection. It means an infection behind your eardrum. This kind of ear infection can happen after any condition that keeps fluid from draining from the middle ear. These conditions include allergies, a cold, a sore throat, or a respiratory infection.  Middle-ear infections are common in children, but they can also happen in adults. An ear infection in an adult may mean a more serious problem than in a child. So you may need additional tests. If you have an ear infection, you should see your health care provider for treatment.  What are the types of middle-ear infections?  Infections can affect the middle ear in several ways. They are:    Acute otitis media. This middle-ear infection occurs suddenly. It causes swelling and redness. Fluid and mucus become trapped inside the ear. You can have a fever and ear pain.    Otitis media with effusion. Fluid (effusion) and mucus build up in the middle ear after the infection goes away. You may feel like your middle ear is full. This can continue for months and may affect your hearing.    Chronic otitis media with effusion. Fluid (effusion) remains in the middle ear for a long time. Or it builds up again and again, even though there is no infection. This type of middle-ear infection may be hard to treat. It may also affect your hearing.  Who is more likely to get a middle-ear infection?  You are more  likely to get an ear infection if you:    Smoke or are around someone who smokes    Have seasonal or year-round allergy symptoms    Have a cold or other upper respiratory infection  What causes a middle-ear infection?  The middle ear connects to the throat by a canal called the eustachian tube. This tube helps even out the pressure between the outer ear and the inner ear. A cold or allergy can irritate the tube or cause the area around it to swell. This can keep fluid from draining from the middle ear. The fluid builds up behind the eardrum. Bacteria and viruses can grow in this fluid. The bacteria and viruses cause the middle-ear infection.  What are the symptoms of a middle-ear infection?  Common symptoms of a middle-ear infection in adults are:    Pain in 1 or both ears    Drainage from the ear    Muffled hearing    Sore throat   You may also have a fever. Rarely, your balance can be affected.  These symptoms may be the same as for other conditions. It s important to talk with your health care provider if you think you have a middle-ear infection. If you have a high fever, severe pain behind your ear, or paralysis in your face, see your provider as soon as you can.  How is a middle-ear infection diagnosed?  Your health care provider will take a medical history and do a physical exam. He or she will look at the outer ear and eardrum with an otoscope. The otoscope is a lighted tool that lets your provider see inside the ear. A pneumatic otoscope blows a puff of air into the ear to check how well your eardrum moves. If you eardrum doesn t move well, it may mean you have fluid behind it.  Your provider may also do a test called tympanometry. This test tells how well the middle ear is working. It can find any changes in pressure in the middle ear. Your provider may test your hearing with a tuning fork.  How is a middle-ear infection treated?  A middle-ear infection may be treated with:    Antibiotics, taken by mouth  or as ear drops    Medication for pain    Decongestants, antihistamines, or nasal steroids  Your health care provider may also have you try autoinsufflation. This helps adjust the air pressure in your ear. For this, you pinch your nose and gently exhale. This forces air back through the eustachian tube.  The exact treatment for your ear infection will depend on the type of infection you have. In general, if your symptoms don t get better in 48 to 72 hours, contact your health care provider.  Middle-ear infections can cause long-term problems if not treated. They can lead to:    Infection in other parts of the head    Permanent hearing loss    Paralysis of a nerve in your face  If you have a middle-ear infection that doesn t get better, you may need to see an ear, nose, and throat specialist (otolaryngologist). You may need a CT scan or MRI to check for head and neck cancer.  Ear tubes  Sometimes fluid stays in the middle ear even after you take antibiotics and the infection goes away. In this case, your health care provider may suggest that a small tube be placed in your ear. The tube is put at the opening of the eardrum. The tube keeps fluid from building up and relieves pressure in the middle ear. It can also help you hear better. This surgery is called myringotomy. It is not often done in adults.  The tubes usually fall out on their own after 6 months to a year.    6307-5408 [employer]. [address] [place] [address]. All rights reserved. This information is not intended as a substitute for professional medical care. Always follow your healthcare professional's instructions.        Understanding Folliculitis  Folliculitis is when hair follicles become inflamed. Follicles are the tiny holes from which hair grows out of your skin. This skin condition can occur any place on the body where hair grows. But it s often found on the neck, face, and scalp.  How to say it  [id number]    What causes folliculitis?  An infection or irritation can cause this skin condition. It may be from bacteria or a fungus. The condition can also happen from a wound or irritation of the skin. Shaving is a common cause. Some cases may come from taking certain medicines, such as those that treat acne.  Symptoms of folliculitis  This skin condition tends to develop quickly. It looks like little pimples on a base of a red, inflamed hair follicles. These bumps may ooze pus. They may also be:    Itchy    Painful    Red    Swollen  Treatment for folliculitis  Treatment depends on the cause of the inflammation. In some cases, this skin condition will go away on its own in a few days. But it may return. Treatment options include:    Warm compress. Putting a warm, wet washcloth on the inflamed skin may help.    Medicine. Many skin (topical) and oral medicines are available. Antibiotics are used for bacterial infections. Antifungal medicines are best for fungal infections.    Good hygiene. Keeping the skin clean can help. Use a clean razor when shaving. Prevent ingrown hairs after shaving. This can reduce folliculitis in the beard area. Avoid any substances that bother your skin.  When to call your healthcare provider  Call your healthcare provider right away if you have any of these:    Fever of 100.4 F (38 C) or higher, or as directed    Pain that gets worse    Symptoms that don t get better, or get worse    New symptoms   Date Last Reviewed: 5/1/2016 2000-2017 [employer]. [address] [place] [address], [place] [address]. All rights reserved. This information is not intended as a substitute for professional medical care. Always follow your healthcare professional's instructions.                Follow-ups after your visit        Follow-up notes from your care team     Return if symptoms worsen or fail to improve.      Who to contact     Normal or non-critical lab and imaging results will be communicated to you by  MyChart, letter or phone within 4 business days after the clinic has received the results. If you do not hear from us within 7 days, please contact the clinic through [employer] or phone. If you have a critical or abnormal lab result, we will notify you by phone as soon as possible.  Submit refill requests through [employer] or call your pharmacy and they will forward the refill request to us. Please allow 3 business days for your refill to be completed.          If you need to speak with a  for additional information , please call: [phone]             Additional Information About Your Visit        [employer] Information     [employer] gives you secure access to your electronic health record. If you see a primary care provider, you can also send messages to your care team and make appointments. If you have questions, please call your primary care clinic.  If you do not have a primary care provider, please call [phone] and they will assist you.        Care EveryWhere ID     This is your Care EveryWhere ID. This could be used by other organizations to access your [facility] medical records  [id number]        Your Vitals Were     Pulse Temperature Respirations Last Period Pulse Oximetry Breastfeeding?    111 98.3  F (36.8  C) (Oral) 20 04/10/2018 (Approximate) 96% No    BMI (Body Mass Index)                   32.39 kg/m2            Blood Pressure from Last 3 Encounters:   04/26/18 151/89   02/14/18 138/88   01/31/18 (!) 150/103    Weight from Last 3 Encounters:   04/26/18 187 lb 3.2 oz (84.9 kg)   02/14/18 187 lb (84.8 kg)   01/31/18 189 lb 6.4 oz (85.9 kg)              We Performed the Following     Strep, Rapid Screen          Today's Medication Changes          These changes are accurate as of 4/26/18  2:11 PM.  If you have any questions, ask your nurse or doctor.               Start taking these medicines.        Dose/Directions    4X PROBIOTIC Tabs   Used for:  Folliculitis   Started by:  Nuha  HINA Hi        Dose:  1 tablet   Take 1 tablet by mouth daily   Quantity:  30 tablet   Refills:  0       cephALEXin 500 MG capsule   Commonly known as:  KEFLEX   Used for:  Acute suppurative otitis media of left ear without spontaneous rupture of tympanic membrane, recurrence not specified, Folliculitis   Started by:  Sussy Breen NP        Dose:  500 mg   Take 1 capsule (500 mg) by mouth 4 times daily   Quantity:  30 capsule   Refills:  0       fluconazole 150 MG tablet   Commonly known as:  DIFLUCAN   Used for:  Folliculitis   Started by:  Sussy Breen NP        Dose:  150 mg   Take 1 tablet (150 mg) by mouth every 3 days   Quantity:  4 tablet   Refills:  0            Where to get your medicines      These medications were sent to [facility] [place] [address] [facility] [place]  [address] [facility] [place] [address]     Phone:  [phone]     4X PROBIOTIC Tabs    cephALEXin 500 MG capsule    fluconazole 150 MG tablet                Primary Care Provider Office Phone # Fax #    Citlali Aury Newsome PA-C [phone] [address] [place] [address]        Equal Access to Services     [facility]: Hadii aad ku hadasho Soomaali, waaxda luqadaha, qaybta kaalmada adeegyada, juan f sosa . So [facility] [phone].    ATENCIÓN: Si habla español, tiene a holliday disposición servicios gratuitos de asistencia lingüística. [facility] [phone].    We comply with applicable federal civil rights laws and [place] laws. We do not discriminate on the basis of race, color, national origin, age, disability, sex, sexual orientation, or gender identity.            Thank you!     Thank you for choosing [facility]  for your care. Our goal is always to provide you with excellent care. Hearing back from our patients is one way we can continue to improve our services. Please take a few minutes to complete the written survey that you may  receive in the mail after your visit with us. Thank you!             Your Updated Medication List - Protect others around you: Learn how to safely use, store and throw away your medicines at www.disposemymeds.org.          This list is accurate as of 4/26/18  2:11 PM.  Always use your most recent med list.                   Brand Name Dispense Instructions for use Diagnosis    4X PROBIOTIC Tabs     30 tablet    Take 1 tablet by mouth daily    Folliculitis       cephALEXin 500 MG capsule    KEFLEX    30 capsule    Take 1 capsule (500 mg) by mouth 4 times daily    Acute suppurative otitis media of left ear without spontaneous rupture of tympanic membrane, recurrence not specified, Folliculitis       fluconazole 150 MG tablet    DIFLUCAN    4 tablet    Take 1 tablet (150 mg) by mouth every 3 days    Folliculitis       fluticasone 50 MCG/ACT spray    FLONASE    1 Bottle    Spray 1-2 sprays into both nostrils daily    Benign paroxysmal positional vertigo of right ear

## 2018-04-26 NOTE — PROGRESS NOTES
SUBJECTIVE:  Rosemarie Michel  is a 48 year old  female  who presents with the following concerns;              Symptoms: Present Comment   Fever/Chills     Fatigue     Muscle Aches     Eye Irritation     Sneezing     Nasal Eric/Drg x    Sinus Pressure/Pain     Loss of smell     Dental pain     Sore Throat x Felt like chip got stuck in throat    Swollen Glands x Tender to touch   Ear Pain/Fullness x Left ear into throat   Cough     Wheeze     Chest Pain     Shortness of breath     Rash     Other       Symptom duration:  monday   Sympom severity:  mild   Treatments tried:  none   Contacts:  none       Medications updated and reviewed.  Past, family and surgical history is updated and reviewed in the record.  Patient Active Problem List    Diagnosis Date Noted     Hypertension goal BP (blood pressure) < 140/90 02/02/2018     Priority: Medium     BPPV (benign paroxysmal positional vertigo) 10/27/2017     Priority: Medium     Excessive bleeding in premenopausal period 01/26/2016     Priority: Medium     Hyperlipidemia LDL goal <160 01/09/2014     Priority: Medium     Past Medical History:   Diagnosis Date     Abnormal Pap smear 2000    , normal paps since     Former smoker     quit 2011     Mastodynia sept 2014    left breast  tenderness     Menorrhagia      Pregnancy induced hypertension       Family History   Problem Relation Age of Onset     DIABETES Mother 57     type 2     Hyperlipidemia Mother      DIABETES Father 60     type 2     CANCER Father 62     bladder     Hyperlipidemia Father      DIABETES Brother 42     type 2     Hyperlipidemia Paternal Grandmother      ROS:  Other than noted above, general, HEENT, respiratory, cardiac and gastrointestinal systems are negative.    OBJECTIVE:  GENERAL:  Alert, no acute distress  EYES:  PERRL, EOM normal, conjunctiva and lids normal  HEENT:  Ears and R TM normal, oral mucosa POSITIVE for L TM erythematous, bulging with loss of landmarks. Posterior oropharynx  erythematous.   RESP:  Lungs clear to auscultation.  CV:  Normal rate, regular rhythm, no murmur or gallop.  LYMPHATICS:  No cervical, supraclavicular adenopathy  SKIN:  POSITIVE for folliculitis to arms bilat.  Has been present for > 1 year per pt.   NEURO:  Alert, oriented, speech and mentation normal    Assessment/Plan:     ICD-10-CM    1. Acute suppurative otitis media of left ear without spontaneous rupture of tympanic membrane, recurrence not specified H66.002 cephALEXin (KEFLEX) 500 MG capsule   2. Throat pain R07.0 Strep, Rapid Screen   3. Folliculitis L73.9 cephALEXin (KEFLEX) 500 MG capsule     fluconazole (DIFLUCAN) 150 MG tablet     Probiotic Product (4X PROBIOTIC) TABS    arms; ongoing        See patient instructions: take full course of antibiotics with daily probiotic.  Follow up for any worsening or persistent symptoms.     Sussy Breen, CHENCHO, FNP-BC

## 2018-04-26 NOTE — PATIENT INSTRUCTIONS
Otitis Media (Middle-Ear Infection) in Adults  Otitis media is another name for a middle-ear infection. It means an infection behind your eardrum. This kind of ear infection can happen after any condition that keeps fluid from draining from the middle ear. These conditions include allergies, a cold, a sore throat, or a respiratory infection.  Middle-ear infections are common in children, but they can also happen in adults. An ear infection in an adult may mean a more serious problem than in a child. So you may need additional tests. If you have an ear infection, you should see your health care provider for treatment.  What are the types of middle-ear infections?  Infections can affect the middle ear in several ways. They are:    Acute otitis media. This middle-ear infection occurs suddenly. It causes swelling and redness. Fluid and mucus become trapped inside the ear. You can have a fever and ear pain.    Otitis media with effusion. Fluid (effusion) and mucus build up in the middle ear after the infection goes away. You may feel like your middle ear is full. This can continue for months and may affect your hearing.    Chronic otitis media with effusion. Fluid (effusion) remains in the middle ear for a long time. Or it builds up again and again, even though there is no infection. This type of middle-ear infection may be hard to treat. It may also affect your hearing.  Who is more likely to get a middle-ear infection?  You are more likely to get an ear infection if you:    Smoke or are around someone who smokes    Have seasonal or year-round allergy symptoms    Have a cold or other upper respiratory infection  What causes a middle-ear infection?  The middle ear connects to the throat by a canal called the eustachian tube. This tube helps even out the pressure between the outer ear and the inner ear. A cold or allergy can irritate the tube or cause the area around it to swell. This can keep fluid from draining from  the middle ear. The fluid builds up behind the eardrum. Bacteria and viruses can grow in this fluid. The bacteria and viruses cause the middle-ear infection.  What are the symptoms of a middle-ear infection?  Common symptoms of a middle-ear infection in adults are:    Pain in 1 or both ears    Drainage from the ear    Muffled hearing    Sore throat   You may also have a fever. Rarely, your balance can be affected.  These symptoms may be the same as for other conditions. It s important to talk with your health care provider if you think you have a middle-ear infection. If you have a high fever, severe pain behind your ear, or paralysis in your face, see your provider as soon as you can.  How is a middle-ear infection diagnosed?  Your health care provider will take a medical history and do a physical exam. He or she will look at the outer ear and eardrum with an otoscope. The otoscope is a lighted tool that lets your provider see inside the ear. A pneumatic otoscope blows a puff of air into the ear to check how well your eardrum moves. If you eardrum doesn t move well, it may mean you have fluid behind it.  Your provider may also do a test called tympanometry. This test tells how well the middle ear is working. It can find any changes in pressure in the middle ear. Your provider may test your hearing with a tuning fork.  How is a middle-ear infection treated?  A middle-ear infection may be treated with:    Antibiotics, taken by mouth or as ear drops    Medication for pain    Decongestants, antihistamines, or nasal steroids  Your health care provider may also have you try autoinsufflation. This helps adjust the air pressure in your ear. For this, you pinch your nose and gently exhale. This forces air back through the eustachian tube.  The exact treatment for your ear infection will depend on the type of infection you have. In general, if your symptoms don t get better in 48 to 72 hours, contact your health care  provider.  Middle-ear infections can cause long-term problems if not treated. They can lead to:    Infection in other parts of the head    Permanent hearing loss    Paralysis of a nerve in your face  If you have a middle-ear infection that doesn t get better, you may need to see an ear, nose, and throat specialist (otolaryngologist). You may need a CT scan or MRI to check for head and neck cancer.  Ear tubes  Sometimes fluid stays in the middle ear even after you take antibiotics and the infection goes away. In this case, your health care provider may suggest that a small tube be placed in your ear. The tube is put at the opening of the eardrum. The tube keeps fluid from building up and relieves pressure in the middle ear. It can also help you hear better. This surgery is called myringotomy. It is not often done in adults.  The tubes usually fall out on their own after 6 months to a year.    5849-9334 [employer]. [address] [place] [address]. All rights reserved. This information is not intended as a substitute for professional medical care. Always follow your healthcare professional's instructions.        Understanding Folliculitis  Folliculitis is when hair follicles become inflamed. Follicles are the tiny holes from which hair grows out of your skin. This skin condition can occur any place on the body where hair grows. But it s often found on the neck, face, and scalp.  How to say it  [id number]   What causes folliculitis?  An infection or irritation can cause this skin condition. It may be from bacteria or a fungus. The condition can also happen from a wound or irritation of the skin. Shaving is a common cause. Some cases may come from taking certain medicines, such as those that treat acne.  Symptoms of folliculitis  This skin condition tends to develop quickly. It looks like little pimples on a base of a red, inflamed hair follicles. These bumps may ooze pus. They may also  be:    Itchy    Painful    Red    Swollen  Treatment for folliculitis  Treatment depends on the cause of the inflammation. In some cases, this skin condition will go away on its own in a few days. But it may return. Treatment options include:    Warm compress. Putting a warm, wet washcloth on the inflamed skin may help.    Medicine. Many skin (topical) and oral medicines are available. Antibiotics are used for bacterial infections. Antifungal medicines are best for fungal infections.    Good hygiene. Keeping the skin clean can help. Use a clean razor when shaving. Prevent ingrown hairs after shaving. This can reduce folliculitis in the beard area. Avoid any substances that bother your skin.  When to call your healthcare provider  Call your healthcare provider right away if you have any of these:    Fever of 100.4 F (38 C) or higher, or as directed    Pain that gets worse    Symptoms that don t get better, or get worse    New symptoms   Date Last Reviewed: 5/1/2016 2000-2017 [employer]. [address] [place] [address], [place] [address]. All rights reserved. This information is not intended as a substitute for professional medical care. Always follow your healthcare professional's instructions.

## 2018-04-27 LAB
BACTERIA SPEC CULT: NORMAL
SPECIMEN SOURCE: NORMAL

## 2018-06-28 ENCOUNTER — OFFICE VISIT (OUTPATIENT)
Dept: AUDIOLOGY | Facility: CLINIC | Age: 48
End: 2018-06-28
Payer: COMMERCIAL

## 2018-06-28 ENCOUNTER — OFFICE VISIT (OUTPATIENT)
Dept: OTOLARYNGOLOGY | Facility: CLINIC | Age: 48
End: 2018-06-28
Payer: COMMERCIAL

## 2018-06-28 VITALS
OXYGEN SATURATION: 97 % | RESPIRATION RATE: 12 BRPM | HEART RATE: 100 BPM | DIASTOLIC BLOOD PRESSURE: 96 MMHG | WEIGHT: 190 LBS | SYSTOLIC BLOOD PRESSURE: 170 MMHG | HEIGHT: 63 IN | BODY MASS INDEX: 33.66 KG/M2

## 2018-06-28 DIAGNOSIS — M50.30 DDD (DEGENERATIVE DISC DISEASE), CERVICAL: Primary | ICD-10-CM

## 2018-06-28 DIAGNOSIS — R42 DIZZINESS AND GIDDINESS: Primary | ICD-10-CM

## 2018-06-28 DIAGNOSIS — H81.13 BENIGN PAROXYSMAL POSITIONAL VERTIGO, BILATERAL: ICD-10-CM

## 2018-06-28 DIAGNOSIS — R42 DIZZINESS: ICD-10-CM

## 2018-06-28 PROCEDURE — 92550 TYMPANOMETRY & REFLEX THRESH: CPT | Performed by: AUDIOLOGIST

## 2018-06-28 PROCEDURE — 99207 ZZC NO CHARGE LOS: CPT | Performed by: AUDIOLOGIST

## 2018-06-28 PROCEDURE — 92557 COMPREHENSIVE HEARING TEST: CPT | Performed by: AUDIOLOGIST

## 2018-06-28 PROCEDURE — 99203 OFFICE O/P NEW LOW 30 MIN: CPT | Performed by: OTOLARYNGOLOGY

## 2018-06-28 NOTE — PATIENT INSTRUCTIONS
General Scheduling Information  To schedule your CT/MRI scan, please contact [facility] at [phone] OR [facility] at [phone]    To schedule your Surgery, please contact our Specialty Schedulers at [phone]      ENT Clinic Locations Clinic Hours Telephone Number     [facility]  [address] [facility]  [place] [address]     2nd & 4th Thursday:           8:00am - 12:00pm   To schedule/reschedule an appointment with   Dr. Christianson,   please contact our   Specialty Scheduling Department at:     [phone]       [facility]aitlynn Parker  [address] [place] [address] [place] [address]   Monday:             8:00am -- 4:30 pm      1st, 3rd & 5th Thursday:           8:00am - 12:00pm      Kaitlynn [address] [address]   Wednesday:       9:00 -- 4:30 pm

## 2018-06-28 NOTE — PROGRESS NOTES
ENT Consultation    History of Present Illness - Rosemarie Michel is a 48 year old female with vertigo and ear pain. Vertigo first started years ago after skydiving. Patient has been experiencing sharp ear pain on the right. Vertigo was initially noted with movement, but does take 1 days to resolve. Patient was treated in the past by Epley performed at that time which gave relief.   A few days ago, patient started feeling pain under the right angle of the jaw, sharp with palpation and yawning.   Patient clenches her teeth, and she pushes her teeth with her tongue. She has not been to her dentist any time recently.      Patient has has cervical disc disease and is being treated with therapy.    Patient has a history of a jaw dislocation many years ago.     Past Medical History -   Past Medical History:   Diagnosis Date     Abnormal Pap smear 2000    , normal paps since     Former smoker     quit 2011     Mastodynia sept 2014    left breast  tenderness     Menorrhagia      Pregnancy induced hypertension        Current Medications -   Current Outpatient Prescriptions:      cephALEXin (KEFLEX) 500 MG capsule, Take 1 capsule (500 mg) by mouth 4 times daily, Disp: 30 capsule, Rfl: 0     fluconazole (DIFLUCAN) 150 MG tablet, Take 1 tablet (150 mg) by mouth every 3 days, Disp: 4 tablet, Rfl: 0     fluticasone (FLONASE) 50 MCG/ACT spray, Spray 1-2 sprays into both nostrils daily, Disp: 1 Bottle, Rfl: 1     Probiotic Product (4X PROBIOTIC) TABS, Take 1 tablet by mouth daily, Disp: 30 tablet, Rfl: 0    Allergies -   Allergies   Allergen Reactions     Latex      Shellfish Allergy        Social History -   Social History     Social History     Marital status:      Spouse name: Phan     Number of children: 1     Years of education: N/A     Occupational History      [employer]     Social History Main Topics     Smoking status: Current Every Day Smoker     Packs/day: 0.25     Years: 21.00     Types: Cigarettes      Smokeless tobacco: Never Used      Comment: 6-7 cigs per day     Alcohol use 0.0 oz/week     0 Standard drinks or equivalent per week      Comment:  5 Beers Through Whole Pregnancy     Drug use: No     Sexual activity: Yes     Partners: Male     Birth control/ protection: Condom     Other Topics Concern     Parent/Sibling W/ Cabg, Mi Or Angioplasty Before 65f 55m? No     Social History Narrative       Family History -   Family History   Problem Relation Age of Onset     Diabetes Mother 57     type 2     Hyperlipidemia Mother      Diabetes Father 60     type 2     Cancer Father 62     bladder     Hyperlipidemia Father      Diabetes Brother 42     type 2     Hyperlipidemia Paternal Grandmother        Review of Systems - As per HPI and PMHx, otherwise review of system review of the head and neck negative.    This document serves as a record of the services and decisions personally performed and made by Rodríguez Christianson MD. It was created on his behalf by Saeed Walker, a trained medical scribe. The creation of this document is based the provider's statements to the medical scribe.  Saeed Walker June 28, 2018 11:10 AM     Physical Exam  There were no vitals taken for this visit.  BMI: There is no height or weight on file to calculate BMI.    General - The patient is well nourished and well developed, and appears to have good nutritional status.  Alert and oriented to person and place, answers questions and cooperates with examination appropriately.    Skin - No suspicious lesions or rashes.  Respiration - No respiratory distress.  Head and Face - Normocephalic and atraumatic, with no gross asymmetry noted of the contour of the facial features.  The facial nerve is intact, with strong symmetric movements. Tenderness of the right TMJ     Voice and Breathing - The patient was breathing comfortably without the use of accessory muscles. There was no wheezing, stridor, or stertor.  The patients voice was clear and  strong, and had appropriate pitch and quality.    Ears - Bilateral pinna and EACs with normal appearing overlying skin. Tympanic membrane intact with good mobility on pneumatic otoscopy bilaterally. Bony landmarks of the ossicular chain are normal. The tympanic membranes are normal in appearance. No retraction, perforation, or masses.  No fluid or purulence was seen in the external canal or the middle ear.     Eyes - Extraocular movements intact.  Sclera were not icteric or injected, conjunctiva were pink and moist.    Mouth - Examination of the oral cavity showed pink, healthy oral mucosa. No lesions or ulcerations noted.  The tongue was mobile and midline, and the dentition were in good condition.      Throat - The walls of the oropharynx were smooth, pink, moist, symmetric, and had no lesions or ulcerations.  The tonsillar pillars and soft palate were symmetric.  The uvula was midline on elevation.    Neck - Normal midline excursion of the laryngotracheal complex during swallowing.  Full range of motion on passive movement.  Palpation of the occipital, submental, submandibular, internal jugular chain, and supraclavicular nodes did not demonstrate any abnormal lymph nodes or masses.  The carotid pulse was palpable bilaterally.  Palpation of the thyroid was soft and smooth, with no nodules or goiter appreciated.  The trachea was mobile and midline.    Nose - External contour is symmetric, no gross deflection or scars.  Nasal mucosa is pink and moist with no abnormal mucus.  The septum was midline and non-obstructive, turbinates of normal size and position.  No polyps, masses, or purulence noted on examination.    Neuro - Nonfocal neuro exam is normal, CN 2 through 12 intact, normal gait and muscle tone.    Performed in clinic today:  Audiologic Studies - An audiogram and tympanogram were performed today as part of the evaluation and personally reviewed. The tympanogram shows normal Type A curves, with normal canal  volumes and middle ear pressures.  There is no sign of eustachian tube dysfunction or middle ear effusion.  The audiogram was also normal.  The sensorineural hearing was age-appropriate, with no evidence of conductive hearing loss or significant asymmetry.    A/P - Rosemarie Michel is a 48 year old female with TMD which may be related to her ongoing vertigo symptoms right TMD, cervical disc disease. Referred to return to PT for Epley therapy and cervical disc disease. Referred to Head, Neck and pain clinic for TMD treatment. We did briefly discuss the potential for a referal to a surgeon; she wishes to hold on this option for now.     Rodríguez Christianson MD .     The information in this document, created by the medical scribe for me, accurately reflects the services I personally performed and the decisions made by me. I have reviewed and approved this document for accuracy prior to leaving the patient care area.  Rodríguez Christianson MD  11:10 AM, 06/28/18

## 2018-06-28 NOTE — MR AVS SNAPSHOT
After Visit Summary   6/28/2018    Rosemarie Michel    MRN: [id number]           Patient Information     Date Of Birth          1970        Visit Information        Provider Department      6/28/2018 10:30 AM [facility]        Today's Diagnoses     Dizziness and giddiness    -  1       Follow-ups after your visit        Who to contact     If you have questions or need follow up information about today's clinic visit or your schedule please contact [facility] directly at [phone].  Normal or non-critical lab and imaging results will be communicated to you by [employer], letter or phone within 4 business days after the clinic has received the results. If you do not hear from us within 7 days, please contact the clinic through [employer] or phone. If you have a critical or abnormal lab result, we will notify you by phone as soon as possible.  Submit refill requests through [employer] or call your pharmacy and they will forward the refill request to us. Please allow 3 business days for your refill to be completed.          Additional Information About Your Visit        MyChart Information     [employer] gives you secure access to your electronic health record. If you see a primary care provider, you can also send messages to your care team and make appointments. If you have questions, please call your primary care clinic.  If you do not have a primary care provider, please call [phone] and they will assist you.        Care EveryWhere ID     This is your Care EveryWhere ID. This could be used by other organizations to access your [facility] medical records  [id number]         Blood Pressure from Last 3 Encounters:   04/26/18 151/89   02/14/18 138/88   01/31/18 (!) 150/103    Weight from Last 3 Encounters:   04/26/18 187 lb 3.2 oz (84.9 kg)   02/14/18 187 lb (84.8 kg)   01/31/18 189 lb 6.4 oz (85.9 kg)              We Performed the Following      AUDIOGRAM/TYMPANOGRAM - INTERFACE     COMPREHENSIVE HEARING TEST     TYMPANOMETRY AND REFLEX THRESHOLD MEASUREMENTS        Primary Care Provider Office Phone # Fax #    Citlali Aury Newsome PA-C [phone] [address] [facility] [address] [facility] [address] [place] [address]        Equal Access to Services     KAYLA SCOTT : Hadii aad ku hadasho Soomaali, waaxda luqadaha, qaybta kaalmada adeegyada, waxay idiin hayaan adeeg khdorita laSánchezshahid mckeon. So [facility] [phone].    ATENCIÓN: Si habla español, tiene a holliday disposición servicios gratuitos de asistencia lingüística. Llame al [phone].    We comply with applicable federal civil rights laws and [place] laws. We do not discriminate on the basis of race, color, national origin, age, disability, sex, sexual orientation, or gender identity.            Thank you!     [facility]  [facility]. Our goal is always to provide you with excellent care. Hearing back from our patients is one way we can continue to improve our services. Please take a few minutes to complete the written survey that you may receive in the mail after your visit with us. Thank you!             Your Updated Medication List - Protect others around you: Learn how to safely use, store and throw away your medicines at www.disposemymeds.org.          This list is accurate as of 6/28/18 11:19 AM.  Always use your most recent med list.                   Brand Name Dispense Instructions for use Diagnosis    4X PROBIOTIC Tabs     30 tablet    Take 1 tablet by mouth daily    Folliculitis       cephALEXin 500 MG capsule    KEFLEX    30 capsule    Take 1 capsule (500 mg) by mouth 4 times daily    Acute suppurative otitis media of left ear without spontaneous rupture of tympanic membrane, recurrence not specified, Folliculitis       fluconazole 150 MG tablet    DIFLUCAN    4 tablet    Take 1 tablet (150 mg) by mouth every 3 days    Folliculitis       fluticasone 50 MCG/ACT spray     FLONASE    1 Bottle    Spray 1-2 sprays into both nostrils daily    Benign paroxysmal positional vertigo of right ear

## 2018-06-28 NOTE — MR AVS SNAPSHOT
After Visit Summary   6/28/2018    Rosemarie Michel    MRN: [id number]           Patient Information     Date Of Birth          1970        Visit Information        Provider Department      6/28/2018 11:00 AM Rodríguez Christianson MD [facility]        Today's Diagnoses     DDD (degenerative disc disease), cervical    -  1    Dizziness        Benign paroxysmal positional vertigo, bilateral          Care Instructions    General Scheduling Information  To schedule your CT/MRI scan, please contact [facility] at [phone] OR [facility] at [phone]    To schedule your Surgery, please contact our Specialty Schedulers at [phone]      ENT Clinic Locations Clinic Hours Telephone Number     [facility]  [address]  [place] [address]     2nd & 4th Thursday:           8:00am - 12:00pm   To schedule/reschedule an appointment with   Dr. Christianson,   please contact our   Specialty Scheduling Department at:     [phone]       [address] [place] [address]   Monday:             8:00am -- 4:30 pm      1st, 3rd & 5th Thursday:           8:00am - 12:00pm      [address] [address]   Wednesday:       9:00 -- 4:30 pm                    Follow-ups after your visit        Additional Services     PHYSICAL THERAPY REFERRAL       *This therapy referral will be filtered to a centralized scheduling office at [facility] and the patient will receive a call to schedule an appointment at a [facility] location most convenient for them. *     [facility] provides Physical Therapy evaluation and treatment and many specialty services across the [place] system.  If requesting a specialty program, please choose from the list below.    If you have not heard from the scheduling office within 2 business days, please call [phone] for all locations, with the exception of  "[place], please call [phone] and [place], please call [phone]  Treatment: Evaluation & Treatment  Special Instructions/Modalities: Epley  Special Programs: Balance/Vestibular    Please be aware that coverage of these services is subject to the terms and limitations of your health insurance plan.  Call member services at your health plan with any benefit or coverage questions.      **Note to Provider:  If you are referring outside of [facility] for the therapy appointment, please list the name of the location in the \"special instructions\" above, print the referral and give to the patient to schedule the appointment.                  Who to contact     If you have questions or need follow up information about today's clinic visit or your schedule please contact [facility] directly at [phone].  Normal or non-critical lab and imaging results will be communicated to you by MyChart, letter or phone within 4 business days after the clinic has received the results. If you do not hear from us within 7 days, please contact the clinic through [employer] or phone. If you have a critical or abnormal lab result, we will notify you by phone as soon as possible.  Submit refill requests through [employer] or call your pharmacy and they will forward the refill request to us. Please allow 3 business days for your refill to be completed.          Additional Information About Your Visit        [employer] Information     [employer] gives you secure access to your electronic health record. If you see a primary care provider, you can also send messages to your care team and make appointments. If you have questions, please call your primary care clinic.  If you do not have a primary care provider, please call [phone] and they will assist you.        Care EveryWhere ID     This is your Care EveryWhere ID. This could be used by other organizations to access your [facility] medical records  [id number]         Blood " Pressure from Last 3 Encounters:   04/26/18 151/89   02/14/18 138/88   01/31/18 (!) 150/103    Weight from Last 3 Encounters:   04/26/18 84.9 kg (187 lb 3.2 oz)   02/14/18 84.8 kg (187 lb)   01/31/18 85.9 kg (189 lb 6.4 oz)              We Performed the Following     PHYSICAL THERAPY REFERRAL        Primary Care Provider Office Phone # Fax #    Citlali Aury Newsome PA-C [phone] [address] [place] [address]        Equal Access to Services     [facility]: Hadii aad ku hadasho Soblaire, waaxda luqadaha, qaybta kaalmada adeegyada, juan f sosa . So [facility] [phone].    ATENCIÓN: Si habla español, tiene a holliday disposición servicios gratuitos de asistencia lingüística. [facility] [phone].    We comply with applicable federal civil rights laws and [place] laws. We do not discriminate on the basis of race, color, national origin, age, disability, sex, sexual orientation, or gender identity.            Thank you!     Thank you for choosing [facility]  for your care. Our goal is always to provide you with excellent care. Hearing back from our patients is one way we can continue to improve our services. Please take a few minutes to complete the written survey that you may receive in the mail after your visit with us. Thank you!             Your Updated Medication List - Protect others around you: Learn how to safely use, store and throw away your medicines at www.disposemymeds.org.          This list is accurate as of 6/28/18  1:09 PM.  Always use your most recent med list.                   Brand Name Dispense Instructions for use Diagnosis    4X PROBIOTIC Tabs     30 tablet    Take 1 tablet by mouth daily    Folliculitis       cephALEXin 500 MG capsule    KEFLEX    30 capsule    Take 1 capsule (500 mg) by mouth 4 times daily    Acute suppurative otitis media of left ear without spontaneous rupture of tympanic membrane, recurrence not  specified, Folliculitis       fluconazole 150 MG tablet    DIFLUCAN    4 tablet    Take 1 tablet (150 mg) by mouth every 3 days    Folliculitis       fluticasone 50 MCG/ACT spray    FLONASE    1 Bottle    Spray 1-2 sprays into both nostrils daily    Benign paroxysmal positional vertigo of right ear

## 2018-06-28 NOTE — LETTER
6/28/2018         RE: Rosemarie Michel  [address]  [facility] [address]        Dear Colleague,    Thank you for referring your patient, Rosemarie Michel, to the [facility]. Please see a copy of my visit note below.    ENT Consultation    History of Present Illness - Rosemarie Michel is a 48 year old female with vertigo and ear pain. Vertigo first started years ago after skydiving. Patient has been experiencing sharp ear pain on the right. Vertigo was initially noted with movement, but does take 1 days to resolve. Patient was treated in the past by Epley performed at that time which gave relief.   A few days ago, patient started feeling pain under the right angle of the jaw, sharp with palpation and yawning.   Patient clenches her teeth, and she pushes her teeth with her tongue. She has not been to her dentist any time recently.      Patient has has cervical disc disease and is being treated with therapy.    Patient has a history of a jaw dislocation many years ago.     Past Medical History -   Past Medical History:   Diagnosis Date     Abnormal Pap smear 2000    , normal paps since     Former smoker     quit 2011     Mastodynia sept 2014    left breast  tenderness     Menorrhagia      Pregnancy induced hypertension        Current Medications -   Current Outpatient Prescriptions:      cephALEXin (KEFLEX) 500 MG capsule, Take 1 capsule (500 mg) by mouth 4 times daily, Disp: 30 capsule, Rfl: 0     fluconazole (DIFLUCAN) 150 MG tablet, Take 1 tablet (150 mg) by mouth every 3 days, Disp: 4 tablet, Rfl: 0     fluticasone (FLONASE) 50 MCG/ACT spray, Spray 1-2 sprays into both nostrils daily, Disp: 1 Bottle, Rfl: 1     Probiotic Product (4X PROBIOTIC) TABS, Take 1 tablet by mouth daily, Disp: 30 tablet, Rfl: 0    Allergies -   Allergies   Allergen Reactions     Latex      Shellfish Allergy        Social History -   Social History     Social History     Marital status:      Spouse  name: Phan     Number of children: 1     Years of education: N/A     Occupational History      [employer]     Social History Main Topics     Smoking status: Current Every Day Smoker     Packs/day: 0.25     Years: 21.00     Types: Cigarettes     Smokeless tobacco: Never Used      Comment: 6-7 cigs per day     Alcohol use 0.0 oz/week     0 Standard drinks or equivalent per week      Comment:  5 Beers Through Whole Pregnancy     Drug use: No     Sexual activity: Yes     Partners: Male     Birth control/ protection: Condom     Other Topics Concern     Parent/Sibling W/ Cabg, Mi Or Angioplasty Before 65f 55m? No     Social History Narrative       Family History -   Family History   Problem Relation Age of Onset     Diabetes Mother 57     type 2     Hyperlipidemia Mother      Diabetes Father 60     type 2     Cancer Father 62     bladder     Hyperlipidemia Father      Diabetes Brother 42     type 2     Hyperlipidemia Paternal Grandmother        Review of Systems - As per HPI and PMHx, otherwise review of system review of the head and neck negative.    This document serves as a record of the services and decisions personally performed and made by Rodríguez Christianson MD. It was created on his behalf by Saeed Walker, a trained medical scribe. The creation of this document is based the provider's statements to the medical scribe.  Saeed Walker June 28, 2018 11:10 AM     Physical Exam  There were no vitals taken for this visit.  BMI: There is no height or weight on file to calculate BMI.    General - The patient is well nourished and well developed, and appears to have good nutritional status.  Alert and oriented to person and place, answers questions and cooperates with examination appropriately.    Skin - No suspicious lesions or rashes.  Respiration - No respiratory distress.  Head and Face - Normocephalic and atraumatic, with no gross asymmetry noted of the contour of the facial features.  The facial nerve is  intact, with strong symmetric movements. Tenderness of the right TMJ     Voice and Breathing - The patient was breathing comfortably without the use of accessory muscles. There was no wheezing, stridor, or stertor.  The patients voice was clear and strong, and had appropriate pitch and quality.    Ears - Bilateral pinna and EACs with normal appearing overlying skin. Tympanic membrane intact with good mobility on pneumatic otoscopy bilaterally. Bony landmarks of the ossicular chain are normal. The tympanic membranes are normal in appearance. No retraction, perforation, or masses.  No fluid or purulence was seen in the external canal or the middle ear.     Eyes - Extraocular movements intact.  Sclera were not icteric or injected, conjunctiva were pink and moist.    Mouth - Examination of the oral cavity showed pink, healthy oral mucosa. No lesions or ulcerations noted.  The tongue was mobile and midline, and the dentition were in good condition.      Throat - The walls of the oropharynx were smooth, pink, moist, symmetric, and had no lesions or ulcerations.  The tonsillar pillars and soft palate were symmetric.  The uvula was midline on elevation.    Neck - Normal midline excursion of the laryngotracheal complex during swallowing.  Full range of motion on passive movement.  Palpation of the occipital, submental, submandibular, internal jugular chain, and supraclavicular nodes did not demonstrate any abnormal lymph nodes or masses.  The carotid pulse was palpable bilaterally.  Palpation of the thyroid was soft and smooth, with no nodules or goiter appreciated.  The trachea was mobile and midline.    Nose - External contour is symmetric, no gross deflection or scars.  Nasal mucosa is pink and moist with no abnormal mucus.  The septum was midline and non-obstructive, turbinates of normal size and position.  No polyps, masses, or purulence noted on examination.    Neuro - Nonfocal neuro exam is normal, CN 2 through 12  intact, normal gait and muscle tone.    Performed in clinic today:  Audiologic Studies - An audiogram and tympanogram were performed today as part of the evaluation and personally reviewed. The tympanogram shows normal Type A curves, with normal canal volumes and middle ear pressures.  There is no sign of eustachian tube dysfunction or middle ear effusion.  The audiogram was also normal.  The sensorineural hearing was age-appropriate, with no evidence of conductive hearing loss or significant asymmetry.    A/P - Rosemarie Michel is a 48 year old female with TMD which may be related to her ongoing vertigo symptoms right TMD, cervical disc disease. Referred to return to PT for Epley therapy and cervical disc disease. Referred to Head, Neck and pain clinic for TMD treatment. We did briefly discuss the potential for a referal to a surgeon; she wishes to hold on this option for now.     Rodríguez Christianson MD .     The information in this document, created by the medical scribe for me, accurately reflects the services I personally performed and the decisions made by me. I have reviewed and approved this document for accuracy prior to leaving the patient care area.  Rodríguez Christianson MD  11:10 AM, 06/28/18      Again, thank you for allowing me to participate in the care of your patient.        Sincerely,        Rodríguez Christianson MD, MD

## 2018-06-28 NOTE — PROGRESS NOTES
AUDIOLOGY REPORT     SUMMARY: Audiology visit completed. See audiogram for results.     RECOMMENDATIONS: Follow-up with ENT    Layton Raman Licensed Audiologist #[id number]

## 2018-08-03 ENCOUNTER — TELEPHONE (OUTPATIENT)
Dept: FAMILY MEDICINE | Facility: CLINIC | Age: 48
End: 2018-08-03

## 2018-08-03 DIAGNOSIS — I10 HYPERTENSION GOAL BP (BLOOD PRESSURE) < 140/90: Primary | ICD-10-CM

## 2018-08-03 NOTE — TELEPHONE ENCOUNTER
Patient eligible for PGen study, please reach out to her and see if she is interested (I have not discussed the study in detail with her).  BP remains elevated since my last visit with her  BP Readings from Last 6 Encounters:   06/28/18 (!) 170/96   04/26/18 151/89   02/14/18 138/88   01/31/18 (!) 150/103   01/19/18 132/89   10/26/17 132/82         Neyda Rao PA-C

## 2018-08-06 ENCOUNTER — TELEPHONE (OUTPATIENT)
Dept: NURSING | Facility: CLINIC | Age: 48
End: 2018-08-06

## 2018-08-06 NOTE — TELEPHONE ENCOUNTER
Spoke with patient and she is interested in the PGen blood pressure study. Visit #1 scheduled for 8/17 at [facility]

## 2018-08-13 NOTE — TELEPHONE ENCOUNTER
Spoke with patient and she is no longer interested in the PGen study at this time and has cancelled her appt for 8/17. She has been monitoring her BP at home the past week and all readings have been in the normal range.

## 2018-10-15 ENCOUNTER — OFFICE VISIT (OUTPATIENT)
Dept: FAMILY MEDICINE | Facility: CLINIC | Age: 48
End: 2018-10-15
Payer: COMMERCIAL

## 2018-10-15 VITALS
DIASTOLIC BLOOD PRESSURE: 103 MMHG | HEIGHT: 63 IN | HEART RATE: 102 BPM | WEIGHT: 185 LBS | BODY MASS INDEX: 32.78 KG/M2 | SYSTOLIC BLOOD PRESSURE: 150 MMHG

## 2018-10-15 DIAGNOSIS — N92.4 EXCESSIVE BLEEDING IN PREMENOPAUSAL PERIOD: ICD-10-CM

## 2018-10-15 DIAGNOSIS — I10 HYPERTENSION GOAL BP (BLOOD PRESSURE) < 140/90: Primary | ICD-10-CM

## 2018-10-15 DIAGNOSIS — H81.10 BENIGN PAROXYSMAL POSITIONAL VERTIGO, UNSPECIFIED LATERALITY: ICD-10-CM

## 2018-10-15 DIAGNOSIS — Z23 NEED FOR PROPHYLACTIC VACCINATION AND INOCULATION AGAINST INFLUENZA: ICD-10-CM

## 2018-10-15 LAB
ANION GAP SERPL CALCULATED.3IONS-SCNC: 7 MMOL/L (ref 3–14)
BUN SERPL-MCNC: 15 MG/DL (ref 7–30)
CALCIUM SERPL-MCNC: 8.7 MG/DL (ref 8.5–10.1)
CHLORIDE SERPL-SCNC: 103 MMOL/L (ref 94–109)
CO2 SERPL-SCNC: 27 MMOL/L (ref 20–32)
CREAT SERPL-MCNC: 0.64 MG/DL (ref 0.52–1.04)
GFR SERPL CREATININE-BSD FRML MDRD: >90 ML/MIN/1.7M2
GLUCOSE SERPL-MCNC: 83 MG/DL (ref 70–99)
POTASSIUM SERPL-SCNC: 3.5 MMOL/L (ref 3.4–5.3)
SODIUM SERPL-SCNC: 137 MMOL/L (ref 133–144)
TSH SERPL DL<=0.005 MIU/L-ACNC: 1.24 MU/L (ref 0.4–4)

## 2018-10-15 PROCEDURE — 84443 ASSAY THYROID STIM HORMONE: CPT | Performed by: PHYSICIAN ASSISTANT

## 2018-10-15 PROCEDURE — 80048 BASIC METABOLIC PNL TOTAL CA: CPT | Performed by: PHYSICIAN ASSISTANT

## 2018-10-15 PROCEDURE — 99214 OFFICE O/P EST MOD 30 MIN: CPT | Mod: 25 | Performed by: PHYSICIAN ASSISTANT

## 2018-10-15 PROCEDURE — 90471 IMMUNIZATION ADMIN: CPT | Performed by: PHYSICIAN ASSISTANT

## 2018-10-15 PROCEDURE — 90686 IIV4 VACC NO PRSV 0.5 ML IM: CPT | Performed by: PHYSICIAN ASSISTANT

## 2018-10-15 PROCEDURE — 36415 COLL VENOUS BLD VENIPUNCTURE: CPT | Performed by: PHYSICIAN ASSISTANT

## 2018-10-15 RX ORDER — HYDROCHLOROTHIAZIDE 25 MG/1
12.5 TABLET ORAL DAILY
Qty: 30 TABLET | Refills: 1 | Status: SHIPPED | OUTPATIENT
Start: 2018-10-15 | End: 2018-10-30

## 2018-10-15 NOTE — PROGRESS NOTES

## 2018-10-15 NOTE — PROGRESS NOTES
"  SUBJECTIVE:   Rosemarie Michel is a 48 year old female who presents to clinic today for the following health issues:      Patient is here today with concerns of Vertigo. She said it is better today. Started 3 weeks ago.     -Wanting her thyroid checked today.     -Having symptoms of menopause.     -She has concerns with ocd and anxiety. Not wanting medication but tips on how to help.        Problem list and histories reviewed & adjusted, as indicated.  Additional history:     Tearful today in clinic  Feeling \"off\" for the last year  More anxious -OCD tendencies have been more pronounced  Feels like her house has to be really clean - will clean the sink and then if she uses it will have to clean it right away again  Her  will comment to her that she needs to just \"let it go\" and that it is being \"lived in\" so not to worry so much about cleaning but this just makes her more irritable at him and everything  She doesn't want to miss her child \"growing up\" worrying about this but it seems to be consuming her mind more than normal  She talked to her mom about it who suggested she talk to her doctor  She really doesn't want to be put on medications  Says she was on paxil (she thinks) years ago and it made her feel like she was in a cloud the whole time. She doesn't want to feel like that    A few days ago had another episode of vertigo  Not as bad as her first episode and this one only lasted ~3 days  Someone at work suggested she make sure her thyroid is okay  Wondering if it could be hormone related  Still getting her periods - was having heavy periods with really big clots  Follows with OB/GYN at an outside clinic -had an ablation done a couple years ago but has continued to bleed but not hte heavy clots anymore  Her GYN did discuss a hysterectomy but she feels like it is only 2 chang of her cycle where the bleeding is heavier and something she can cope with now    Blood pressure also has been high more than it " "has been normal  She does check it at home as well and admits that more than half the time it is above 140/90      Current Outpatient Prescriptions   Medication Sig Dispense Refill     fluticasone (FLONASE) 50 MCG/ACT spray Spray 1-2 sprays into both nostrils daily 1 Bottle 1     Allergies   Allergen Reactions     Latex      Shellfish Allergy      BP Readings from Last 3 Encounters:   10/15/18 (!) 150/103   06/28/18 (!) 170/96   04/26/18 151/89    Wt Readings from Last 3 Encounters:   10/15/18 185 lb (83.9 kg)   06/28/18 190 lb (86.2 kg)   04/26/18 187 lb 3.2 oz (84.9 kg)                    Reviewed and updated as needed this visit by clinical staff       Reviewed and updated as needed this visit by Provider         ROS:  Remainder of ROS obtained and found to be negative other than that which was documented above      OBJECTIVE:     BP (!) 150/103  Pulse 102  Ht 5' 3\" (1.6 m)  Wt 185 lb (83.9 kg)  BMI 32.77 kg/m2  Body mass index is 32.77 kg/(m^2).  GENERAL: healthy, alert and no distress  EYES: Eyes grossly normal to inspection, PERRL and conjunctivae and sclerae normal  RESP: lungs clear to auscultation - no rales, rhonchi or wheezes  CV: regular rates and rhythm, normal S1 S2, no S3 or S4, no murmur, click or rub and no peripheral edema  ABDOMEN: soft, nontender and bowel sounds normal  SKIN: no suspicious lesions or rashes  NEURO: Normal strength and tone, mentation intact and speech normal  PSYCH: mentation appears normal, affect normal/bright    Diagnostic Test Results:  Results for orders placed or performed in visit on 10/15/18   TSH with free T4 reflex   Result Value Ref [place]    TSH 1.24 0.40 - 4.00 mU/L   Basic metabolic panel  (Ca, Cl, CO2, Creat, Gluc, K, Na, BUN)   Result Value Ref [facility]    Sodium 137 133 - 144 mmol/L    Potassium 3.5 3.4 - 5.3 mmol/L    Chloride 103 94 - 109 mmol/L    Carbon Dioxide 27 20 - 32 mmol/L    Anion Gap 7 3 - 14 mmol/L    Glucose 83 70 - 99 mg/dL    Urea Nitrogen 15 7 - " 30 mg/dL    Creatinine 0.64 0.52 - 1.04 mg/dL    GFR Estimate >90 >60 mL/min/1.7m2    GFR Estimate If Black >90 >60 mL/min/1.7m2    Calcium 8.7 8.5 - 10.1 mg/dL       ASSESSMENT/PLAN:     (I10) Hypertension goal BP (blood pressure) < 140/90  (primary encounter diagnosis)  Comment: Given majority of blood pressures are elevated above goal, will start blood pressure meds. It is possible that getting her blood pressure down to a more reasonable level may help with some of the other symptoms she has been having  Plan: TSH with free T4 reflex, Basic metabolic panel         (Ca, Cl, CO2, Creat, Gluc, K, Na, BUN),         hydrochlorothiazide (HYDRODIURIL) 25 MG tablet          Follow up in 3-4 weeks    (N92.4) Excessive bleeding in premenopausal period  Comment:   Plan: followed by OB/GYN at an outside clinic. Right now, symptoms are manageable    (H81.10) Benign paroxysmal positional vertigo, unspecified laterality  Comment:   Plan: discussed that once there has been an episode of vertigo, the likelihood of recurrent episodes higher. Perhaps the elevated blood pressure could be triggering the episodes as well?     (Z23) Need for prophylactic vaccination and inoculation against influenza  Comment:   Plan: FLU VACCINE, SPLIT VIRUS, IM (QUADRIVALENT)         [18394]- >3 YRS, Vaccine Administration,         Initial [44840]                Mervat Ulloa PA-C  [facility]

## 2018-10-15 NOTE — PATIENT INSTRUCTIONS
Labs today - I will have the results in the next 1-2 days and will release them through my chart    Start the hydrochlorothiazide (HCTZ) at 1/2 tablet once daily. Keep an eye on blood pressure - if it remains unchanged/elevated, let me know and we can increase to 1 full tablet    Plan on returning in 3-4 weeks for recheck and to see how things are going

## 2018-10-30 ENCOUNTER — OFFICE VISIT (OUTPATIENT)
Dept: FAMILY MEDICINE | Facility: CLINIC | Age: 48
End: 2018-10-30
Payer: COMMERCIAL

## 2018-10-30 VITALS
BODY MASS INDEX: 32.78 KG/M2 | SYSTOLIC BLOOD PRESSURE: 126 MMHG | WEIGHT: 185 LBS | DIASTOLIC BLOOD PRESSURE: 74 MMHG | HEIGHT: 63 IN | TEMPERATURE: 99 F

## 2018-10-30 DIAGNOSIS — F43.23 ADJUSTMENT DISORDER WITH MIXED ANXIETY AND DEPRESSED MOOD: Primary | ICD-10-CM

## 2018-10-30 DIAGNOSIS — H81.11 BENIGN PAROXYSMAL POSITIONAL VERTIGO OF RIGHT EAR: ICD-10-CM

## 2018-10-30 DIAGNOSIS — I10 HYPERTENSION GOAL BP (BLOOD PRESSURE) < 140/90: ICD-10-CM

## 2018-10-30 PROCEDURE — 99214 OFFICE O/P EST MOD 30 MIN: CPT | Performed by: PHYSICIAN ASSISTANT

## 2018-10-30 RX ORDER — CITALOPRAM HYDROBROMIDE 10 MG/1
TABLET ORAL
Qty: 30 TABLET | Refills: 0 | Status: SHIPPED | OUTPATIENT
Start: 2018-10-30 | End: 2018-11-16

## 2018-10-30 RX ORDER — HYDROCHLOROTHIAZIDE 25 MG/1
12.5 TABLET ORAL DAILY
Qty: 90 TABLET | Refills: 1 | Status: SHIPPED | OUTPATIENT
Start: 2018-10-30 | End: 2018-11-20

## 2018-10-30 RX ORDER — FLUTICASONE PROPIONATE 50 MCG
1-2 SPRAY, SUSPENSION (ML) NASAL DAILY
Qty: 1 BOTTLE | Refills: 1 | Status: SHIPPED | OUTPATIENT
Start: 2018-10-30 | End: 2019-05-01

## 2018-10-30 ASSESSMENT — PAIN SCALES - GENERAL: PAINLEVEL: NO PAIN (0)

## 2018-10-30 NOTE — PATIENT INSTRUCTIONS
Vitamin D - 4479-6400 units daily     START the celexa 1/2 tablet (5mg) once daily for 1-2 weeks then increase to 1 full tablet (10mg) daily    Check back with me in 1 month, sooner if needed. FEEL FREE TO MY CHART ME ANYTIME

## 2018-10-30 NOTE — PROGRESS NOTES
"  SUBJECTIVE:   Rosemarie Michel is a 48 year old female who presents to clinic today for the following health issues:      Hypertension Follow-up      Outpatient blood pressures are being checked at home.  Results are 103/68, 122/79, 115/79.     Low Salt Diet: no added salt    Anxiety Follow-Up    Status since last visit: No change    Other associated symptoms: OCD     Complicating factors:   Significant life event: Yes- Stress    Current substance abuse: None  Depression symptoms: No  DANII-7 SCORE 1/26/2016 2/14/2018   Total Score 0 4       DANII-7    Amount of exercise or physical activity: Walking     Problems taking medications regularly: No    Medication side effects: none    Diet: low salt            Problem list and histories reviewed & adjusted, as indicated.  Additional history:    Really pleased with her blood pressures  Denies any side effects from the medication    Still feeling very irritable - thought it was better the first few days when her blood pressures lowered but not lasting  Constantly \"going\" - feeling easily overwhelmed and can't explain why  No stress in the family - everyone is healhty  Financially they are doing well  She loves her job  Makes it more frustrating trying to figure out why she feels this way  Nervous about medications given her experience several years ago when placed on paxil    Current Outpatient Prescriptions   Medication Sig Dispense Refill     citalopram (CELEXA) 10 MG tablet Take 1/2 tablet (5mg) by mouth daily for 1-2 weeks then increase to 1 full tablet 30 tablet 0     fluticasone (FLONASE) 50 MCG/ACT spray Spray 1-2 sprays into both nostrils daily 1 Bottle 1     hydrochlorothiazide (HYDRODIURIL) 25 MG tablet Take 0.5 tablets (12.5 mg) by mouth daily 90 tablet 1     Allergies   Allergen Reactions     Latex      Shellfish Allergy      BP Readings from Last 3 Encounters:   10/30/18 126/74   10/15/18 (!) 150/103   06/28/18 (!) 170/96    Wt Readings from Last 3 " "Encounters:   10/30/18 185 lb (83.9 kg)   10/15/18 185 lb (83.9 kg)   06/28/18 190 lb (86.2 kg)                    Reviewed and updated as needed this visit by clinical staff       Reviewed and updated as needed this visit by Provider         ROS:  Remainder of ROS obtained and found to be negative other than that which was documented above      OBJECTIVE:     /74  Temp 99  F (37.2  C) (Tympanic)  Ht 5' 3\" (1.6 m)  Wt 185 lb (83.9 kg)  BMI 32.77 kg/m2  Body mass index is 32.77 kg/(m^2).  GENERAL: healthy, alert and no distress  RESP: lungs clear to auscultation - no rales, rhonchi or wheezes  CV: regular rates and rhythm, normal S1 S2, no S3 or S4 and no murmur, click or rub  PSYCH: mentation appears normal, affect normal, tearful but pleasant, engaged in conversation    Diagnostic Test Results:  none     ASSESSMENT/PLAN:     (F43.23) Adjustment disorder with mixed anxiety and depressed mood  (primary encounter diagnosis)  Comment: long discussion today about her current mood and validating her concerns. Also discussed treatment including counseling and medications. She is comfortable with trial of a medication understanding we will be starting low and moving up slow if needed. Follow up in 1 month  Plan: citalopram (CELEXA) 10 MG tablet            (I10) Hypertension goal BP (blood pressure) < 140/90  Comment: doing well. Great bp's. Continue current dose  Plan: hydrochlorothiazide (HYDRODIURIL) 25 MG tablet            (H81.11) Benign paroxysmal positional vertigo of right ear  Comment: refilled  Plan: fluticasone (FLONASE) 50 MCG/ACT spray            Follow up in 1 month, sooner if needed        Mervat Ulloa PA-C  [facility]    "

## 2018-11-16 ENCOUNTER — TELEPHONE (OUTPATIENT)
Dept: FAMILY MEDICINE | Facility: CLINIC | Age: 48
End: 2018-11-16

## 2018-11-16 DIAGNOSIS — F43.23 ADJUSTMENT DISORDER WITH MIXED ANXIETY AND DEPRESSED MOOD: ICD-10-CM

## 2018-11-16 RX ORDER — CITALOPRAM HYDROBROMIDE 10 MG/1
10 TABLET ORAL DAILY
Qty: 90 TABLET | Refills: 0 | Status: SHIPPED | OUTPATIENT
Start: 2018-11-16 | End: 2019-03-12

## 2018-11-16 NOTE — TELEPHONE ENCOUNTER
Rosemarie has gotten to the full tablet of the celexa.  She does see Mervat on 11/20/18, however but she will be out of medication before then.  Could you please review and write new script for full tablet.    celexa      Last Written Prescription Date:  10/30/18  Last Fill Quantity: 30,   # refills: 0  Last Office Visit: 10/30/18  Future Office visit:    Next 5 appointments (look out 90 days)     Nov 20, 2018  3:40 PM CST   MyChart Long with Mervat Ulloa PA-C   [facility]    [address] [address]   [phone]                   Routing refill request to provider for review/approval because:  Drug not on the [facility], [facility] or [facility] refill protocol or controlled substance

## 2018-11-20 ENCOUNTER — OFFICE VISIT (OUTPATIENT)
Dept: FAMILY MEDICINE | Facility: CLINIC | Age: 48
End: 2018-11-20
Payer: COMMERCIAL

## 2018-11-20 VITALS
WEIGHT: 185.6 LBS | TEMPERATURE: 98.2 F | RESPIRATION RATE: 16 BRPM | DIASTOLIC BLOOD PRESSURE: 86 MMHG | HEART RATE: 89 BPM | BODY MASS INDEX: 32.88 KG/M2 | OXYGEN SATURATION: 99 % | SYSTOLIC BLOOD PRESSURE: 132 MMHG

## 2018-11-20 DIAGNOSIS — J06.9 UPPER RESPIRATORY TRACT INFECTION, UNSPECIFIED TYPE: ICD-10-CM

## 2018-11-20 DIAGNOSIS — I10 HYPERTENSION GOAL BP (BLOOD PRESSURE) < 140/90: Primary | ICD-10-CM

## 2018-11-20 DIAGNOSIS — F43.23 ADJUSTMENT DISORDER WITH MIXED ANXIETY AND DEPRESSED MOOD: ICD-10-CM

## 2018-11-20 PROCEDURE — 99214 OFFICE O/P EST MOD 30 MIN: CPT | Performed by: PHYSICIAN ASSISTANT

## 2018-11-20 RX ORDER — HYDROCHLOROTHIAZIDE 25 MG/1
12.5 TABLET ORAL DAILY
Qty: 135 TABLET | Refills: 1 | Status: SHIPPED | OUTPATIENT
Start: 2018-11-20 | End: 2019-03-27

## 2018-11-20 ASSESSMENT — ANXIETY QUESTIONNAIRES
7. FEELING AFRAID AS IF SOMETHING AWFUL MIGHT HAPPEN: NOT AT ALL
2. NOT BEING ABLE TO STOP OR CONTROL WORRYING: NOT AT ALL
IF YOU CHECKED OFF ANY PROBLEMS ON THIS QUESTIONNAIRE, HOW DIFFICULT HAVE THESE PROBLEMS MADE IT FOR YOU TO DO YOUR WORK, TAKE CARE OF THINGS AT HOME, OR GET ALONG WITH OTHER PEOPLE: NOT DIFFICULT AT ALL
5. BEING SO RESTLESS THAT IT IS HARD TO SIT STILL: NOT AT ALL
3. WORRYING TOO MUCH ABOUT DIFFERENT THINGS: NOT AT ALL
1. FEELING NERVOUS, ANXIOUS, OR ON EDGE: NOT AT ALL
6. BECOMING EASILY ANNOYED OR IRRITABLE: NOT AT ALL
GAD7 TOTAL SCORE: 0

## 2018-11-20 ASSESSMENT — PATIENT HEALTH QUESTIONNAIRE - PHQ9
SUM OF ALL RESPONSES TO PHQ QUESTIONS 1-9: 2
5. POOR APPETITE OR OVEREATING: NOT AT ALL

## 2018-11-20 NOTE — MR AVS SNAPSHOT
After Visit Summary   11/20/2018    Rosemarie Michel    MRN: [id number]           Patient Information     Date Of Birth          1970        Visit Information        Provider Department      11/20/2018 3:40 PM Mervat Ulloa PA-C [facility]        Today's Diagnoses     Hypertension goal BP (blood pressure) < 140/90           Follow-ups after your visit        Who to contact     Normal or non-critical lab and imaging results will be communicated to you by Seesmichart, letter or phone within 4 business days after the clinic has received the results. If you do not hear from us within 7 days, please contact the clinic through Seesmichart or phone. If you have a critical or abnormal lab result, we will notify you by phone as soon as possible.  Submit refill requests through [email or web address] or call your pharmacy and they will forward the refill request to us. Please allow 3 business days for your refill to be completed.          If you need to speak with a  for additional information , please call: [phone]             Additional Information About Your Visit        [email or web address] Medical Information     [email or web address] gives you secure access to your electronic health record. If you see a primary care provider, you can also send messages to your care team and make appointments. If you have questions, please call your primary care clinic.  If you do not have a primary care provider, please call [phone] and they will assist you.        Care EveryWhere ID     This is your Care EveryWhere ID. This could be used by other organizations to access your [facility] medical records  [id number]        Your Vitals Were     Pulse Temperature Respirations Last Period Pulse Oximetry BMI (Body Mass Index)    89 98.2  F (36.8  C) (Tympanic) 16 11/17/2018 (Exact Date) 99% 32.88 kg/m2       Blood Pressure from Last 3 Encounters:   11/20/18 132/86   10/30/18 126/74   10/15/18 (!) 150/103    Weight from Last 3  Encounters:   11/20/18 185 lb 9.6 oz (84.2 kg)   10/30/18 185 lb (83.9 kg)   10/15/18 185 lb (83.9 kg)              We Performed the Following     DEPRESSION ACTION PLAN (DAP)          Where to get your medicines      These medications were sent to [facility], [place] - [address]  [address] [address] [facility] [address]     Phone:  [phone]     hydrochlorothiazide 25 MG tablet          Primary Care Provider Office Phone # Fax #    Mervat Ulloa PA-C [phone] [address] [address]        Equal Access to Services     OSCAR SCOTT : Quin yee Soblaire, waisaakda nehaladaha, qaybta kaalmada adegriceldayada, juan f mckeon. So [facility] [phone].    ATENCIÓN: Si habla español, tiene a holliday disposición servicios gratuitos de asistencia lingüística. Llame al [phone].    We comply with applicable federal civil rights laws and [place] laws. We do not discriminate on the basis of race, color, national origin, age, disability, sex, sexual orientation, or gender identity.            Thank you!     Thank you for choosing [facility]  for your care. Our goal is always to provide you with excellent care. Hearing back from our patients is one way we can continue to improve our services. Please take a few minutes to complete the written survey that you may receive in the mail after your visit with us. Thank you!             Your Updated Medication List - Protect others around you: Learn how to safely use, store and throw away your medicines at www.disposemymeds.org.          This list is accurate as of 11/20/18  4:10 PM.  Always use your most recent med list.                   Brand Name Dispense Instructions for use Diagnosis    citalopram 10 MG tablet    celeXA    90 tablet    Take 1 tablet (10 mg) by mouth daily    Adjustment disorder with mixed anxiety and depressed mood       fluticasone 50 MCG/ACT spray     FLONASE    1 Bottle    Spray 1-2 sprays into both nostrils daily    Benign paroxysmal positional vertigo of right ear       hydrochlorothiazide 25 MG tablet    HYDRODIURIL    135 tablet    Take 0.5 tablets (12.5 mg) by mouth daily    Hypertension goal BP (blood pressure) < 140/90

## 2018-11-20 NOTE — PROGRESS NOTES
"  SUBJECTIVE:   Rosemarie Michel is a 48 year old female who presents to clinic today for the following health issues:  {Provider please address medication reconciliation discrepancies--rooming staff please delete if no med/rec issues}    Depression and Anxiety Follow-Up- Celexa    Status since last visit: { :414405::\"No change\"}    Other associated symptoms:{ :694434::\"None\"}    Complicating factors:     Significant life event: { :259823::\"No\"}     Current substance abuse: { :000104::\"None\"}    PHQ 1/26/2016 2/14/2018   PHQ-9 Total Score 3 0   Q9: Suicide Ideation Not at all Not at all     DANII-7 SCORE 1/26/2016 2/14/2018   Total Score 0 4     {PROVIDER ONLY Complete follow-up questions for patients who report suicide ideation  (Optional):028283}  PHQ-9  English  PHQ-9   Any Language  DANII-7  Suicide Assessment Five-step Evaluation and Treatment (SAFE-T)    Amount of exercise or physical activity: {Exercise frequency days per week:350867}    Problems taking medications regularly: {Med Problems:036949::\"No\"}    Medication side effects: {CHRONIC MED SIDE EFFECTS:075767::\"none\"}    Diet: { :403627}        {additional problems for provider to add:139631}    Problem list and histories reviewed & adjusted, as indicated.  Additional history: {NONE - AS DOCUMENTED:879477::\"as documented\"}    {HIST REVIEW/ LINKS 2:502306}    Reviewed and updated as needed this visit by clinical staff       Reviewed and updated as needed this visit by Provider         {PROVIDER CHARTING PREFERENCE:878978}  "

## 2018-11-20 NOTE — PROGRESS NOTES
"  SUBJECTIVE:   Rosemarie Michel is a 48 year old female who presents to clinic today for the following health issues:    Hypertension Follow-up      Outpatient blood pressures are not being checked.     Low Salt Diet: low salt    Depression and Anxiety Follow-Up    Status since last visit: Improved with the Celexa    Other associated symptoms:None    Complicating factors:     Significant life event: No     Current substance abuse: None    PHQ 1/26/2016 2/14/2018   PHQ-9 Total Score 3 0   Q9: Suicide Ideation Not at all Not at all     DANII-7 SCORE 1/26/2016 2/14/2018   Total Score 0 4       PHQ-9  English  PHQ-9   Any Language  DANII-7  Suicide Assessment Five-step Evaluation and Treatment (SAFE-T)    Amount of exercise or physical activity: 1 day/week for an average of 30-45 minutes    Problems taking medications regularly: No    Medication side effects: none    Diet: regular (no restrictions)      Acute Illness   Acute illness concerns: cough, body aches  Onset: x 2 days    Fever: no    Chills/Sweats: YES- chills    Headache (location?): YES    Sinus Pressure:no    Conjunctivitis:  no    Ear Pain: no    Rhinorrhea: no    Congestion: no    Sore Throat: YES     Cough: YES    Wheeze: no    Decreased Appetite: no    Nausea: no    Vomiting: no    Diarrhea:  no    Dysuria/Freq.: no    Fatigue/Achiness: YES- aches    Sick/Strep Exposure: no. Co workers have been sick.     Therapies Tried and outcome: nothing        Problem list and histories reviewed & adjusted, as indicated.  Additional history:    Doing really well   Likes the celexa - feels like it has really \"mellowed\" her out  Not as worried or as easily agitated   Feels like she is enjoying the holiday season more     Tolerating bp med well - no concerns    Has some slight cold sx  Not worried too much about them  Her  and son were both sick recently    Current Outpatient Prescriptions   Medication Sig Dispense Refill     citalopram (CELEXA) 10 MG tablet Take " 1 tablet (10 mg) by mouth daily 90 tablet 0     hydrochlorothiazide (HYDRODIURIL) 25 MG tablet Take 0.5 tablets (12.5 mg) by mouth daily 90 tablet 1     fluticasone (FLONASE) 50 MCG/ACT spray Spray 1-2 sprays into both nostrils daily (Patient not taking: Reported on 11/20/2018) 1 Bottle 1     Allergies   Allergen Reactions     Latex      Shellfish Allergy      BP Readings from Last 3 Encounters:   11/20/18 132/86   10/30/18 126/74   10/15/18 (!) 150/103    Wt Readings from Last 3 Encounters:   11/20/18 185 lb 9.6 oz (84.2 kg)   10/30/18 185 lb (83.9 kg)   10/15/18 185 lb (83.9 kg)                    Reviewed and updated as needed this visit by clinical staff       Reviewed and updated as needed this visit by Provider         ROS:  Remainder of ROS obtained and found to be negative other than that which was documented above      OBJECTIVE:     /86  Pulse 89  Temp 98.2  F (36.8  C) (Tympanic)  Resp 16  Wt 185 lb 9.6 oz (84.2 kg)  LMP 11/17/2018 (Exact Date)  SpO2 99%  BMI 32.88 kg/m2  Body mass index is 32.88 kg/(m^2).  GENERAL: healthy, alert and no distress  EYES: Eyes grossly normal to inspection  HENT: ear canals and TM's normal, nose and mouth without ulcers or lesions  NECK: no adenopathy  RESP: lungs clear to auscultation - no rales, rhonchi or wheezes  CV: regular rates and rhythm, normal S1 S2, no S3 or S4 and no murmur, click or rub    Diagnostic Test Results:  none     ASSESSMENT/PLAN:     (I10) Hypertension goal BP (blood pressure) < 140/90  (primary encounter diagnosis)  Comment: well controlled at current dose. continue  Plan: hydrochlorothiazide (HYDRODIURIL) 25 MG tablet            (F43.23) Adjustment disorder with mixed anxiety and depressed mood  Comment: feeling much better on celexa  Plan: continue at current dose (10mg)    (J06.9) Upper respiratory tract infection, unspecified type  Comment: likely viral - patient feeling well   Plan: symptomatic management as needed            Mervat  BEN Ulloa PA-C  [facility]

## 2018-11-21 ASSESSMENT — ANXIETY QUESTIONNAIRES: GAD7 TOTAL SCORE: 0

## 2019-01-04 ENCOUNTER — TELEPHONE (OUTPATIENT)
Dept: FAMILY MEDICINE | Facility: CLINIC | Age: 49
End: 2019-01-04

## 2019-01-04 NOTE — TELEPHONE ENCOUNTER
Yes we can but I would like to set up a telephone visit to discuss if she can do that (Monday I have openings0  In the meantime she can take herself off the celexa. At the dose she is on (10mg) she could just stop it or if she feels that is too abrupt for her she could cut the pills in half  Mervat

## 2019-01-04 NOTE — TELEPHONE ENCOUNTER
Reason for Call:  Other prescription    Detailed comments: Pt is taking Citalopram, it is causing constipation. She has tried changing her diet and laxitives. Can she try something else? [facility]    Phone Number Patient can be reached at: Home number on file [phone] (home)    Best Time: any    Can we leave a detailed message on this number?     Call taken on 1/4/2019 at 9:15 AM by Susan Yu

## 2019-01-04 NOTE — TELEPHONE ENCOUNTER
"Call placed to patient.  Reports frustration with constipation since starting Citalopram.  She has tried Miralax daily, With some relief for 2 weeks.  Tried Fibercon daily with no relief.  High fiber diet and increased po fluids.  Increased activity over the last month.    Reports Citalopram was helping at first, now seems \"weak\".  Feels it does help her mood.  Was on Paxil before, felt that was \"too strong\".  Please advise.  Tracy Zarate RN     "

## 2019-01-04 NOTE — TELEPHONE ENCOUNTER
Call placed to patient.  Scheduled telephone visit Monday at 2pm.   Patient can be reached on cell phone.  Patient states, she stopped Citalopram 3days ago.  Tracy Zarate RN

## 2019-01-07 ENCOUNTER — VIRTUAL VISIT (OUTPATIENT)
Dept: FAMILY MEDICINE | Facility: CLINIC | Age: 49
End: 2019-01-07
Payer: COMMERCIAL

## 2019-01-07 DIAGNOSIS — K59.00 CONSTIPATION, UNSPECIFIED CONSTIPATION TYPE: ICD-10-CM

## 2019-01-07 DIAGNOSIS — F43.23 ADJUSTMENT DISORDER WITH MIXED ANXIETY AND DEPRESSED MOOD: Primary | ICD-10-CM

## 2019-01-07 PROCEDURE — 99442 ZZC PHYSICIAN TELEPHONE EVALUATION 11-20 MIN: CPT | Performed by: PHYSICIAN ASSISTANT

## 2019-01-08 ENCOUNTER — OFFICE VISIT (OUTPATIENT)
Dept: OBGYN | Facility: CLINIC | Age: 49
End: 2019-01-08
Payer: COMMERCIAL

## 2019-01-08 VITALS
HEIGHT: 64 IN | BODY MASS INDEX: 32.06 KG/M2 | SYSTOLIC BLOOD PRESSURE: 136 MMHG | WEIGHT: 187.8 LBS | DIASTOLIC BLOOD PRESSURE: 80 MMHG

## 2019-01-08 DIAGNOSIS — R68.82 DECREASED LIBIDO: ICD-10-CM

## 2019-01-08 DIAGNOSIS — L30.9 NIPPLE DERMATITIS: Primary | ICD-10-CM

## 2019-01-08 DIAGNOSIS — N92.4 EXCESSIVE BLEEDING IN PREMENOPAUSAL PERIOD: ICD-10-CM

## 2019-01-08 DIAGNOSIS — M25.559 PAIN IN JOINT INVOLVING PELVIC REGION AND THIGH, UNSPECIFIED LATERALITY: ICD-10-CM

## 2019-01-08 PROCEDURE — 99214 OFFICE O/P EST MOD 30 MIN: CPT | Performed by: OBSTETRICS & GYNECOLOGY

## 2019-01-08 RX ORDER — MOMETASONE FUROATE 1 MG/G
OINTMENT TOPICAL DAILY
Qty: 45 G | Refills: 1 | Status: SHIPPED | OUTPATIENT
Start: 2019-01-08 | End: 2019-02-19

## 2019-01-08 ASSESSMENT — MIFFLIN-ST. JEOR: SCORE: 1476.86

## 2019-01-08 NOTE — PROGRESS NOTES
SUBJECTIVE:                                                   Rosemarie Michel is a 48 year old female who presents to clinic today for the following health issue(s):  Patient presents with:  Breast Problem: rash on both breasts, on outside of areola, itches, redness. has been using benadryl to help. noticed symptoms about a month ago with some improvement      HPI:  Patient with multiple concerns today    1. Bilateral itchy rash on areola of nipples on both sides.  Using topical benadryl which helps itching but hasn't resolved.   No change in laundry products, new bras.   No nipple discharge , no breast pain or mass  Mom diagnosis with breast ca so patient anxious.  Mammo is due.   Hasn't tried topical steroid    2. Decreased libido for couple years. Good relationship, long term  Still has 8yo at home.  They never do date night or get a sitter.   Only had sex about 2-3 time in last 2 yrs.  Is on celexa which may be affecting her     3. Periods getting more irregular and heavier, some sharp vaginal pricking pains during periods, could be from endometriosis or referred pain from trapped pockets of blood in uterus from prior ablation.  We have discussed option of Laparoscopic supracervical hysterectomy in past but she has been reluctant.  No room for vaginal hysterectomy. One prior c section.     4. Having hot flashes and night sweats but is young for menopause.     Still smoking  Works for [employer]  History of prior c section  Failed endometrial ablation in 2016, still has heavy periods  Can't use ocp with smoking history which she still does sometimes and iud insertion not likely to be possible with prior ablation      Patient's last menstrual period was 2019..   Patient is sexually active, .  Using none for contraception.    reports that she has been smoking cigarettes.  She has a 5.25 pack-year smoking history. she has never used smokeless tobacco.  Tobacco Cessation Action Plan: Information  offered: Patient not interested at this time  STD testing offered?  Declined    Health maintenance updated:  yes    Today's PHQ-2 Score:   PHQ-2 ( 1999 Pfizer) 1/25/2017   Q1: Little interest or pleasure in doing things 0   Q2: Feeling down, depressed or hopeless 0   PHQ-2 Score 0     Today's PHQ-9 Score:   PHQ-9 SCORE 11/20/2018   PHQ-9 Total Score 2     Today's DANII-7 Score:   DANII-7 SCORE 11/20/2018   Total Score 0       Problem list and histories reviewed & adjusted, as indicated.  Additional history: as documented.    Patient Active Problem List   Diagnosis     Hyperlipidemia LDL goal <160     Excessive bleeding in premenopausal period     BPPV (benign paroxysmal positional vertigo)     Hypertension goal BP (blood pressure) < 140/90     Past Surgical History:   Procedure Laterality Date     AS HYSTEROSCOPY, SURGICAL; W/ ENDOMETRIAL ABLATION, ANY METHOD        Social History     Tobacco Use     Smoking status: Current Every Day Smoker     Packs/day: 0.25     Years: 21.00     Pack years: 5.25     Types: Cigarettes     Smokeless tobacco: Never Used     Tobacco comment: 6-7 cigs per day   Substance Use Topics     Alcohol use: Yes     Alcohol/week: 0.0 oz     Comment:  5 Beers Through Whole Pregnancy      Problem (# of Occurrences) Relation (Name,Age of Onset)    Breast Cancer (1) Mother    Cancer (1) Father (62): bladder    Diabetes (3) Mother (57): type 2, Father (60): type 2, Brother (42): type 2    Hyperlipidemia (3) Mother, Father, Paternal Grandmother            Current Outpatient Medications   Medication Sig     citalopram (CELEXA) 10 MG tablet Take 1 tablet (10 mg) by mouth daily     fluticasone (FLONASE) 50 MCG/ACT spray Spray 1-2 sprays into both nostrils daily     hydrochlorothiazide (HYDRODIURIL) 25 MG tablet Take 0.5 tablets (12.5 mg) by mouth daily     mometasone (ELOCON) 0.1 % external ointment Apply topically daily Twice a day to rash     No current facility-administered medications for this visit.   "    Allergies   Allergen Reactions     Latex      Shellfish Allergy        ROS:  12 point review of systems negative other than symptoms noted below.  Constitutional: Fatigue and Weight Gain  Gastrointestinal: Abdominal Pain and Bloating  Genitourinary: Cramps, Heavy Bleeding with Period, Hot Flashes, Night Sweats and Pelvic Pain  Skin: Rash  Musculoskeletal: Muscle Cramps  Endocrine: Decreased Libido    OBJECTIVE:     /80   Ht 1.626 m (5' 4\")   Wt 85.2 kg (187 lb 12.8 oz)   LMP 01/07/2019   BMI 32.24 kg/m    Body mass index is 32.24 kg/m .    Exam:  Constitutional:  Appearance: Well nourished, well developed alert, in no acute distress  Chest:  Respiratory Effort:  Breathing unlabored  Breasts:  Inspection of Breasts:  No lymphadenopathy present; Palpation of Breasts and Axillae:  No masses present on palpation, no breast tenderness Axillary Lymph Nodes:  No lymphadenopathy present  Neurologic/Psychiatric:  Mental Status:  Oriented X3      In-Clinic Test Results:  No results found for this or any previous visit (from the past 24 hour(s)).    ASSESSMENT/PLAN:                                                        ICD-10-CM    1. Nipple dermatitis L30.9 mometasone (ELOCON) 0.1 % external ointment       Having hot flashes but is on celexa.  Not sure if from perimenopause or the celexa.     Scaling on both nipples, will try bid steroid cream and recheck on month. If not resolved, refer to breast surgeon for poss biopsy, may need to r/o pagets.  Pagets isn't common.  Discussed today. Mammogram in the next month.     Initially I was thinking about iud trial but on review of older office notes, patient has had prior failed endometrial ablation which will cause adhesions.  IUD not practical in this situation.  Hysterectomy remains best option to deal with her heavy periods, pelvic pain    Her decreased libido and hot flashes may be related to perimenopause but she is a little young.  Celexa could cause similar " symptoms.     Plan to return 1 month for annual exam and mammogram and breast recheck.     Discussed need to start date night again, use a sitter at least once a month.   Discussed libido in women.     Face to face 30 minute, greater than 50% counceling.   l left voice mail for patient on her cell about the records review I did after she left about her prior ablation and issues with iud  Yolande Jackson MD  [facility] [place]

## 2019-02-19 ENCOUNTER — OFFICE VISIT (OUTPATIENT)
Dept: OBGYN | Facility: CLINIC | Age: 49
End: 2019-02-19
Payer: COMMERCIAL

## 2019-02-19 VITALS
HEIGHT: 64 IN | BODY MASS INDEX: 32.37 KG/M2 | DIASTOLIC BLOOD PRESSURE: 78 MMHG | WEIGHT: 189.6 LBS | SYSTOLIC BLOOD PRESSURE: 122 MMHG | HEART RATE: 84 BPM

## 2019-02-19 DIAGNOSIS — Z13.220 ENCOUNTER FOR LIPID SCREENING FOR CARDIOVASCULAR DISEASE: ICD-10-CM

## 2019-02-19 DIAGNOSIS — N93.8 DUB (DYSFUNCTIONAL UTERINE BLEEDING): ICD-10-CM

## 2019-02-19 DIAGNOSIS — Z13.228 SCREENING FOR METABOLIC DISORDER: ICD-10-CM

## 2019-02-19 DIAGNOSIS — Z01.419 ENCOUNTER FOR GYNECOLOGICAL EXAMINATION WITHOUT ABNORMAL FINDING: Primary | ICD-10-CM

## 2019-02-19 DIAGNOSIS — N92.1 MENORRHAGIA WITH IRREGULAR CYCLE: ICD-10-CM

## 2019-02-19 DIAGNOSIS — Z13.6 ENCOUNTER FOR LIPID SCREENING FOR CARDIOVASCULAR DISEASE: ICD-10-CM

## 2019-02-19 PROCEDURE — 36415 COLL VENOUS BLD VENIPUNCTURE: CPT | Performed by: OBSTETRICS & GYNECOLOGY

## 2019-02-19 PROCEDURE — 99214 OFFICE O/P EST MOD 30 MIN: CPT | Mod: 25 | Performed by: OBSTETRICS & GYNECOLOGY

## 2019-02-19 PROCEDURE — G0145 SCR C/V CYTO,THINLAYER,RESCR: HCPCS | Performed by: OBSTETRICS & GYNECOLOGY

## 2019-02-19 PROCEDURE — 80061 LIPID PANEL: CPT | Performed by: OBSTETRICS & GYNECOLOGY

## 2019-02-19 PROCEDURE — 87624 HPV HI-RISK TYP POOLED RSLT: CPT | Performed by: OBSTETRICS & GYNECOLOGY

## 2019-02-19 PROCEDURE — 99396 PREV VISIT EST AGE 40-64: CPT | Performed by: OBSTETRICS & GYNECOLOGY

## 2019-02-19 PROCEDURE — 80053 COMPREHEN METABOLIC PANEL: CPT | Performed by: OBSTETRICS & GYNECOLOGY

## 2019-02-19 ASSESSMENT — MIFFLIN-ST. JEOR: SCORE: 1485.02

## 2019-02-19 NOTE — PROGRESS NOTES
Rosemarie is a 48 year old  female who presents for annual exam.     Besides routine health maintenance, she would like to discuss hysterectomy surgery.    HPI:  The patient's PCP is Mervat Ulloa PA-C.    Patient had prior ablation and now has chronic pelvic pain and very heavy period have returned. Patient desires to go ahead with hysterectomy. The pain feels like pinching at the top of her vagina.  She bleeds irregularly and it is very heavy.   She is a smoker.        GYNECOLOGIC HISTORY:    Patient's last menstrual period was 2019 (exact date).  Her current contraception method is: condoms.  She  reports that she has been smoking cigarettes.  She has a 5.25 pack-year smoking history. she has never used smokeless tobacco.  Tobacco Cessation Action Plan: Self help information given to patient  Patient is sexually active.  STD testing offered?  Declined  Last PHQ-9 score on record =   PHQ-9 SCORE 2018   PHQ-9 Total Score 2     Last GAD7 score on record =   DANII-7 SCORE 2018   Total Score 0     Alcohol Score = 2    HEALTH MAINTENANCE:  Cholesterol: (  Cholesterol   Date Value Ref Range Status   2015 219 (A) 115 - 199 mg/dL Final    patient is fasting today for labs  Last Mammo: 18, Result: normal, Next Mammo: today   Pap: 17 neg, HPV-  Colonoscopy: NA, due at age 52  Dexa: Never    Health maintenance updated:  yes    HISTORY:  Obstetric History       T1      L1     SAB1   TAB0   Ectopic0   Multiple0   Live Births1       # Outcome Date GA Lbr Darvin/2nd Weight Sex Delivery Anes PTL Lv   2 Term 11 38w6d  3.232 kg (7 lb 2 oz) M CS-LTranv EPI N SHELLY      Name: Wes      Apgar1:  9                Apgar5: 9   1 SAB                   Patient Active Problem List   Diagnosis     Hyperlipidemia LDL goal <160     Excessive bleeding in premenopausal period     BPPV (benign paroxysmal positional vertigo)     Hypertension goal BP (blood pressure) < 140/90     Past  "Surgical History:   Procedure Laterality Date     AS HYSTEROSCOPY, SURGICAL; W/ ENDOMETRIAL ABLATION, ANY METHOD        Social History     Tobacco Use     Smoking status: Current Every Day Smoker     Packs/day: 0.25     Years: 21.00     Pack years: 5.25     Types: Cigarettes     Smokeless tobacco: Never Used     Tobacco comment: 5-6 cigs per day   Substance Use Topics     Alcohol use: Yes     Alcohol/week: 0.0 oz     Comment:  5 Beers Through Whole Pregnancy      Problem (# of Occurrences) Relation (Name,Age of Onset)    Breast Cancer (1) Mother    Cancer (1) Father (62): bladder    Diabetes (3) Mother (57): type 2, Father (60): type 2, Brother (42): type 2    Hyperlipidemia (3) Mother, Father, Paternal Grandmother            Current Outpatient Medications   Medication Sig     citalopram (CELEXA) 10 MG tablet Take 1 tablet (10 mg) by mouth daily     fluticasone (FLONASE) 50 MCG/ACT spray Spray 1-2 sprays into both nostrils daily     hydrochlorothiazide (HYDRODIURIL) 25 MG tablet Take 0.5 tablets (12.5 mg) by mouth daily     No current facility-administered medications for this visit.      Allergies   Allergen Reactions     Latex      Shellfish Allergy        Past medical, surgical, social and family histories were reviewed and updated in EPIC.    ROS:   12 point review of systems negative other than symptoms noted below.  Genitourinary: Heavy Bleeding with Period, Hot Flashes, Night Sweats and Pelvic Pain    EXAM:  /78   Pulse 84   Ht 1.626 m (5' 4\")   Wt 86 kg (189 lb 9.6 oz)   LMP 01/31/2019 (Exact Date)   BMI 32.54 kg/m     BMI: Body mass index is 32.54 kg/m .    PHYSICAL EXAM:  Constitutional:  Appearance: Well nourished, well developed, alert, in no acute distress  Neck:  Lymph Nodes:  No lymphadenopathy present    Thyroid:  Gland size normal, nontender, no nodules or masses present  on palpation  Chest:  Respiratory Effort:  Breathing unlabored  Cardiovascular:    Heart: Auscultation:  Regular rate, " normal rhythm, no murmurs present  Breasts: Inspection of Breasts:  No lymphadenopathy present., Palpation of Breasts and Axillae:  No masses present on palpation, no breast tenderness., Axillary Lymph Nodes:  No lymphadenopathy present. and No nodularity, asymmetry or nipple discharge bilaterally.  Gastrointestinal:   Abdominal Examination:  Abdomen nontender to palpation, tone normal without rigidity or guarding, no masses present, umbilicus without lesions   Liver and Spleen:  No hepatomegaly present, liver nontender to palpation    Hernias:  No hernias present  Lymphatic: Lymph Nodes:  No other lymphadenopathy present  Skin:  General Inspection:  No rashes present, no lesions present, no areas of  discoloration    Genitalia and Groin:  No rashes present, no lesions present, no areas of  discoloration, no masses present  Neurologic/Psychiatric:    Mental Status:  Oriented X3     Pelvic Exam:  External Genitalia:     Normal appearance for age, no discharge present, no tenderness present, no inflammatory lesions present, color normal  Vagina:     Normal vaginal vault without central or paravaginal defects, no discharge present, no inflammatory lesions present, no masses present  Bladder:     Nontender to palpation  Urethra:   Urethral Body:  Urethra palpation normal, urethra structural support normal   Urethral Meatus:  No erythema or lesions present  Cervix:     Appearance healthy, no lesions present, nontender to palpation, no bleeding present  Uterus:     Uterus: firm, normal sized and nontender, midplane in position.   Adnexa:     No adnexal tenderness present, no adnexal masses present  Perineum:     Perineum within normal limits, no evidence of trauma, no rashes or skin lesions present  Anus:     Anus within normal limits, no hemorrhoids present  Inguinal Lymph Nodes:     No lymphadenopathy present  Pubic Hair:     Normal pubic hair distribution for age  Genitalia and Groin:     No rashes present, no lesions  present, no areas of discoloration, no masses present      COUNSELING:   Reviewed preventive health counseling, as reflected in patient instructions       Regular exercise       Healthy diet/nutrition    BMI: Body mass index is 32.54 kg/m .  Needs to work on wt loss    ASSESSMENT:  48 year old female with satisfactory annual exam.    ICD-10-CM    1. Encounter for gynecological examination without abnormal finding Z01.419 Pap imaged thin layer screen with HPV - recommended age 30 - 65     HPV High Risk Types DNA Cervical   2. Encounter for lipid screening for cardiovascular disease Z13.220 Lipid panel reflex to direct LDL Fasting    Z13.6    3. Screening for metabolic disorder Z13.228 Comprehensive metabolic panel       PLAN:  Discussed hysterectomy  Will have her return next week for pelvic ultrasound and attempt emb which is likely not going to be adequate since she will have intrauterine adhesions from prior ablation.   Pap and hpv done today    She may have hematometra or endometriosis causing her pain. She could also have a pattl syndrome due to prior ablation.  We discussed her dysfunctional uterine bleeding and menorrhagia today.     Will discuss further when she returns next week. Discussed lash as option for definitive treatment.    Ultrasound to check for pelvic masses and emb to check for hyperplasia.     Will schedule after we get pap back.   Face to face 25 minute, greater than 50% counceling and coordinatation of care regarding her DYSFUNCTIONAL UTERINE BLEEDING and pelvic pain beyond her preventive services today    Yolande Jackson MD

## 2019-02-20 ENCOUNTER — TELEPHONE (OUTPATIENT)
Dept: OBGYN | Facility: CLINIC | Age: 49
End: 2019-02-20

## 2019-02-20 LAB
ALBUMIN SERPL-MCNC: 4.5 G/DL (ref 3.4–5)
ALP SERPL-CCNC: 65 U/L (ref 40–150)
ALT SERPL W P-5'-P-CCNC: 22 U/L (ref 0–50)
ANION GAP SERPL CALCULATED.3IONS-SCNC: 6 MMOL/L (ref 3–14)
AST SERPL W P-5'-P-CCNC: 14 U/L (ref 0–45)
BILIRUB SERPL-MCNC: 0.5 MG/DL (ref 0.2–1.3)
BUN SERPL-MCNC: 16 MG/DL (ref 7–30)
CALCIUM SERPL-MCNC: 9.3 MG/DL (ref 8.5–10.1)
CHLORIDE SERPL-SCNC: 102 MMOL/L (ref 94–109)
CHOLEST SERPL-MCNC: 248 MG/DL
CO2 SERPL-SCNC: 26 MMOL/L (ref 20–32)
CREAT SERPL-MCNC: 0.62 MG/DL (ref 0.52–1.04)
GFR SERPL CREATININE-BSD FRML MDRD: >90 ML/MIN/{1.73_M2}
GLUCOSE SERPL-MCNC: 86 MG/DL (ref 70–99)
HDLC SERPL-MCNC: 61 MG/DL
LDLC SERPL CALC-MCNC: 160 MG/DL
NONHDLC SERPL-MCNC: 187 MG/DL
POTASSIUM SERPL-SCNC: 4.3 MMOL/L (ref 3.4–5.3)
PROT SERPL-MCNC: 8 G/DL (ref 6.8–8.8)
SODIUM SERPL-SCNC: 134 MMOL/L (ref 133–144)
TRIGL SERPL-MCNC: 137 MG/DL

## 2019-02-21 LAB
COPATH REPORT: NORMAL
PAP: NORMAL

## 2019-02-22 LAB
FINAL DIAGNOSIS: NORMAL
HPV HR 12 DNA CVX QL NAA+PROBE: NEGATIVE
HPV16 DNA SPEC QL NAA+PROBE: NEGATIVE
HPV18 DNA SPEC QL NAA+PROBE: NEGATIVE
SPECIMEN DESCRIPTION: NORMAL
SPECIMEN SOURCE CVX/VAG CYTO: NORMAL

## 2019-03-06 ENCOUNTER — OFFICE VISIT (OUTPATIENT)
Dept: OBGYN | Facility: CLINIC | Age: 49
End: 2019-03-06
Attending: OBSTETRICS & GYNECOLOGY
Payer: COMMERCIAL

## 2019-03-06 ENCOUNTER — TELEPHONE (OUTPATIENT)
Dept: OBGYN | Facility: CLINIC | Age: 49
End: 2019-03-06

## 2019-03-06 ENCOUNTER — ANCILLARY PROCEDURE (OUTPATIENT)
Dept: ULTRASOUND IMAGING | Facility: CLINIC | Age: 49
End: 2019-03-06
Attending: OBSTETRICS & GYNECOLOGY
Payer: COMMERCIAL

## 2019-03-06 VITALS — HEIGHT: 64 IN | WEIGHT: 189 LBS | BODY MASS INDEX: 32.27 KG/M2

## 2019-03-06 DIAGNOSIS — N92.1 MENORRHAGIA WITH IRREGULAR CYCLE: ICD-10-CM

## 2019-03-06 DIAGNOSIS — N93.8 DUB (DYSFUNCTIONAL UTERINE BLEEDING): ICD-10-CM

## 2019-03-06 DIAGNOSIS — N93.8 DUB (DYSFUNCTIONAL UTERINE BLEEDING): Primary | ICD-10-CM

## 2019-03-06 LAB
ERYTHROCYTE [DISTWIDTH] IN BLOOD BY AUTOMATED COUNT: 12.8 % (ref 10–15)
HCT VFR BLD AUTO: 42.7 % (ref 35–47)
HGB BLD-MCNC: 14.5 G/DL (ref 11.7–15.7)
MCH RBC QN AUTO: 33.3 PG (ref 26.5–33)
MCHC RBC AUTO-ENTMCNC: 34 G/DL (ref 31.5–36.5)
MCV RBC AUTO: 98 FL (ref 78–100)
PLATELET # BLD AUTO: 226 10E9/L (ref 150–450)
RBC # BLD AUTO: 4.35 10E12/L (ref 3.8–5.2)
WBC # BLD AUTO: 9.8 10E9/L (ref 4–11)

## 2019-03-06 PROCEDURE — 99214 OFFICE O/P EST MOD 30 MIN: CPT | Performed by: OBSTETRICS & GYNECOLOGY

## 2019-03-06 PROCEDURE — 36415 COLL VENOUS BLD VENIPUNCTURE: CPT | Performed by: OBSTETRICS & GYNECOLOGY

## 2019-03-06 PROCEDURE — 85027 COMPLETE CBC AUTOMATED: CPT | Performed by: OBSTETRICS & GYNECOLOGY

## 2019-03-06 PROCEDURE — 88305 TISSUE EXAM BY PATHOLOGIST: CPT | Performed by: OBSTETRICS & GYNECOLOGY

## 2019-03-06 PROCEDURE — 76830 TRANSVAGINAL US NON-OB: CPT | Performed by: OBSTETRICS & GYNECOLOGY

## 2019-03-06 ASSESSMENT — MIFFLIN-ST. JEOR: SCORE: 1482.3

## 2019-03-06 NOTE — PROGRESS NOTES
SUBJECTIVE:                                                   Rosemarie Michel is a 48 year old female who presents to clinic today for the following health issue(s):  Patient presents with:  Ultrasound: follow-up dysfunctional uterine bleeding      HPI:  Patient has continued heavy irregular bleeding despite prior ablation  She desires hysterectomy now  She is a smoker    No LMP recorded, abnormal bleeding.  Patient is sexually active, .  Using condoms for contraception.    reports that she has been smoking cigarettes.  She has a 5.25 pack-year smoking history. she has never used smokeless tobacco.  Tobacco Cessation Action Plan: Self help information given to patient  Health maintenance updated:  yes    Today's PHQ-2 Score:   PHQ-2 (  [employer]) 2017   Q1: Little interest or pleasure in doing things 0   Q2: Feeling down, depressed or hopeless 0   PHQ-2 Score 0     Today's PHQ-9 Score:   PHQ-9 SCORE 2018   PHQ-9 Total Score 2     Today's DANII-7 Score:   DANII-7 SCORE 2018   Total Score 0       Problem list and histories reviewed & adjusted, as indicated.  Additional history: as documented.    Patient Active Problem List   Diagnosis     Hyperlipidemia LDL goal <160     Excessive bleeding in premenopausal period     BPPV (benign paroxysmal positional vertigo)     Hypertension goal BP (blood pressure) < 140/90     Past Surgical History:   Procedure Laterality Date     AS HYSTEROSCOPY, SURGICAL; W/ ENDOMETRIAL ABLATION, ANY METHOD        Social History     Tobacco Use     Smoking status: Current Every Day Smoker     Packs/day: 0.25     Years: 21.00     Pack years: 5.25     Types: Cigarettes     Smokeless tobacco: Never Used     Tobacco comment: 5-6 cigs per day   Substance Use Topics     Alcohol use: Yes     Alcohol/week: 0.0 oz     Comment:  5 Beers Through Whole Pregnancy      Problem (# of Occurrences) Relation (Name,Age of Onset)    Breast Cancer (1) Mother    Cancer (1) Father (62):  bladder    Diabetes (3) Mother (57): type 2, Father (60): type 2, Brother (42): type 2    Hyperlipidemia (3) Mother, Father, Paternal Grandmother            Current Outpatient Medications   Medication Sig     citalopram (CELEXA) 10 MG tablet Take 1 tablet (10 mg) by mouth daily     fluticasone (FLONASE) 50 MCG/ACT spray Spray 1-2 sprays into both nostrils daily     hydrochlorothiazide (HYDRODIURIL) 25 MG tablet Take 0.5 tablets (12.5 mg) by mouth daily     No current facility-administered medications for this visit.      Allergies   Allergen Reactions     Latex      Shellfish Allergy        ROS:  12 point review of systems negative other than symptoms noted below.    OBJECTIVE:     There were no vitals taken for this visit.  There is no height or weight on file to calculate BMI.    Exam:  Constitutional:  Appearance: Well nourished, well developed alert, in no acute distress  Chest:  Respiratory Effort:  Breathing unlabored  Cardiovascular: Heart: Auscultation:  Regular rate, normal rhythm, no murmurs present  Pelvic Exam:  External Genitalia:     Normal appearance for age, no discharge present, no tenderness present, no inflammatory lesions present, color normal  Vagina:     Normal vaginal vault without central or paravaginal defects, no discharge present, no inflammatory lesions present, no masses present  Bladder:     Nontender to palpation  Urethra:   Urethral Body:  Urethra palpation normal, urethra structural support normal   Urethral Meatus:  No erythema or lesions present  Cervix:     Appearance healthy, no lesions present, nontender to palpation, no bleeding present  Uterus:     Uterus: firm, normal sized and nontender, midplane in position.   Adnexa:     No adnexal tenderness present, no adnexal masses present  Perineum:     Perineum within normal limits, no evidence of trauma, no rashes or skin lesions present  Anus:     Anus within normal limits, no hemorrhoids present  Inguinal Lymph Nodes:     No  lymphadenopathy present  Pubic Hair:     Normal pubic hair distribution for age  Genitalia and Groin:     No rashes present, no lesions present, no areas of discoloration, no masses present       In-Clinic Test Results:  No results found for this or any previous visit (from the past 24 hour(s)).    ASSESSMENT/PLAN:                                                      menorraghia and dysfunctional uterine bleeding  emb done, path sent    We discussed options  Patient wants to have hysterectomy but keep ovaries    Works for [employer],   Some fatigue, check cbc    Discussed risks and benefits of surgery  Plan lash with bilateral salpingectomy, cysto, powered morcellation in bag  Anticipate 8 week recovery  25 minute face to face to discuss surgery, risks and benefits   Discussed powered morcellation and risks  Discussed bag containment  More than 50% counceling    Check for anemia and path from emb    Yolande Jackson MD  [facility] [place]    INDICATIONS:                                                      Having endometrial biopsy for dysfunctional uterine bleeding    Is a pregnancy test required: No.  Was a consent obtained?  No      PROCEDURE;                                                      A speculum was placed in the vagina and cervix prepped with betadine. A tenaculum was attached to the cervix. A small plastic 5 mm Pipelle syringe curette was inserted into the cervical canal. The uterus was sounded to 8 cm's. A vigorous four quadrant biopsy was performed, removing amount scant  of tissue. The speculum was removed. This tissue was placed in Formalin and sent to pathology.    The patient tolerated the procedure  well and she reported there was  cramping.      POST PROCEDURE;                                                      There  was no cramping at the time of discharge. She  tolerated the procedure well. There were no complications. Patient was discharged in stable condition.    Patient  advised to call the clinic if severe pelvic pain, fever or heavy bleeding.    Yolande Jackson MD

## 2019-03-06 NOTE — TELEPHONE ENCOUNTER
Order Questions     Question Answer Comment   Procedure name(s) - multi select lash with bilateral salpingectomy, cysto, powered morcellation in bag    Reason for procedure menorrhagia    Surgeon: brooke    Is this a multi surgeon case? No    Laterality Bilateral    Request for additional equipment Other (see comments) None   Anesthesia General    Initiate Pre-op orders for above procedure: Yes, as ordered in Epic Additional orders noted there also   Location of Case: [facility]    PA Assistant: No    Surgical Assistant Francois    Operating room  requested: No    Urgency of Surgery: Routine    Surgeon Procedure Time (incision to closure) in minutes (per procedure as applicable) 120    Note:  Surgical Case Time Needed (in minutes)   Patient Class (for admit prior to surgery, specify number of days in comments): Same day (hospital outpatient)    Why can t this outpatient surgery be done at the [facility] or  ASC? na    Vendor Needed? Yes    Specify vendor: for bag

## 2019-03-07 NOTE — TELEPHONE ENCOUNTER
Type of surgery: LENIN BS POWERED MORCELLATION IN A BAG DIAG CYSTO  Location of surgery: [facility]  Date and time of surgery: 4/18/2019 9am ARRIVAL 7am  Surgeon: Manuel Villaseñor to Assist  Pre-Op Appt Date: HOSPITAL  Post-Op Appt Date: TBD   Packet sent out: MAILED 3/7/2019  Pre-cert/Authorization completed:  TBD  Date: 3/7/2019 Bernie montenegro/Taisha Beckman  Surgery Scheduler

## 2019-03-08 LAB — COPATH REPORT: NORMAL

## 2019-03-11 ENCOUNTER — TELEPHONE (OUTPATIENT)
Dept: OBGYN | Facility: CLINIC | Age: 49
End: 2019-03-11

## 2019-03-12 DIAGNOSIS — F43.23 ADJUSTMENT DISORDER WITH MIXED ANXIETY AND DEPRESSED MOOD: ICD-10-CM

## 2019-03-12 NOTE — TELEPHONE ENCOUNTER
"Requested Prescriptions   Pending Prescriptions Disp Refills     citalopram (CELEXA) 10 MG tablet [Pharmacy Med Name: CITALOPRAM 10MG TABLETS]  Last Written Prescription Date:  11/16/18  Last Fill Quantity: 90,  # refills: 0   Last office visit: 1/7/2019 with prescribing provider:  Mervat Ulloa   Future Office Visit:     90 tablet 0     Sig: TAKE 1 TABLET(10 MG) BY MOUTH DAILY    SSRIs Protocol Passed - 3/12/2019  7:23 AM       Passed - Recent (12 mo) or future (30 days) visit within the authorizing provider's specialty    Patient had office visit in the last 12 months or has a visit in the next 30 days with authorizing provider or within the authorizing provider's specialty.  See \"Patient Info\" tab in inbasket, or \"Choose Columns\" in Meds & Orders section of the refill encounter.         Passed - Medication is active on med list       Passed - Patient is age 18 or older       Passed - No active pregnancy on record       Passed - No positive pregnancy test in last 12 months          "

## 2019-03-14 RX ORDER — CITALOPRAM HYDROBROMIDE 10 MG/1
TABLET ORAL
Qty: 90 TABLET | Refills: 0 | Status: SHIPPED | OUTPATIENT
Start: 2019-03-14 | End: 2019-06-14

## 2019-03-14 NOTE — TELEPHONE ENCOUNTER
Prescription approved per [facility] Refill Protocol.    PHQ-9 SCORE 1/26/2016 2/14/2018 11/20/2018   PHQ-9 Total Score 3 0 2     Johanne PALMA RN

## 2019-03-20 ENCOUNTER — TELEPHONE (OUTPATIENT)
Dept: OBGYN | Facility: CLINIC | Age: 49
End: 2019-03-20

## 2019-03-20 NOTE — TELEPHONE ENCOUNTER
Pt looking for short term disability forms from [employer] that were faxed to our clinic  Pt would like to be off 9 wks-April 16-June 18    Contact info:  Rene Valladares [phone]  Ext [phone]    [email or web address]    Pt's intake # [id number]  Will see if Dr. Jackson has forms  Sierra Flores RN on 3/20/2019 at 10:30 AM      Unable to find forms-called [facility] to request they be refaxed to the clinic  Sierra Flores RN on 3/20/2019 at 11:48 AM

## 2019-03-20 NOTE — TELEPHONE ENCOUNTER
Patient calling about STD/leave time, she wants to update the form from 8 weeks off to 9 weeks off.

## 2019-03-21 NOTE — TELEPHONE ENCOUNTER
Informed pt forms have been received for her short term disability-  Put in Dr. Jackson's inbox with a note on them with the dates the pt wants off (9 wks April 16-June 18  Sierra Flores RN on 3/21/2019 at 9:32 AM

## 2019-03-22 ENCOUNTER — ANCILLARY PROCEDURE (OUTPATIENT)
Dept: MAMMOGRAPHY | Facility: CLINIC | Age: 49
End: 2019-03-22
Payer: COMMERCIAL

## 2019-03-22 DIAGNOSIS — Z12.31 VISIT FOR SCREENING MAMMOGRAM: ICD-10-CM

## 2019-03-22 PROCEDURE — 77063 BREAST TOMOSYNTHESIS BI: CPT | Mod: TC

## 2019-03-22 PROCEDURE — 77067 SCR MAMMO BI INCL CAD: CPT | Mod: TC

## 2019-03-27 ENCOUNTER — MYC REFILL (OUTPATIENT)
Dept: FAMILY MEDICINE | Facility: CLINIC | Age: 49
End: 2019-03-27

## 2019-03-27 DIAGNOSIS — I10 HYPERTENSION GOAL BP (BLOOD PRESSURE) < 140/90: ICD-10-CM

## 2019-03-27 DIAGNOSIS — F43.23 ADJUSTMENT DISORDER WITH MIXED ANXIETY AND DEPRESSED MOOD: ICD-10-CM

## 2019-03-27 RX ORDER — HYDROCHLOROTHIAZIDE 25 MG/1
12.5 TABLET ORAL DAILY
Qty: 135 TABLET | Refills: 1 | Status: SHIPPED | OUTPATIENT
Start: 2019-03-27 | End: 2019-06-14

## 2019-03-27 RX ORDER — CITALOPRAM HYDROBROMIDE 10 MG/1
10 TABLET ORAL DAILY
Qty: 90 TABLET | Refills: 0 | Status: CANCELLED | OUTPATIENT
Start: 2019-03-27

## 2019-03-28 ENCOUNTER — TRANSFERRED RECORDS (OUTPATIENT)
Dept: HEALTH INFORMATION MANAGEMENT | Facility: CLINIC | Age: 49
End: 2019-03-28

## 2019-04-03 ENCOUNTER — VIRTUAL VISIT (OUTPATIENT)
Dept: FAMILY MEDICINE | Facility: CLINIC | Age: 49
End: 2019-04-03
Payer: COMMERCIAL

## 2019-04-03 DIAGNOSIS — M54.2 NECK PAIN: Primary | ICD-10-CM

## 2019-04-03 PROCEDURE — 99442 ZZC PHYSICIAN TELEPHONE EVALUATION 11-20 MIN: CPT | Performed by: PHYSICIAN ASSISTANT

## 2019-04-03 NOTE — PROGRESS NOTES
"Rosemarie Michel is a 49 year old female who is being evaluated via a telephone visit.      The patient has been notified of following (by provider) Mervat Ulloa PA-C      \"We have found that certain health care needs can be provided without the need for a physical exam.  This service lets us provide the care you need with a short phone conversation.  If a prescription is necessary we can send it directly to your pharmacy.  If lab work is needed we can place an order for that and you can then stop by our lab to have the test done at a later time.    This telephone visit will be conducted via 3 way call with the you (the patient) , the physician/provider, and a me all on the line at the same time.  This allows your physician/provider to have the phone conversation with you while I will be taking notes for your medical record.  We will have full access to your [facility] medical record during this entire phone call.    Since this is like an office visit,  will bill your insurance company for this service.  Please check with your medical insurance if this type of telephone/virtual is covered . You may be responsible for the cost of this service if insurance coverage is denied.  The typical cost is $30 (10min), $59(11-20min) and $85 (21-30min)     If during the course of the call the physician/provider feels a telephone visit is not appropriate, you will not be charged for this service\"    Consent has been obtained for this service by care team member: yes.  See the scanned image in the medical record.        Pertinent parts of the the patient's medical history reviewed and confirmed by the provider included : medications     Total time of call between patient and provider was 14 minutes     Mervat Ulloa PA-C    ===================================================    I have reviewed the note as documented above.  This accurately captures the substance of my conversation with the patient,    Additional provider " "notes:    Has had issues for awhile  Has had multiple steroid shots in the past - done through [facility] in [place]  Has another on file that she could use \"if needed\" but doesn't want to use it. Last time she had a steroid injection her blood pressure shot \"jay high.\" Had to stay in the office for an additional hour. Almost had her  take her to the ED  Got home and felt like her heart was racing, was going to pass out, felt \"off\" Eventually subsided and then was fine  They did help    Chiropractor wants her to see a surgeon - gave her 2 names and she trusts him   Has been to [facility] for PT and has worked really well  Stopped going because she was doing well but stopped doing the exercises at home    Was doing well until she shoveled the surgery in Feb    Would like to try oral steroids vs injection  Pain has gotten better, just the numbness and tingling that she notices especially when being in certain positions for awhile    Having a hysterectomy on April 18th    Reviewed MRI from outside clinic      Assessment/Plan:    ICD-10-CM    1. Neck pain M54.2            Patient reluctant to try another injection given last experience although admits it did help. Also hesitant to see a surgeon although we discussed that a consult at the very least to discuss her options would be helpful. Overall pain has improved. Still notes some tingling that comes and goes depending on the position. Would like to avoid steroids at this time given she will be going in for surgery in less than 2 weeks and don't want to interfere with the healing post surgery which she understands.   We discussed red flags for which she should be evaluated sooner.     Mervat Ulloa PA-C          "

## 2019-04-08 RX ORDER — IBUPROFEN 200 MG
200-600 TABLET ORAL EVERY 4 HOURS PRN
COMMUNITY
End: 2023-12-19

## 2019-04-08 RX ORDER — MULTIPLE VITAMINS W/ MINERALS TAB 9MG-400MCG
1 TAB ORAL DAILY
COMMUNITY
End: 2019-07-24

## 2019-04-08 RX ORDER — ACETAMINOPHEN 500 MG
500-1000 TABLET ORAL EVERY 6 HOURS PRN
COMMUNITY
End: 2023-12-19

## 2019-04-08 RX ORDER — DIAPER,BRIEF,INFANT-TODD,DISP
EACH MISCELLANEOUS
COMMUNITY
End: 2019-07-24

## 2019-04-17 ENCOUNTER — TELEPHONE (OUTPATIENT)
Dept: OBGYN | Facility: CLINIC | Age: 49
End: 2019-04-17

## 2019-04-17 NOTE — TELEPHONE ENCOUNTER
Pt calling to confirm that she should take her BP med in the early am before her surgery.  Instructed for her to take. The [employer] will be asking her about her meds in the am.

## 2019-04-18 ENCOUNTER — HOSPITAL ENCOUNTER (OUTPATIENT)
Facility: CLINIC | Age: 49
Discharge: HOME | End: 2019-04-18
Attending: OBSTETRICS & GYNECOLOGY | Admitting: OBSTETRICS & GYNECOLOGY
Payer: COMMERCIAL

## 2019-04-18 ENCOUNTER — ANESTHESIA (OUTPATIENT)
Dept: SURGERY | Facility: CLINIC | Age: 49
End: 2019-04-18
Payer: COMMERCIAL

## 2019-04-18 ENCOUNTER — ANESTHESIA EVENT (OUTPATIENT)
Dept: SURGERY | Facility: CLINIC | Age: 49
End: 2019-04-18
Payer: COMMERCIAL

## 2019-04-18 VITALS
HEIGHT: 64 IN | RESPIRATION RATE: 16 BRPM | SYSTOLIC BLOOD PRESSURE: 114 MMHG | TEMPERATURE: 97.9 F | HEART RATE: 53 BPM | OXYGEN SATURATION: 95 % | BODY MASS INDEX: 32.1 KG/M2 | DIASTOLIC BLOOD PRESSURE: 71 MMHG | WEIGHT: 188 LBS

## 2019-04-18 DIAGNOSIS — G89.18 ACUTE POST-OPERATIVE PAIN: Primary | ICD-10-CM

## 2019-04-18 LAB — B-HCG SERPL-ACNC: <1 IU/L (ref 0–5)

## 2019-04-18 PROCEDURE — 25000128 H RX IP 250 OP 636: Performed by: NURSE ANESTHETIST, CERTIFIED REGISTERED

## 2019-04-18 PROCEDURE — 25800030 ZZH RX IP 258 OP 636: Performed by: OBSTETRICS & GYNECOLOGY

## 2019-04-18 PROCEDURE — 58542 LSH W/T/O UT 250 G OR LESS: CPT | Mod: 80 | Performed by: OBSTETRICS & GYNECOLOGY

## 2019-04-18 PROCEDURE — 25000128 H RX IP 250 OP 636: Performed by: ANESTHESIOLOGY

## 2019-04-18 PROCEDURE — 25000125 ZZHC RX 250: Performed by: NURSE ANESTHETIST, CERTIFIED REGISTERED

## 2019-04-18 PROCEDURE — 25000125 ZZHC RX 250: Performed by: OBSTETRICS & GYNECOLOGY

## 2019-04-18 PROCEDURE — 36000093 ZZH SURGERY LEVEL 4 1ST 30 MIN: Performed by: OBSTETRICS & GYNECOLOGY

## 2019-04-18 PROCEDURE — 40000170 ZZH STATISTIC PRE-PROCEDURE ASSESSMENT II: Performed by: OBSTETRICS & GYNECOLOGY

## 2019-04-18 PROCEDURE — 37000009 ZZH ANESTHESIA TECHNICAL FEE, EACH ADDTL 15 MIN: Performed by: OBSTETRICS & GYNECOLOGY

## 2019-04-18 PROCEDURE — 25000128 H RX IP 250 OP 636: Performed by: OBSTETRICS & GYNECOLOGY

## 2019-04-18 PROCEDURE — 58542 LSH W/T/O UT 250 G OR LESS: CPT | Performed by: OBSTETRICS & GYNECOLOGY

## 2019-04-18 PROCEDURE — 71000027 ZZH RECOVERY PHASE 2 EACH 15 MINS: Performed by: OBSTETRICS & GYNECOLOGY

## 2019-04-18 PROCEDURE — 25000125 ZZHC RX 250: Performed by: ANESTHESIOLOGY

## 2019-04-18 PROCEDURE — 27210794 ZZH OR GENERAL SUPPLY STERILE: Performed by: OBSTETRICS & GYNECOLOGY

## 2019-04-18 PROCEDURE — 84702 CHORIONIC GONADOTROPIN TEST: CPT | Performed by: OBSTETRICS & GYNECOLOGY

## 2019-04-18 PROCEDURE — 25000132 ZZH RX MED GY IP 250 OP 250 PS 637: Performed by: OBSTETRICS & GYNECOLOGY

## 2019-04-18 PROCEDURE — 71000012 ZZH RECOVERY PHASE 1 LEVEL 1 FIRST HR: Performed by: OBSTETRICS & GYNECOLOGY

## 2019-04-18 PROCEDURE — 88307 TISSUE EXAM BY PATHOLOGIST: CPT | Performed by: OBSTETRICS & GYNECOLOGY

## 2019-04-18 PROCEDURE — 36415 COLL VENOUS BLD VENIPUNCTURE: CPT | Performed by: OBSTETRICS & GYNECOLOGY

## 2019-04-18 PROCEDURE — 25800025 ZZH RX 258: Performed by: OBSTETRICS & GYNECOLOGY

## 2019-04-18 PROCEDURE — 36000063 ZZH SURGERY LEVEL 4 EA 15 ADDTL MIN: Performed by: OBSTETRICS & GYNECOLOGY

## 2019-04-18 PROCEDURE — 37000008 ZZH ANESTHESIA TECHNICAL FEE, 1ST 30 MIN: Performed by: OBSTETRICS & GYNECOLOGY

## 2019-04-18 PROCEDURE — 25800030 ZZH RX IP 258 OP 636: Performed by: NURSE ANESTHETIST, CERTIFIED REGISTERED

## 2019-04-18 PROCEDURE — 71000013 ZZH RECOVERY PHASE 1 LEVEL 1 EA ADDTL HR: Performed by: OBSTETRICS & GYNECOLOGY

## 2019-04-18 PROCEDURE — 25000566 ZZH SEVOFLURANE, EA 15 MIN: Performed by: OBSTETRICS & GYNECOLOGY

## 2019-04-18 PROCEDURE — 25000132 ZZH RX MED GY IP 250 OP 250 PS 637: Performed by: ANESTHESIOLOGY

## 2019-04-18 RX ORDER — CEFAZOLIN SODIUM 1 G/3ML
1 INJECTION, POWDER, FOR SOLUTION INTRAMUSCULAR; INTRAVENOUS SEE ADMIN INSTRUCTIONS
Status: DISCONTINUED | OUTPATIENT
Start: 2019-04-18 | End: 2019-04-18 | Stop reason: HOSPADM

## 2019-04-18 RX ORDER — NEOSTIGMINE METHYLSULFATE 1 MG/ML
VIAL (ML) INJECTION PRN
Status: DISCONTINUED | OUTPATIENT
Start: 2019-04-18 | End: 2019-04-18

## 2019-04-18 RX ORDER — OXYCODONE HYDROCHLORIDE 5 MG/1
5 TABLET ORAL
Status: COMPLETED | OUTPATIENT
Start: 2019-04-18 | End: 2019-04-18

## 2019-04-18 RX ORDER — OXYCODONE HYDROCHLORIDE 5 MG/1
5 TABLET ORAL EVERY 4 HOURS PRN
Qty: 20 TABLET | Refills: 0 | Status: SHIPPED | OUTPATIENT
Start: 2019-04-18 | End: 2019-05-01

## 2019-04-18 RX ORDER — SODIUM CHLORIDE, SODIUM LACTATE, POTASSIUM CHLORIDE, CALCIUM CHLORIDE 600; 310; 30; 20 MG/100ML; MG/100ML; MG/100ML; MG/100ML
INJECTION, SOLUTION INTRAVENOUS CONTINUOUS
Status: DISCONTINUED | OUTPATIENT
Start: 2019-04-18 | End: 2019-04-18 | Stop reason: HOSPADM

## 2019-04-18 RX ORDER — HYDROMORPHONE HYDROCHLORIDE 1 MG/ML
.3-.5 INJECTION, SOLUTION INTRAMUSCULAR; INTRAVENOUS; SUBCUTANEOUS EVERY 10 MIN PRN
Status: DISCONTINUED | OUTPATIENT
Start: 2019-04-18 | End: 2019-04-18 | Stop reason: HOSPADM

## 2019-04-18 RX ORDER — KETOROLAC TROMETHAMINE 30 MG/ML
INJECTION, SOLUTION INTRAMUSCULAR; INTRAVENOUS PRN
Status: DISCONTINUED | OUTPATIENT
Start: 2019-04-18 | End: 2019-04-18

## 2019-04-18 RX ORDER — ONDANSETRON 2 MG/ML
4 INJECTION INTRAMUSCULAR; INTRAVENOUS EVERY 30 MIN PRN
Status: DISCONTINUED | OUTPATIENT
Start: 2019-04-18 | End: 2019-04-18 | Stop reason: HOSPADM

## 2019-04-18 RX ORDER — PROPOFOL 10 MG/ML
INJECTION, EMULSION INTRAVENOUS CONTINUOUS PRN
Status: DISCONTINUED | OUTPATIENT
Start: 2019-04-18 | End: 2019-04-18

## 2019-04-18 RX ORDER — VASOPRESSIN 20 U/ML
INJECTION PARENTERAL
Status: DISCONTINUED
Start: 2019-04-18 | End: 2019-04-18 | Stop reason: HOSPADM

## 2019-04-18 RX ORDER — SCOLOPAMINE TRANSDERMAL SYSTEM 1 MG/1
1 PATCH, EXTENDED RELEASE TRANSDERMAL ONCE
Status: COMPLETED | OUTPATIENT
Start: 2019-04-18 | End: 2019-04-18

## 2019-04-18 RX ORDER — DEXAMETHASONE SODIUM PHOSPHATE 4 MG/ML
INJECTION, SOLUTION INTRA-ARTICULAR; INTRALESIONAL; INTRAMUSCULAR; INTRAVENOUS; SOFT TISSUE PRN
Status: DISCONTINUED | OUTPATIENT
Start: 2019-04-18 | End: 2019-04-18

## 2019-04-18 RX ORDER — FENTANYL CITRATE 50 UG/ML
25-50 INJECTION, SOLUTION INTRAMUSCULAR; INTRAVENOUS EVERY 5 MIN PRN
Status: DISCONTINUED | OUTPATIENT
Start: 2019-04-18 | End: 2019-04-18 | Stop reason: HOSPADM

## 2019-04-18 RX ORDER — BUPIVACAINE HYDROCHLORIDE AND EPINEPHRINE 2.5; 5 MG/ML; UG/ML
INJECTION, SOLUTION EPIDURAL; INFILTRATION; INTRACAUDAL; PERINEURAL
Status: DISCONTINUED
Start: 2019-04-18 | End: 2019-04-18 | Stop reason: HOSPADM

## 2019-04-18 RX ORDER — GLYCOPYRROLATE 0.2 MG/ML
INJECTION, SOLUTION INTRAMUSCULAR; INTRAVENOUS PRN
Status: DISCONTINUED | OUTPATIENT
Start: 2019-04-18 | End: 2019-04-18

## 2019-04-18 RX ORDER — ACETAMINOPHEN 325 MG/1
975 TABLET ORAL ONCE
Status: COMPLETED | OUTPATIENT
Start: 2019-04-18 | End: 2019-04-18

## 2019-04-18 RX ORDER — OXYCODONE HYDROCHLORIDE 5 MG/1
5 TABLET ORAL ONCE
Status: DISCONTINUED | OUTPATIENT
Start: 2019-04-18 | End: 2019-04-18 | Stop reason: HOSPADM

## 2019-04-18 RX ORDER — PROPOFOL 10 MG/ML
INJECTION, EMULSION INTRAVENOUS PRN
Status: DISCONTINUED | OUTPATIENT
Start: 2019-04-18 | End: 2019-04-18

## 2019-04-18 RX ORDER — NALOXONE HYDROCHLORIDE 0.4 MG/ML
.1-.4 INJECTION, SOLUTION INTRAMUSCULAR; INTRAVENOUS; SUBCUTANEOUS
Status: DISCONTINUED | OUTPATIENT
Start: 2019-04-18 | End: 2019-04-18 | Stop reason: HOSPADM

## 2019-04-18 RX ORDER — LIDOCAINE HYDROCHLORIDE 20 MG/ML
INJECTION, SOLUTION INFILTRATION; PERINEURAL PRN
Status: DISCONTINUED | OUTPATIENT
Start: 2019-04-18 | End: 2019-04-18

## 2019-04-18 RX ORDER — ONDANSETRON 4 MG/1
4 TABLET, ORALLY DISINTEGRATING ORAL EVERY 30 MIN PRN
Status: DISCONTINUED | OUTPATIENT
Start: 2019-04-18 | End: 2019-04-18 | Stop reason: HOSPADM

## 2019-04-18 RX ORDER — MAGNESIUM HYDROXIDE 1200 MG/15ML
LIQUID ORAL PRN
Status: DISCONTINUED | OUTPATIENT
Start: 2019-04-18 | End: 2019-04-18 | Stop reason: HOSPADM

## 2019-04-18 RX ORDER — ONDANSETRON 2 MG/ML
INJECTION INTRAMUSCULAR; INTRAVENOUS PRN
Status: DISCONTINUED | OUTPATIENT
Start: 2019-04-18 | End: 2019-04-18

## 2019-04-18 RX ORDER — CEFAZOLIN SODIUM 2 G/100ML
2 INJECTION, SOLUTION INTRAVENOUS
Status: COMPLETED | OUTPATIENT
Start: 2019-04-18 | End: 2019-04-18

## 2019-04-18 RX ORDER — BUPIVACAINE HYDROCHLORIDE AND EPINEPHRINE 5; 5 MG/ML; UG/ML
INJECTION, SOLUTION EPIDURAL; INTRACAUDAL; PERINEURAL
Status: DISCONTINUED
Start: 2019-04-18 | End: 2019-04-18 | Stop reason: HOSPADM

## 2019-04-18 RX ORDER — FENTANYL CITRATE 50 UG/ML
INJECTION, SOLUTION INTRAMUSCULAR; INTRAVENOUS PRN
Status: DISCONTINUED | OUTPATIENT
Start: 2019-04-18 | End: 2019-04-18

## 2019-04-18 RX ORDER — PHENAZOPYRIDINE HYDROCHLORIDE 200 MG/1
200 TABLET, FILM COATED ORAL ONCE
Status: COMPLETED | OUTPATIENT
Start: 2019-04-18 | End: 2019-04-18

## 2019-04-18 RX ORDER — SODIUM CHLORIDE, SODIUM LACTATE, POTASSIUM CHLORIDE, CALCIUM CHLORIDE 600; 310; 30; 20 MG/100ML; MG/100ML; MG/100ML; MG/100ML
INJECTION, SOLUTION INTRAVENOUS CONTINUOUS PRN
Status: DISCONTINUED | OUTPATIENT
Start: 2019-04-18 | End: 2019-04-18

## 2019-04-18 RX ADMIN — PROPOFOL 150 MCG/KG/MIN: 10 INJECTION, EMULSION INTRAVENOUS at 09:14

## 2019-04-18 RX ADMIN — ROCURONIUM BROMIDE 50 MG: 10 INJECTION INTRAVENOUS at 09:14

## 2019-04-18 RX ADMIN — ROCURONIUM BROMIDE 5 MG: 10 INJECTION INTRAVENOUS at 10:07

## 2019-04-18 RX ADMIN — ROCURONIUM BROMIDE 5 MG: 10 INJECTION INTRAVENOUS at 09:30

## 2019-04-18 RX ADMIN — DEXMEDETOMIDINE HYDROCHLORIDE 8 MCG: 100 INJECTION, SOLUTION INTRAVENOUS at 09:35

## 2019-04-18 RX ADMIN — SCOPALAMINE 1 PATCH: 1 PATCH, EXTENDED RELEASE TRANSDERMAL at 08:20

## 2019-04-18 RX ADMIN — ROCURONIUM BROMIDE 5 MG: 10 INJECTION INTRAVENOUS at 10:00

## 2019-04-18 RX ADMIN — HYDROMORPHONE HYDROCHLORIDE 0.5 MG: 1 INJECTION, SOLUTION INTRAMUSCULAR; INTRAVENOUS; SUBCUTANEOUS at 10:13

## 2019-04-18 RX ADMIN — PROPOFOL 200 MG: 10 INJECTION, EMULSION INTRAVENOUS at 09:14

## 2019-04-18 RX ADMIN — MIDAZOLAM 2 MG: 1 INJECTION INTRAMUSCULAR; INTRAVENOUS at 09:10

## 2019-04-18 RX ADMIN — GLYCOPYRROLATE 0.8 MG: 0.2 INJECTION, SOLUTION INTRAMUSCULAR; INTRAVENOUS at 10:30

## 2019-04-18 RX ADMIN — NEOSTIGMINE METHYLSULFATE 4 MG: 1 INJECTION, SOLUTION INTRAVENOUS at 10:30

## 2019-04-18 RX ADMIN — ROCURONIUM BROMIDE 5 MG: 10 INJECTION INTRAVENOUS at 09:45

## 2019-04-18 RX ADMIN — FENTANYL CITRATE 50 MCG: 50 INJECTION, SOLUTION INTRAMUSCULAR; INTRAVENOUS at 09:26

## 2019-04-18 RX ADMIN — LIDOCAINE HYDROCHLORIDE 80 MG: 20 INJECTION, SOLUTION INFILTRATION; PERINEURAL at 09:14

## 2019-04-18 RX ADMIN — SODIUM CHLORIDE, POTASSIUM CHLORIDE, SODIUM LACTATE AND CALCIUM CHLORIDE: 600; 310; 30; 20 INJECTION, SOLUTION INTRAVENOUS at 10:07

## 2019-04-18 RX ADMIN — FENTANYL CITRATE 50 MCG: 50 INJECTION, SOLUTION INTRAMUSCULAR; INTRAVENOUS at 11:26

## 2019-04-18 RX ADMIN — ACETAMINOPHEN 975 MG: 325 TABLET, FILM COATED ORAL at 07:37

## 2019-04-18 RX ADMIN — CEFAZOLIN SODIUM 2 G: 2 INJECTION, SOLUTION INTRAVENOUS at 09:25

## 2019-04-18 RX ADMIN — DEXMEDETOMIDINE HYDROCHLORIDE 8 MCG: 100 INJECTION, SOLUTION INTRAVENOUS at 09:30

## 2019-04-18 RX ADMIN — SODIUM CHLORIDE, POTASSIUM CHLORIDE, SODIUM LACTATE AND CALCIUM CHLORIDE: 600; 310; 30; 20 INJECTION, SOLUTION INTRAVENOUS at 09:10

## 2019-04-18 RX ADMIN — FENTANYL CITRATE 50 MCG: 50 INJECTION, SOLUTION INTRAMUSCULAR; INTRAVENOUS at 09:14

## 2019-04-18 RX ADMIN — OXYCODONE HYDROCHLORIDE 5 MG: 5 TABLET ORAL at 11:57

## 2019-04-18 RX ADMIN — DEXAMETHASONE SODIUM PHOSPHATE 4 MG: 4 INJECTION, SOLUTION INTRA-ARTICULAR; INTRALESIONAL; INTRAMUSCULAR; INTRAVENOUS; SOFT TISSUE at 09:25

## 2019-04-18 RX ADMIN — FENTANYL CITRATE 50 MCG: 50 INJECTION, SOLUTION INTRAMUSCULAR; INTRAVENOUS at 10:55

## 2019-04-18 RX ADMIN — FENTANYL CITRATE 50 MCG: 50 INJECTION, SOLUTION INTRAMUSCULAR; INTRAVENOUS at 11:59

## 2019-04-18 RX ADMIN — PROPOFOL: 10 INJECTION, EMULSION INTRAVENOUS at 09:48

## 2019-04-18 RX ADMIN — DEXMEDETOMIDINE HYDROCHLORIDE 4 MCG: 100 INJECTION, SOLUTION INTRAVENOUS at 09:40

## 2019-04-18 RX ADMIN — PHENAZOPYRIDINE HYDROCHLORIDE 200 MG: 200 TABLET ORAL at 07:37

## 2019-04-18 RX ADMIN — ONDANSETRON 4 MG: 2 INJECTION INTRAMUSCULAR; INTRAVENOUS at 09:25

## 2019-04-18 RX ADMIN — KETOROLAC TROMETHAMINE 30 MG: 30 INJECTION, SOLUTION INTRAMUSCULAR at 10:26

## 2019-04-18 ASSESSMENT — COPD QUESTIONNAIRES: COPD: 0

## 2019-04-18 ASSESSMENT — MIFFLIN-ST. JEOR: SCORE: 1464.82

## 2019-04-18 ASSESSMENT — LIFESTYLE VARIABLES: TOBACCO_USE: 0

## 2019-04-18 NOTE — BRIEF OP NOTE
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  Procedure: Procedure(s):  LAPAROSCOPIC SUPRACERVICAL HYSTERECTOMY,POWERED MORCELLATION IN A BAG (LATEX ALLERGY: CONTACT ALLERGY)  BILATERAL LAPAROSCOPIC SALPINGECTOMY  CYSTOSCOPY  Surgeon: Surgeon(s) and Role:  Panel 1:     * Yolande Jackson MD - Primary     * Dionne Parrish MD - Assisting  Panel 2:     * Yolande Jackson MD - Primary  Anesthesia: General   Estimated blood loss: Less than 50 ml  Drains: None  Specimens:   ID Type Source Tests Collected by Time Destination   A : UTERUS AND BILATERAL FALLOPIAN TUBES Tissue Uterus,  Bilateral Fallopian Tubes SURGICAL PATHOLOGY EXAM Yolande Jackson MD 4/18/2019 10:10 AM      Findings:   2 ureteral orifices seen on both sides in bladder, with visible urine spill from all 4 seen.  Normal appearing uterus, tubes and ovaries  Complications: None.    Dr Parrish assistance was required to help with exposure and management of laproscope during surgery

## 2019-04-18 NOTE — ANESTHESIA POSTPROCEDURE EVALUATION
Patient: Rosemarie Michel    Procedure(s):  LAPAROSCOPIC SUPRACERVICAL HYSTERECTOMY,POWERED MORCELLATION IN A BAG (LATEX ALLERGY: CONTACT ALLERGY)  BILATERAL LAPAROSCOPIC SALPINGECTOMY  CYSTOSCOPY    Diagnosis:MENORRHAGIA  Diagnosis Additional Information: No value filed.    Anesthesia Type:  General, ETT    Note:  Anesthesia Post Evaluation    Patient location during evaluation: PACU  Patient participation: Able to fully participate in evaluation  Level of consciousness: awake and alert  Pain management: adequate  Airway patency: patent  Cardiovascular status: acceptable  Respiratory status: acceptable  Hydration status: acceptable  PONV: none     Anesthetic complications: None          Last vitals:  Vitals:    04/18/19 1125 04/18/19 1130 04/18/19 1330   BP: 108/65 108/65 114/71   Pulse:  53    Resp: 16 16 16   Temp: 36.6  C (97.9  F) 36.6  C (97.9  F)    SpO2: 99% 94% 95%         Electronically Signed By: Luke Slaughter MD  April 18, 2019  2:56 PM

## 2019-04-18 NOTE — OP NOTE
Procedure Date: 04/18/2019      PREOPERATIVE DIAGNOSES:  Menorrhagia and failed prior endometrial ablation.      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      PROCEDURES PERFORMED:  Laparoscopic supracervical hysterectomy, powered morcellation in a bag, bilateral laparoscopic salpingectomy and cystoscopy.      SURGEON:  Yolande Jackson MD      ASSISTANT:  Dionne Parrish MD      ANESTHESIA:  General.      ESTIMATED BLOOD LOSS:  Around 50 mL.      DRAINS:  None.      SPECIMENS:  Uterus and bilateral fallopian tubes were sent for pathology.      FINDINGS:  We noted 2 separate ureteral orifices on the right and left side in the bladder during cystoscopy.  Pyridium colored urine was seen coming from all the orifices.  She had normal-appearing uterus, tubes and ovaries.      COMPLICATIONS:  None.        I needed Dr. Parrish' assistance to help with exposure during surgery and management of the laparoscope during surgery.      TECHNIQUE:  The patient was taken to the operating room and placed in supine position.  Anesthesia was administered.  She was placed in lithotomy position.  She was prepped and draped.  Timeout procedure was carried out.  We inserted an open-sided speculum into the vagina.  The cervix was grasped with a single-tooth tenaculum.  Then, the Hulka tenaculum was inserted into the uterine cavity and the single-tooth tenaculum on the cervix was removed.  The cervix was injected with dilute vasopressin and with dilute local anesthetic with epinephrine.  Then, the Estrada catheter was inserted and the instruments were removed from the vagina.        We returned to the abdomen.  We injected the subumbilical area with local anesthesia and then we made a 2-finger width incision on the crease of the lower aspect of the umbilicus.  We did an open cutdown into the abdominal cavity and then we inserted the boot for the port site  and next we insufflated and then inserted the laparoscope.  We did not see any signs of endometriosis or adhesions in the pelvis.  Everything looked pretty normal.  There was a bit of scarring it appeared on the anterior surface of the lower uterine segment.  Next, we used the Thunderbeat instrument and we elevated the patient's left fallopian tube and we cauterized and transected the mesosalpinx below the tube on the left side all the way up to the uterus.  Then, we cauterized and transected the round ligament and then took successive pedicles down over the side of the uterus and were able to take down the bladder flap with careful dissection.  We did the entire same thing on the other side of the patient with no difficulties.  Then, we used the Thunderbeat and we transected the uterus from the cervical stump cleanly without any issues.  The uterus and attached tubes were set up into the right upper quadrant of the patient for now.  We cauterized the endocervical canal and then we reinspected all of the pedicles and there was no sign of any bleeding.  At that point, we removed the TriPort boot and changed over to the boot for the bag.  We inserted the laparoscopic bag into the abdominal cavity and it was inflated with carbon dioxide.  The lateral ports were removed from the lower quadrant, there was one on each side, and we placed the uterus and the fallopian tubes that were attached into the bag and elevated the neck of the bag up through the central port and then reinflated it.  At that point, we were able put the morcellator instrument through the umbilical port and we easily could see the specimen and we had good inflation of the bag at that point.  The uterus was carefully morcellated in multiple strips through the morcellating instrument and those pieces were all removed through the umbilical port.  Then, we removed all instruments and we were able to remove the bag through the umbilicus without any sign of  leakage.  The fascial area was closed with Vicryl in a running fashion and then the skin incisions were all closed with 4-0 Monocryl in a subcuticular fashion.  Dr. Parrish completed the cystoscopy while I was closing skin incisions and we noted the patient appears to have ureteral duplication on both sides and we could clearly see Pyridium colored urine spilling from each of the 4 ureteral orifices.  There was no sign of any injury to the bladder, so then at that point, all the instruments were removed, the Estrada was reinserted and the patient went to the recovery room in stable condition.         SALINA JIMENEZ MD             D: 2019   T: 2019   MT: BETY      Name:     PARGA SOTELO   MRN:      [id number]        Account:        [id number]   :      1970           Procedure Date: 2019      Document: [id number]

## 2019-04-18 NOTE — ANESTHESIA CARE TRANSFER NOTE
Patient: Rosemarie Michel    Procedure(s):  LAPAROSCOPIC SUPRACERVICAL HYSTERECTOMY,POWERED MORCELLATION IN A BAG (LATEX ALLERGY: CONTACT ALLERGY)  BILATERAL LAPAROSCOPIC SALPINGECTOMY  CYSTOSCOPY    Diagnosis: MENORRHAGIA  Diagnosis Additional Information: No value filed.    Anesthesia Type:   General, ETT     Note:  Airway :Face Mask  Patient transferred to:PACU  Comments: Neuromuscular blockade reversed after TOF 4/4, spontaneous respirations, adequate tidal volumes, followed commands to voice, oropharynx suctioned with soft flexible catheter, extubated atraumatically, extubated with suction, airway patent after extubation.  Oxygen via facemask at 8 liters per minute to PACU. Oxygen tubing connected to wall O2 in PACU, SpO2, NiBP, and EKG monitors and alarms on and functioning, Stacy Hugger warmer connected to patient gown, report on patient's clinical status given to PACU RN, RN questions answered. Handoff Report: Identifed the Patient, Identified the Reponsible Provider, Reviewed the pertinent medical history, Discussed the surgical course, Reviewed Intra-OP anesthesia mangement and issues during anesthesia, Set expectations for post-procedure period and Allowed opportunity for questions and acknowledgement of understanding      Vitals: (Last set prior to Anesthesia Care Transfer)    CRNA VITALS  4/18/2019 1015 - 4/18/2019 1050      4/18/2019             Pulse:  70    SpO2: 99%    Resp Rate (observed):  21    Resp Rate (set):  10                Electronically Signed By: CHENCHO Livingston CRNA  April 18, 2019  10:50 AM

## 2019-04-18 NOTE — DISCHARGE INSTRUCTIONS
Today you were given 975 mg of Tylenol at 7:37am. The recommended daily maximum dose is 4000 mg.     Today you received Toradol, an antiinflammatory medication similar to Ibuprofen.  You should not take other antiinflammatory medication, such as Ibuprofen, Motrin, Advil, Aleve, Naprosyn, etc until 4:30pm.       While you were at the hospital today you were given a medication called Pyridium.  This is used to treat pain, burning, increased urination, and increased urge to urinate.  Pyridium will most likely darken the color of your urine to an orange or red color. Darkened urine may also cause stains to your underwear, which may or may not be removed by laundering.  Information for Patients Discharging with a Transderm Scopolamine Patch     Dry mouth is a common side effect.    Drowsiness is another common side effect especially when combined with pain medication.  Please avoid activities that require mental alertness such as driving a car or making important legal decisions.    Since Scopolamine can cause temporary dilation of the pupils and blurred vision if it comes in contact with the eyes; be sure to wash your hands thoroughly with soap and water immediately after handling the patch.   When you remove your patch, please stick it to a tissue or paper towel for disposal.      Remove the patch immediately and contact a physician in the unlikely event that you experience symptoms of acute glaucoma (pain and reddening of the eyes, accompanied by dilated pupils).    Remove the patch if you develop any difficulties urinating.  If you cannot urinate after removing your patch, please notify your surgeon.    Remove the patch 24 hours after surgery.      Same Day Surgery Discharge Instructions for  Sedation and General Anesthesia       It's not unusual to feel dizzy, light-headed or faint for up to 24 hours after surgery or while taking pain medication.  If you have these symptoms: sit for a few minutes before standing and  have someone assist you when you get up to walk or use the bathroom.      You should rest and relax for the next 24 hours. We recommend you make arrangements to have an adult stay with you for at least 24 hours after your discharge.  Avoid hazardous and strenuous activity.      DO NOT DRIVE any vehicle or operate mechanical equipment for 24 hours following the end of your surgery.  Even though you may feel normal, your reactions may be affected by the medication you have received.      Do not drink alcoholic beverages for 24 hours following surgery.       Slowly progress to your regular diet as you feel able. It's not unusual to feel nauseated and/or vomit after receiving anesthesia.  If you develop these symptoms, drink clear liquids (apple juice, ginger ale, broth, 7-up, etc. ) until you feel better.  If your nausea and vomiting persists for 24 hours, please notify your surgeon.        All narcotic pain medications, along with inactivity and anesthesia, can cause constipation. Drinking plenty of liquids and increasing fiber intake will help.      For any questions of a medical nature, call your surgeon.      Do not make important decisions for 24 hours.      If you had general anesthesia, you may have a sore throat for a couple of days related to the breathing tube used during surgery.  You may use Cepacol lozenges to help with this discomfort.  If it worsens or if you develop a fever, contact your surgeon.       If you feel your pain is not well managed with the pain medications prescribed by your surgeon, please contact your surgeon's office to let them know so they can address your concerns.     Discharge Instructions for Laparoscopic Hysterectomy    Incisional Care  A small amount of drainage from your incisions is normal. You may wear Band Aids until the drainage stops. The day after surgery, if your incisions are dry, remove the Band Aids. Leave the Steri-Strips (small pieces of tape) in place. Let them fall  off on their own, or gently remove them after 7 days.  Once your have removed your Band Aids, you may shower as usual. Wash your incisions with mild soap and water. Pat dry.  Don't use oils, powders, or lotions on your incisions.  General Care  Take your medications exactly as directed by your doctor.    You may have vaginal bleeding that can last for several weeks. Use pads only. Don't put anything in your vagina (tampons, douches or have sexual intercourse) until your doctor says it's safe to do so.  Avoid constipation.  Eat fruits, vegetables, and whole grains.  Drink 6-8 glasses of water a day, unless told to do otherwise.  Use a laxative or a mild stool softener if your doctor says it's okay.  Activity  Don't drive while you are still taking narcotic pain medications.  Don t do strenuous activities until the doctor says it's okay.  Walk as often as you feel able.    Pain  Your incisions may be tender or sore. You may also have pain in your upper back or shoulders. This is from the gas used to enlarge your abdomen to allow your doctor to see inside your pelvis and perform the procedure. This pain usually goes away in a day or two.   When to Call Your Doctor  Call your doctor right away if you have any of the following:  Fever above 100.4 F (38 C) or chills  Vaginal bleeding that soaks more than one sanitary pad per hour  A foul smelling discharge from the vagina  Difficulty urinating  Severe pain   Redness, swelling, or drainage at your incision sites  Follow-Up  Make a follow-up appointment as directed by your surgeon.    **If you have questions or concerns about your procedure,   call Dr. Jackson at [phone]**    Cystoscopy Discharge Instructions      Diet:    Return to the diet that you were on before the procedure, unless you are given specific diet instructions.    It is important to drink 6-8 glasses of fluids per day at home - at least 3-4 glasses should be water.    Activity:    Walk short distances  and increase as your strength allows.    You may climb stairs.    Do not do strenuous exercise or heavy lifting until approved by surgeon.    Do not drive while taking narcotic pain medications.    Bathing:    You may take a shower.    Call your physician if these signs/symptoms are present:    Pain that is not relieved by a short rest or ordered pain medications.    Temperature at or above 101.0 F or chills.    Inability or difficulty urinating.  Excessive blood in urine.    Any questions or concerns.

## 2019-04-18 NOTE — ANESTHESIA PREPROCEDURE EVALUATION
Anesthesia Pre-Procedure Evaluation    Patient: Rosemarie Michel   MRN: [id number] : 1970          Preoperative Diagnosis: MENORRHAGIA    Procedure(s):  LAPAROSCOPIC SUPRACERVICAL HYSTERECTOMY,POWERED MORCELLATION IN A BAG (LATEX ALLERGY: CONTACT ALLERGY)  BILATERAL LAPAROSCOPIC SALPINGECTOMY  CYSTOSCOPY    Past Medical History:   Diagnosis Date     Abnormal Pap smear     , normal paps since     BPPV (benign paroxysmal positional vertigo)      Cervical disc disorder      DUB (dysfunctional uterine bleeding)      Hyperlipidemia      Mastodynia 2014    left breast  tenderness     Menorrhagia      Motion sickness      Pregnancy induced hypertension      Past Surgical History:   Procedure Laterality Date     AS HYSTEROSCOPY, SURGICAL; W/ ENDOMETRIAL ABLATION, ANY METHOD       GYN SURGERY      LEEP, c. section       Anesthesia Evaluation     . Pt has not had prior anesthetic     No history of anesthetic complications (no fam hx)          ROS/MED HX    ENT/Pulmonary:      (-) tobacco use, asthma and COPD   Neurologic:      (-) CVA, TIA and Neuropathy   Cardiovascular:     (+) Dyslipidemia, hypertension----. : . . . :. .      (-) CAD, irregular heartbeat/palpitations and stent   METS/Exercise Tolerance:     Hematologic:        (-) anemia   Musculoskeletal:         GI/Hepatic:        (-) GERD and liver disease   Renal/Genitourinary:     (+) Other Renal/ Genitourinary,    (-) renal disease   Endo:      (-) Type I DM, Type II DM and thyroid disease   Psychiatric:         Infectious Disease:  - neg infectious disease ROS       Malignancy:         Other:                          Physical Exam  Normal systems: cardiovascular, pulmonary and dental    Airway   Mallampati: II  TM distance: >3 FB  Neck ROM: full    Dental     Cardiovascular   Rhythm and rate: regular and normal      Pulmonary    breath sounds clear to auscultation            Lab Results   Component Value Date    WBC 9.8 2019    HGB 14.5  "03/06/2019    HCT 42.7 03/06/2019     03/06/2019     02/19/2019    POTASSIUM 4.3 02/19/2019    CHLORIDE 102 02/19/2019    CO2 26 02/19/2019    BUN 16 02/19/2019    CR 0.62 02/19/2019    GLC 86 02/19/2019    VARUN 9.3 02/19/2019    ALBUMIN 4.5 02/19/2019    PROTTOTAL 8.0 02/19/2019    ALT 22 02/19/2019    AST 14 02/19/2019    ALKPHOS 65 02/19/2019    BILITOTAL 0.5 02/19/2019    TSH 1.24 10/15/2018       Preop Vitals  BP Readings from Last 3 Encounters:   04/18/19 (!) 148/99   02/19/19 122/78   01/08/19 136/80    Pulse Readings from Last 3 Encounters:   02/19/19 84   11/20/18 89   10/15/18 102      Resp Readings from Last 3 Encounters:   04/18/19 16   11/20/18 16   06/28/18 12    SpO2 Readings from Last 3 Encounters:   04/18/19 98%   11/20/18 99%   06/28/18 97%      Temp Readings from Last 1 Encounters:   04/18/19 36.6  C (97.8  F) (Oral)    Ht Readings from Last 1 Encounters:   04/18/19 1.613 m (5' 3.5\")      Wt Readings from Last 1 Encounters:   04/18/19 85.3 kg (188 lb)    Estimated body mass index is 32.78 kg/m  as calculated from the following:    Height as of this encounter: 1.613 m (5' 3.5\").    Weight as of this encounter: 85.3 kg (188 lb).       Anesthesia Plan      History & Physical Review  History and physical reviewed and following examination; no interval change.    ASA Status:  2 .    NPO Status:  > 8 hours    Plan for General and ETT with Intravenous induction. Maintenance will be TIVA.    PONV prophylaxis:  Ondansetron (or other 5HT-3), Dexamethasone or Solumedrol and Scopolamine patch       Postoperative Care  Postoperative pain management:  Oral pain medications and Multi-modal analgesia.      Consents  Anesthetic plan, risks, benefits and alternatives discussed with:  Patient.  Use of blood products discussed: Yes.   Use of blood products discussed with Patient.  Consented to blood products.  .                 Luke Slaughter MD  "

## 2019-04-19 LAB — COPATH REPORT: NORMAL

## 2019-04-19 NOTE — OR NURSING
Pt c/o very sore throat, bruising and 2 red spots.  Pt denies sob or swelling.  Dr. Boucher notified, instructed nurse to call patient with recommendations of throat lozenges and treat as any other sore throat, if swelling occurs, contact urgent care, Dr. Jackson's office or call 911 if emergency.  Pt receptive to these suggestions.

## 2019-04-25 ENCOUNTER — TELEPHONE (OUTPATIENT)
Dept: OBGYN | Facility: CLINIC | Age: 49
End: 2019-04-25

## 2019-04-30 NOTE — PROGRESS NOTES
SUBJECTIVE:                                                   Rosemarie Michel is a 49 year old female who presents to clinic today for the following health issue(s):  Patient presents with:  Post-op Visit: Incisions are itchy. Some abdominal pain when BM or coughing. Clear and cloudy discharge on and off.       Additional information: LAPAROSCOPIC SUPRACERVICAL HYSTERECTOMY,POWERED MORCELLATION IN A BAG. BILATERAL LAPAROSCOPIC SALPINGECTOMY (Bilateral Abdomen) CYSTOSCOPY on 19      HPI:  Patient is doing well  Still very tired in afternoons, needs some help with lifting things  Incisions healing ok  Path showed adenomyosis    Patient's last menstrual period was 2019..   Patient is not sexually active, .  Using hysterectomy for contraception.    reports that she has been smoking cigarettes.  She has a 5.25 pack-year smoking history. She has never used smokeless tobacco.  Tobacco Cessation Action Plan: Information offered: Patient not interested at this time  STD testing offered?  Declined    Health maintenance updated:  yes    Today's PHQ-2 Score:   PHQ-2 (  [employer]) 2017   Q1: Little interest or pleasure in doing things 0   Q2: Feeling down, depressed or hopeless 0   PHQ-2 Score 0     Today's PHQ-9 Score:   PHQ-9 SCORE 2018   PHQ-9 Total Score 2     Today's DANII-7 Score:   DANII-7 SCORE 2018   Total Score 0       Problem list and histories reviewed & adjusted, as indicated.  Additional history: as documented.    Patient Active Problem List   Diagnosis     Hyperlipidemia LDL goal <160     Excessive bleeding in premenopausal period     BPPV (benign paroxysmal positional vertigo)     Hypertension goal BP (blood pressure) < 140/90     Past Surgical History:   Procedure Laterality Date     AS HYSTEROSCOPY, SURGICAL; W/ ENDOMETRIAL ABLATION, ANY METHOD       CYSTOSCOPY N/A 2019    Procedure: CYSTOSCOPY;  Surgeon: Yolande Jackson MD;  Location: [facility] OR     GYN SURGERY       fran FRANCIS section     LAPAROSCOPIC HYSTERECTOMY SUPRACERVICAL N/A 4/18/2019    Procedure: LAPAROSCOPIC SUPRACERVICAL HYSTERECTOMY,POWERED MORCELLATION IN A BAG (LATEX ALLERGY: CONTACT ALLERGY);  Surgeon: Yolande aJckson MD;  Location:  OR     LAPAROSCOPIC SALPINGECTOMY Bilateral 4/18/2019    Procedure: BILATERAL LAPAROSCOPIC SALPINGECTOMY;  Surgeon: Yolande Jackson MD;  Location:  OR      Social History     Tobacco Use     Smoking status: Current Every Day Smoker     Packs/day: 0.25     Years: 21.00     Pack years: 5.25     Types: Cigarettes     Smokeless tobacco: Never Used     Tobacco comment: 5-6 cigs per day   Substance Use Topics     Alcohol use: Yes     Alcohol/week: 0.0 oz     Comment:  5 Beers Through Whole Pregnancy      Problem (# of Occurrences) Relation (Name,Age of Onset)    Breast Cancer (1) Mother    Cancer (1) Father (62): bladder    Diabetes (3) Mother (57): type 2, Father (60): type 2, Brother (42): type 2    Hyperlipidemia (3) Mother, Father, Paternal Grandmother            Current Outpatient Medications   Medication Sig     acetaminophen (TYLENOL) 500 MG tablet Take 500-1,000 mg by mouth every 6 hours as needed for mild pain     citalopram (CELEXA) 10 MG tablet TAKE 1 TABLET(10 MG) BY MOUTH DAILY     hydrochlorothiazide (HYDRODIURIL) 25 MG tablet Take 0.5 tablets (12.5 mg) by mouth daily     hydrocortisone (CORTAID) 1 % external ointment Apply topically three times a week     ibuprofen (ADVIL/MOTRIN) 200 MG tablet Take 200-600 mg by mouth every 4 hours as needed for mild pain     multivitamin w/minerals (THERA-VIT-M) tablet Take 1 tablet by mouth daily     No current facility-administered medications for this visit.      Allergies   Allergen Reactions     Latex      Shellfish Allergy        ROS:  12 point review of systems negative other than symptoms noted below.    OBJECTIVE:     /78 (BP Location: Right arm, Patient Position: Sitting, Cuff Size: Adult Regular)   Pulse 96   Wt  84.4 kg (186 lb)   LMP 04/16/2019   Breastfeeding? No   BMI 32.43 kg/m    Body mass index is 32.43 kg/m .    Exam:  Constitutional:  Appearance: Well nourished, well developed alert, in no acute distress  Chest:  Respiratory Effort:  Breathing unlabored  Neurologic/Psychiatric:  Mental Status:  Oriented X3      In-Clinic Test Results:  No results found for this or any previous visit (from the past 24 hour(s)).    ASSESSMENT/PLAN:                                                        ICD-10-CM    1. Postop check Z09        Continue recovery  Gradually increase activity  Discussed path      Yolande Jackson MD  [facility]

## 2019-05-01 ENCOUNTER — OFFICE VISIT (OUTPATIENT)
Dept: OBGYN | Facility: CLINIC | Age: 49
End: 2019-05-01
Payer: COMMERCIAL

## 2019-05-01 VITALS
WEIGHT: 186 LBS | BODY MASS INDEX: 32.43 KG/M2 | DIASTOLIC BLOOD PRESSURE: 78 MMHG | HEART RATE: 96 BPM | SYSTOLIC BLOOD PRESSURE: 130 MMHG

## 2019-05-01 DIAGNOSIS — Z09 POSTOP CHECK: Primary | ICD-10-CM

## 2019-05-01 PROCEDURE — 99024 POSTOP FOLLOW-UP VISIT: CPT | Performed by: OBSTETRICS & GYNECOLOGY

## 2019-05-17 ENCOUNTER — TELEPHONE (OUTPATIENT)
Dept: OBGYN | Facility: CLINIC | Age: 49
End: 2019-05-17

## 2019-05-17 NOTE — TELEPHONE ENCOUNTER
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     LMTCB  Sierra Flores RN on 5/17/2019 at 12:47 PM

## 2019-05-17 NOTE — TELEPHONE ENCOUNTER
Pt wanted to confirm that the information her  was giving her was correct. Pt informed of procedure note and read what it said. Pt told no follow-up is needed. Pt needs to know if a problem would occur and inform provider. No further questions.

## 2019-05-17 NOTE — TELEPHONE ENCOUNTER
Patient had a recent Surgery with . Her  just told her that  had told him that there was an issue with her Kidney. Patient would like  to call her to discuss.

## 2019-06-11 ENCOUNTER — TELEPHONE (OUTPATIENT)
Dept: OBGYN | Facility: CLINIC | Age: 49
End: 2019-06-11

## 2019-06-11 NOTE — LETTER
[facility]  [address] [place] [id number]  Phone: [phone]  Fax: [phone]    June 12, 2019        Rosemarie Michel  [address]  [facility] [address]          To whom it may concern:    RE: Rosemarie Michel    Patient may return to work 6/18/19 with the following:  No restrictions    Please contact me for questions or concerns.      Sincerely,        Yolande Jackson MD

## 2019-06-11 NOTE — TELEPHONE ENCOUNTER
4/18/19  Laparoscopic supracervical hysterectomy, powered morcellation in a bag, bilateral laparoscopic salpingectomy and cystoscopy.  Pt will be going back to work on Tuesday 6/18/19. Is an , mostly desk work. Routing to Dr. Jackson. OK to write letter.   5/1/19 Continue recovery  Gradually increase activity  Discussed path    Send to Claims  [email or web address]  Pt would also like letter emailed to her at [email or web address]

## 2019-06-13 DIAGNOSIS — F43.23 ADJUSTMENT DISORDER WITH MIXED ANXIETY AND DEPRESSED MOOD: ICD-10-CM

## 2019-06-13 DIAGNOSIS — I10 HYPERTENSION GOAL BP (BLOOD PRESSURE) < 140/90: ICD-10-CM

## 2019-06-13 NOTE — TELEPHONE ENCOUNTER
hctz      Last Written Prescription Date:  3/27/19  Last Fill Quantity: 135,   # refills: 1  Last Office Visit: 11/20/18  Future Office visit:       Routing refill request to provider for review/approval because:  Drug not on the [facility], [facility] or [facility] refill protocol or controlled substance    citalopram      Last Written Prescription Date:  3/14/19  Last Fill Quantity: 90,   # refills: 0  Last Office Visit: 11/20/18  Future Office visit:       Routing refill request to provider for review/approval because:  Drug not on the [facility], [facility] or [facility] refill protocol or controlled substance

## 2019-06-14 RX ORDER — CITALOPRAM HYDROBROMIDE 10 MG/1
10 TABLET ORAL DAILY
Qty: 90 TABLET | Refills: 3 | Status: SHIPPED | OUTPATIENT
Start: 2019-06-14 | End: 2020-05-12

## 2019-06-14 RX ORDER — HYDROCHLOROTHIAZIDE 25 MG/1
12.5 TABLET ORAL DAILY
Qty: 135 TABLET | Refills: 1 | Status: SHIPPED | OUTPATIENT
Start: 2019-06-14 | End: 2020-05-12

## 2019-07-24 ENCOUNTER — OFFICE VISIT (OUTPATIENT)
Dept: FAMILY MEDICINE | Facility: CLINIC | Age: 49
End: 2019-07-24
Payer: COMMERCIAL

## 2019-07-24 ENCOUNTER — TELEPHONE (OUTPATIENT)
Dept: FAMILY MEDICINE | Facility: CLINIC | Age: 49
End: 2019-07-24

## 2019-07-24 VITALS
HEART RATE: 94 BPM | SYSTOLIC BLOOD PRESSURE: 122 MMHG | WEIGHT: 194.2 LBS | TEMPERATURE: 98.7 F | BODY MASS INDEX: 33.86 KG/M2 | DIASTOLIC BLOOD PRESSURE: 82 MMHG | OXYGEN SATURATION: 97 % | RESPIRATION RATE: 16 BRPM

## 2019-07-24 DIAGNOSIS — H00.11 CHALAZION OF RIGHT UPPER EYELID: Primary | ICD-10-CM

## 2019-07-24 PROCEDURE — 99213 OFFICE O/P EST LOW 20 MIN: CPT | Performed by: FAMILY MEDICINE

## 2019-07-24 RX ORDER — ERYTHROMYCIN 5 MG/G
OINTMENT OPHTHALMIC 3 TIMES DAILY
Refills: 0 | COMMUNITY
Start: 2019-07-20 | End: 2019-08-01

## 2019-07-24 RX ORDER — CEPHALEXIN 500 MG/1
500 CAPSULE ORAL 4 TIMES DAILY
Qty: 28 CAPSULE | Refills: 0 | Status: SHIPPED | OUTPATIENT
Start: 2019-07-24 | End: 2019-08-01

## 2019-07-24 ASSESSMENT — PATIENT HEALTH QUESTIONNAIRE - PHQ9: SUM OF ALL RESPONSES TO PHQ QUESTIONS 1-9: 0

## 2019-07-24 ASSESSMENT — PAIN SCALES - GENERAL: PAINLEVEL: SEVERE PAIN (6)

## 2019-07-24 NOTE — PROGRESS NOTES
Chief complaint: right eyelid     Right eyelid swelling   Patient used old makeup last week  The next day started getting swelling not too bad initially was just slightly swollen and scratchy and saw couple of dots in the eylashes   Patient went to urgent care 4 days now on erythromycin ointment  Today is more swollen     Warm compresses seem to help  denies photophobia  denies feeling of foreign body  denies wearing contact lenses  denies eye pain  denies blurring of vision  denies URI symptoms  Tried supportive treatment no relief  Worsening symptoms hence came in    Problem list, Medication list, Allergies, and Medical/Social/Surgical histories reviewed in [facility] and updated as appropriate.    ROS:  General: negative for fever  EYE: as above  No fevers or chills chest pain or shortness of breath     OBJECTIVE:  /82   Pulse 94   Temp 98.7  F (37.1  C) (Oral)   Resp 16   Wt 88.1 kg (194 lb 3.2 oz)   LMP 04/16/2019   SpO2 97%   BMI 33.86 kg/m     General : Awake Alert not in any acute cardiorespiratory distress  Head:       Normocephalic Atraumatic  Eyes:    Pupils equally reactive to light and accomodation. Sclera not icteric. Extra occular muscles intact full and equal. No pain with extraocular muscle movement No hyphema, no hypopyon, no ciliary flush. No periorbital cellulitis. Mild erythema of  right  Conjunctiva (minimal)  Right upper eyelid chalazion noted with surrounding eyelid tissue swelling and redness. Minimal warmth and tenderness.   Neurologic: No cranial nerve deficits.   Psych: Appropriate mood and affect. Pleasant  Skin: patient undressed to level of his/her comfort. No visible concerning lesions.      ASSESSMENT:    ICD-10-CM    1. Chalazion of right upper eyelid H00.11 cephALEXin (KEFLEX) 500 MG capsule           PLAN:   No response to ointment  Given worsening swelling recommend oral antibiotic  discussed risk for development of preseptal cellulitis - none yet at this time   Prescribed  with keflex  Recommend eye clinic follow up in 2 days - patient will schedule  If worsening redness or swelling go to ER  Signs or symptoms of orbital cellulitis or preseptal cellulitis reviewed   Advised about symptoms which might herald more serious problems.    adverse reactions of medication discussed  advised to come back in right away if with any worsening symptoms or if with no relief   aware to come in right away especially if with any blurring of vision, photophobia, pain, feeling of foreign body.   despite treatment plan  patient voiced understanding and had no further questions at this time.        Lora Galvez MD

## 2019-07-24 NOTE — TELEPHONE ENCOUNTER
Reason for call:  Patient reporting a symptom    Symptom or request:  ask that we speak with Rosemarie.  She was seen in  on Saturday and was given an ointment for her right eye - possible pink eye.  This ointment is not working for her.  She does have an appointment with [place] [facility] today at 2pm.  Not sure what she is needing from us.  Please call and assess. Thank you..Eliane Fierro    Duration (how long have symptoms been present): since Saturday    Have you been treated for this before? Yes 7/20/19    Phone Number patient can be reached at:  Home number on file [phone] (home)    Best Time:  Any time before 2pm    Call taken on 7/24/2019 at 10:09 AM by Eliane Fierro

## 2019-07-24 NOTE — TELEPHONE ENCOUNTER
Patient advised to keep appointment in [place] this afternoon as we are full and if not getting better should be assessed. Advised if she has an eye doctor she could go see them as well. She does not so will keep the appointment in [place] this afternoon.    Susan Pham RN

## 2019-07-24 NOTE — NURSING NOTE
"Chief Complaint   Patient presents with     Eye Problem     right eye not improving with medications      Health Maintenance     [id number]       Initial /82   Pulse 94   Temp 98.7  F (37.1  C) (Oral)   Resp 16   Wt 88.1 kg (194 lb 3.2 oz)   LMP 04/16/2019   SpO2 97%   BMI 33.86 kg/m   Estimated body mass index is 33.86 kg/m  as calculated from the following:    Height as of 4/18/19: 1.613 m (5' 3.5\").    Weight as of this encounter: 88.1 kg (194 lb 3.2 oz).  Medication Reconciliation: complete  Patrick Roberto CMA    "

## 2019-07-31 ENCOUNTER — TELEPHONE (OUTPATIENT)
Dept: FAMILY MEDICINE | Facility: CLINIC | Age: 49
End: 2019-07-31

## 2019-07-31 NOTE — TELEPHONE ENCOUNTER
"Patient treated 7/24/19 for Chalazion of right upper eyelid by Dr Holley Galvez.   Patient treated with Keflex.  See notes below.    ASSESSMENT:      ICD-10-CM     1. Chalazion of right upper eyelid H00.11 cephALEXin (KEFLEX) 500 MG capsule               PLAN:   No response to ointment  Given worsening swelling recommend oral antibiotic  discussed risk for development of preseptal cellulitis - none yet at this time   Prescribed with keflex  Recommend eye clinic follow up in 2 days - patient will schedule  If worsening redness or swelling go to ER  Signs or symptoms of orbital cellulitis or preseptal cellulitis reviewed   Advised about symptoms which might herald more serious problems.      See above, patient went to [facility] yesterday.  Saw Optometrist Dr Spence yesterday.  He stated area was no longer infected.  Patient has 1 dose left of the Keflex.  The area improving, however is still \"awfully big\".  Patient did not ask the Optometrist for refill of antibiotic.  Patient is requesting Dr Holley Galvez refill antibiotic.  Did discuss this would be more appropriate for Optometrist who just saw patient address.  Patient request refill be sent to Dr Holley Galvez first.     Please advise  Selina Malik RN         "

## 2019-07-31 NOTE — TELEPHONE ENCOUNTER
Please inform that for antibiotics I would recommend patient being seen to evaluate if necessary to refill or not. Depending on evaluation antibiotic may be represcribed if needed. The reason is antibiotics can cause side-effects and may also make her prone to antibiotic-associated infections which can be serious. So as a rule these are not called in without being seen.   Thanks  Lora Galvez M.D.

## 2019-07-31 NOTE — TELEPHONE ENCOUNTER
Patient is calling to ask provider would send in a refill script for the keflex she is still having some issues with the sty    Please call to advice  Thank you

## 2019-07-31 NOTE — TELEPHONE ENCOUNTER
Patient notified of provider note as written below. She has an appointment tomorrow as listed below.  She will keep this appointment and follow up from there after that providers recommendations.  Patient verbalizes good understanding, agrees with plan and states she needs no further support. Jailyn Granda R.N.

## 2019-08-01 ENCOUNTER — OFFICE VISIT (OUTPATIENT)
Dept: OPHTHALMOLOGY | Facility: CLINIC | Age: 49
End: 2019-08-01
Payer: COMMERCIAL

## 2019-08-01 DIAGNOSIS — H00.11 CHALAZION OF RIGHT UPPER EYELID: Primary | ICD-10-CM

## 2019-08-01 PROCEDURE — 92002 INTRM OPH EXAM NEW PATIENT: CPT | Performed by: STUDENT IN AN ORGANIZED HEALTH CARE EDUCATION/TRAINING PROGRAM

## 2019-08-01 ASSESSMENT — SLIT LAMP EXAM - LIDS: COMMENTS: NORMAL

## 2019-08-01 ASSESSMENT — EXTERNAL EXAM - RIGHT EYE: OD_EXAM: NORMAL

## 2019-08-01 ASSESSMENT — VISUAL ACUITY
OS_SC: 20/30
OS_PH_SC: 20/20
OD_PH_SC: 20/25
OD_SC: 20/60
OS_SC+: -2
OS_PH_SC+: -2
METHOD: SNELLEN - LINEAR
OD_PH_SC+: -1

## 2019-08-01 ASSESSMENT — EXTERNAL EXAM - LEFT EYE: OS_EXAM: NORMAL

## 2019-08-01 NOTE — PROGRESS NOTES
The following medication was given:     MEDICATION:Lido with Epi 2%  ROUTE: Right upper eyelid  SITE: skin  DOSE: 0.1 mL  LOT #: [id number]  :  [employer]  EXPIRATION DATE:  04/20  NDC#:[id number]

## 2019-08-01 NOTE — PROGRESS NOTES
Current Eye Medications:  Hot compresses 10-15 times a day until the past week.     Subjective:  Dr. Galvez recommended an evaluation of a chalazion, right upper eyelid, by Dr. Wilson.  Patient states she developed a bump on her right upper eyelid about 16 days ago.  Initially her entire right upper eyelid and into the right side of her nose was very red, swollen, and tender, but now there is a more discreet bump on her right upper eyelid.  About one week ago, she had some drainage after she went to see Dr. Galvez whom prescribed an oral antibiotic which has helped the bump, but she is not tolerating it, so she discontinued it.  She had also used erythromycin ointment, which did not help.  Each morning she has some crusting on her eyelids. Her vision in her right eye is slightly blurry.  She does not wear prescription glasses.  If Dr. Wilson opens the bump, she is wondering if she could video the procedure with her phone.      Objective:  See Ophthalmology Exam.       Assessment:  Rosemarie Michel is a 49 year old female who presents with:   Encounter Diagnosis   Name Primary?     Chalazion of right upper eyelid Will continue oral antibiotics and schedule incision and drainage in a couple of weeks.       Plan:  Use Keflex 250mg four times daily for 10 days   Schedule incision and drainage for 2 weeks   1. Apply a hot compress for 15 minutes at least 4 times a day. Use a microwaveable pack filled with dry,uncooked rice, gel beads, etc.  This will reduce the swelling and soften the hardened oils blocking the duct.   2. Massage the area gently after applying the compress to promote drainage.  Do not try to pop or squeeze the chalazion.  3. Once a day, with eyes closed, clean your eyelids with baby shampoo to help  reduce clogging of the duct, as well as help prevent recurrences.  If the bump does not resolve with hot packing after a few weeks, we may be able to incise and drain the bump or inject steroids into the  magdaleno.  Regina Wilson MD  [phone]

## 2019-08-01 NOTE — LETTER
8/1/2019         RE: Rosemarie Michel  [address]  [facility] [address]        Dear Colleague,    Thank you for referring your patient, Rosemarie Michel, to the [facility]. Please see a copy of my visit note below.     Current Eye Medications:  Hot compresses 10-15 times a day until the past week.     Subjective:  Dr. Galvez recommended an evaluation of a chalazion, right upper eyelid, by Dr. Wilson.  Patient states she developed a bump on her right upper eyelid about 16 days ago.  Initially her entire right upper eyelid and into the right side of her nose was very red, swollen, and tender, but now there is a more discreet bump on her right upper eyelid.  About one week ago, she had some drainage after she went to see Dr. Galvez whom prescribed an oral antibiotic which has helped the bump, but she is not tolerating it, so she discontinued it.  She had also used erythromycin ointment, which did not help.  Each morning she has some crusting on her eyelids. Her vision in her right eye is slightly blurry.  She does not wear prescription glasses.  If Dr. Wilson opens the bump, she is wondering if she could video the procedure with her phone.      Objective:  See Ophthalmology Exam.       Assessment:  Rosemarie Michel is a 49 year old female who presents with:   Encounter Diagnosis   Name Primary?     Chalazion of right upper eyelid Will continue oral antibiotics and schedule incision and drainage in a couple of weeks.       Plan:  Use Keflex 250mg four times daily for 10 days   Schedule incision and drainage for 2 weeks   1. Apply a hot compress for 15 minutes at least 4 times a day. Use a microwaveable pack filled with dry,uncooked rice, gel beads, etc.  This will reduce the swelling and soften the hardened oils blocking the duct.   2. Massage the area gently after applying the compress to promote drainage.  Do not try to pop or squeeze the chalazion.  3. Once a day, with eyes  closed, clean your eyelids with baby shampoo to help  reduce clogging of the duct, as well as help prevent recurrences.  If the bump does not resolve with hot packing after a few weeks, we may be able to incise and drain the bump or inject steroids into the bump.  Regina Wilson MD  [phone]    Again, thank you for allowing me to participate in the care of your patient.        Sincerely,        Regina Wilson MD

## 2019-08-01 NOTE — PATIENT INSTRUCTIONS
Use Keflex 250mg four times daily for 10 days   Schedule incision and drainage for 2 weeks     Chalazion  There are tiny oil glands in the upper eyelid near the eyelashes. They help lubricate the eyes.   If these glands become blocked, a small hard lump forms in the upper eyelid, called a chalazion.   The lump can take several weeks to grow, causing pain and tenderness, sensitivity to light, and increased tearing.    Home Care  1. Apply a hot compress for 15 minutes at least 4 times a day. Use a microwaveable pack filled with dry,uncooked rice, gel beads, etc.  This will reduce the swelling and soften the hardened oils blocking the duct.   2. Massage the area gently after applying the compress to promote drainage.  Do not try to pop or squeeze the chalazion.  3. Once a day, with eyes closed, clean your eyelids with baby shampoo to help  reduce clogging of the duct, as well as help prevent recurrences.  If the bump does not resolve with hot packing after a few weeks, we may be able to  incise and drain the bump or inject steroids into the bump.  Regina Wilson MD  [phone]

## 2019-08-15 ENCOUNTER — OFFICE VISIT (OUTPATIENT)
Dept: OPHTHALMOLOGY | Facility: CLINIC | Age: 49
End: 2019-08-15
Payer: COMMERCIAL

## 2019-08-15 DIAGNOSIS — H00.11 CHALAZION OF RIGHT UPPER EYELID: Primary | ICD-10-CM

## 2019-08-15 PROCEDURE — 92012 INTRM OPH EXAM EST PATIENT: CPT | Performed by: STUDENT IN AN ORGANIZED HEALTH CARE EDUCATION/TRAINING PROGRAM

## 2019-08-15 ASSESSMENT — VISUAL ACUITY
OS_SC+: -1
OS_SC: 20/30
OD_SC: 20/20
METHOD: SNELLEN - LINEAR
OD_SC+: -3

## 2019-08-15 NOTE — PROGRESS NOTES
Current Eye Medications: Finished Keflex      Subjective:  Here for Right eye upper eyelid chalazion removal, but wants to know if she really needs it. Not bothering her or painful anymore. The pus came out of the top of the lid one day after she picked the skin.  She feels that the warm compresses made it worse.  Will make an appointment for complete exam and glasses after this heals up.       Objective:  See Ophthalmology Exam.       Assessment:  Rosemarie Michel is a 49 year old female who presents with:   Encounter Diagnosis   Name Primary?     Chalazion of right upper eyelid Monitor. Patient does not wish to have incision and drainage today.        Plan:  Continue hot packing until chalazion resolves.    Regina Wilson MD  [phone]

## 2019-08-15 NOTE — LETTER
8/15/2019         RE: Rosemarie Michel  [address] [facility] [address]        Dear Colleague,    Thank you for referring your patient, Rosemarie Michel, to the [facility]. Please see a copy of my visit note below.     Current Eye Medications: Finished Keflex      Subjective:  Here for Right eye upper eyelid chalazion removal, but wants to know if she really needs it. Not bothering her or painful anymore. The pus came out of the top of the lid one day after she picked the skin.  She feels that the warm compresses made it worse.  Will make an appointment for complete exam and glasses after this heals up.       Objective:  See Ophthalmology Exam.       Assessment:  Rosemarie Michel is a 49 year old female who presents with:   Encounter Diagnosis   Name Primary?     Chalazion of right upper eyelid Monitor. Patient does not wish to have incision and drainage today.        Plan:  Continue hot packing until chalazion resolves.    Regina Wilson MD  [phone]        Again, thank you for allowing me to participate in the care of your patient.        Sincerely,        Regina Wilson MD

## 2019-08-15 NOTE — PROGRESS NOTES
The following medication was given:     MEDICATION: Xylocaine  ROUTE: SQ  SITE: right upper eyelid  DOSE: 1ml  LOT #: [id number]  :  [employer]  EXPIRATION DATE:  04/20   NDC#: [id number]

## 2019-09-02 ASSESSMENT — SLIT LAMP EXAM - LIDS: COMMENTS: NORMAL

## 2019-09-02 ASSESSMENT — EXTERNAL EXAM - RIGHT EYE: OD_EXAM: NORMAL

## 2019-09-02 ASSESSMENT — EXTERNAL EXAM - LEFT EYE: OS_EXAM: NORMAL

## 2019-10-02 ENCOUNTER — OFFICE VISIT (OUTPATIENT)
Dept: OPHTHALMOLOGY | Facility: CLINIC | Age: 49
End: 2019-10-02
Payer: COMMERCIAL

## 2019-10-02 DIAGNOSIS — H00.11 CHALAZION OF RIGHT UPPER EYELID: Primary | ICD-10-CM

## 2019-10-02 PROBLEM — M79.18 MYOFASCIAL PAIN: Status: ACTIVE | Noted: 2018-08-20

## 2019-10-02 PROBLEM — G43.909 MIGRAINE: Status: ACTIVE | Noted: 2018-08-20

## 2019-10-02 PROBLEM — M26.629 ARTHRALGIA OF TEMPOROMANDIBULAR JOINT: Status: ACTIVE | Noted: 2018-08-20

## 2019-10-02 PROCEDURE — 67800 REMOVE EYELID LESION: CPT | Mod: E3 | Performed by: STUDENT IN AN ORGANIZED HEALTH CARE EDUCATION/TRAINING PROGRAM

## 2019-10-02 PROCEDURE — 99207 ZZC NO CHARGE LOS: CPT | Performed by: STUDENT IN AN ORGANIZED HEALTH CARE EDUCATION/TRAINING PROGRAM

## 2019-10-02 RX ORDER — HYDROCHLOROTHIAZIDE 12.5 MG/1
TABLET ORAL
Refills: 0 | COMMUNITY
Start: 2019-05-28 | End: 2020-05-12

## 2019-10-02 RX ORDER — ERYTHROMYCIN 5 MG/G
0.5 OINTMENT OPHTHALMIC 2 TIMES DAILY
Qty: 1 TUBE | Refills: 0 | Status: SHIPPED | OUTPATIENT
Start: 2019-10-02 | End: 2020-01-23

## 2019-10-02 ASSESSMENT — EXTERNAL EXAM - RIGHT EYE: OD_EXAM: NORMAL

## 2019-10-02 ASSESSMENT — REFRACTION_WEARINGRX
OD_CYLINDER: SPHERE
OS_SPHERE: +1.25
SPECS_TYPE: OTC READERS
OS_CYLINDER: SPHERE
OD_SPHERE: +1.25

## 2019-10-02 ASSESSMENT — VISUAL ACUITY
OD_SC: 20/20
OS_SC: 20/30
METHOD: SNELLEN - LINEAR
OS_SC+: -1
OD_SC+: -1

## 2019-10-02 ASSESSMENT — SLIT LAMP EXAM - LIDS: COMMENTS: NORMAL

## 2019-10-02 ASSESSMENT — EXTERNAL EXAM - LEFT EYE: OS_EXAM: NORMAL

## 2019-10-02 NOTE — LETTER
10/2/2019         RE: Rosemarie Michel  [address]  [facility] [address]        Dear Colleague,    Thank you for referring your patient, Rosemarie Michel, to the [facility]. Please see a copy of my visit note below.     Current Eye Medications:  Hot packing     Subjective:  Here for Right upper eyelid chalazion removal. Consent signed     Objective:  See Ophthalmology Exam.       Assessment:  Rosemarie Michel is a 49 year old female who presents with:   Encounter Diagnosis   Name Primary?     Chalazion of right upper eyelid Incised and drained anteriorly without complication today.        Plan:  Use erythromycin ointment twice a day for 1 week    Regina Wilson MD  [phone]           Again, thank you for allowing me to participate in the care of your patient.        Sincerely,        Regina Wilson MD

## 2019-10-02 NOTE — PROGRESS NOTES
Current Eye Medications:  Hot packing     Subjective:  Here for Right upper eyelid chalazion removal. Consent signed     Objective:  See Ophthalmology Exam.       Assessment:  Rosemarie Michel is a 49 year old female who presents with:   Encounter Diagnosis   Name Primary?     Chalazion of right upper eyelid Incised and drained anteriorly without complication today.        Plan:  Use erythromycin ointment twice a day for 1 week    Regina Wilson MD  [phone]

## 2019-11-04 ENCOUNTER — HEALTH MAINTENANCE LETTER (OUTPATIENT)
Age: 49
End: 2019-11-04

## 2019-11-07 ENCOUNTER — OFFICE VISIT (OUTPATIENT)
Dept: FAMILY MEDICINE | Facility: CLINIC | Age: 49
End: 2019-11-07
Payer: COMMERCIAL

## 2019-11-07 VITALS
OXYGEN SATURATION: 98 % | DIASTOLIC BLOOD PRESSURE: 72 MMHG | HEIGHT: 64 IN | TEMPERATURE: 98.9 F | WEIGHT: 191 LBS | SYSTOLIC BLOOD PRESSURE: 128 MMHG | BODY MASS INDEX: 32.61 KG/M2 | HEART RATE: 101 BPM

## 2019-11-07 DIAGNOSIS — I10 HYPERTENSION GOAL BP (BLOOD PRESSURE) < 140/90: ICD-10-CM

## 2019-11-07 DIAGNOSIS — Z90.710 S/P HYSTERECTOMY: ICD-10-CM

## 2019-11-07 DIAGNOSIS — F43.23 ADJUSTMENT DISORDER WITH MIXED ANXIETY AND DEPRESSED MOOD: ICD-10-CM

## 2019-11-07 DIAGNOSIS — F17.200 TOBACCO USE DISORDER: Primary | ICD-10-CM

## 2019-11-07 PROCEDURE — 99213 OFFICE O/P EST LOW 20 MIN: CPT | Performed by: PHYSICIAN ASSISTANT

## 2019-11-07 ASSESSMENT — PAIN SCALES - GENERAL: PAINLEVEL: SEVERE PAIN (6)

## 2019-11-07 ASSESSMENT — MIFFLIN-ST. JEOR: SCORE: 1478.43

## 2019-11-07 NOTE — PROGRESS NOTES
"Subjective     Rosemarie Michel is a 49 year old female who presents to clinic today for the following health issues:    HPI   -She had some x-rays at her chiropractor due to some neck stuff.     -He noticed some calcification on an artery and told her to bring a copy of the x-rays to her primary to talk it over. She is feeling okay and not noticing anything.       Was having pain in her neck so chiropractor did any xray  Incidentally noted what he thought was calcification in the carotid and was told to follow-up with her PCP  Has xray with her    Doing well otherwise  Has had a good year  Feeling good after her hysterectomy  Blood pressures have been well controlled  She is trying to watch her diet and exercise more   Still smoking     Reviewed and updated as needed this visit by Provider         Review of Systems   Remainder of ROS obtained and found to be negative other than that which was documented above        Objective    /72   Pulse 101   Temp 98.9  F (37.2  C) (Tympanic)   Ht 1.613 m (5' 3.5\")   Wt 86.6 kg (191 lb)   LMP 04/16/2019   SpO2 98%   BMI 33.30 kg/m    Body mass index is 33.3 kg/m .  Physical Exam   GENERAL: healthy, alert and no distress  CV: regular rate and rhythm, normal S1 S2, no S3 or S4, no murmur, click or rub, no peripheral edema and peripheral pulses strong  PSYCH: mentation appears normal, affect normal/bright    Diagnostic Test Results:  Labs reviewed in Epic        Assessment & Plan       ICD-10-CM    1. Tobacco use disorder F17.200    2. Hypertension goal BP (blood pressure) < 140/90 I10    3. Adjustment disorder with mixed anxiety and depressed mood F43.23    4. S/P hysterectomy Z90.710      Reviewed images with patient. Given location of area of calcification on lateral view it does not look to be related to the carotids and suspect more of a calcification or deposit with in the neck or cartilage in neck?   Discussed this with patient and that in general focusing on " cholesterol and quitting smoking would be the recommendations to manage. Discussed the ASCVD risk score specifically demonstrating how much the risk is reduced by just taking smoking out of the picture.         Return in about 1 year (around 11/7/2020) for Physical Exam.    Mervat Ulloa PA-C  [facility]

## 2020-01-07 ENCOUNTER — OFFICE VISIT (OUTPATIENT)
Dept: FAMILY MEDICINE | Facility: CLINIC | Age: 50
End: 2020-01-07
Payer: COMMERCIAL

## 2020-01-07 ENCOUNTER — ANCILLARY PROCEDURE (OUTPATIENT)
Dept: GENERAL RADIOLOGY | Facility: CLINIC | Age: 50
End: 2020-01-07
Attending: FAMILY MEDICINE
Payer: COMMERCIAL

## 2020-01-07 VITALS
WEIGHT: 188.1 LBS | SYSTOLIC BLOOD PRESSURE: 124 MMHG | DIASTOLIC BLOOD PRESSURE: 86 MMHG | TEMPERATURE: 98.1 F | HEIGHT: 63 IN | OXYGEN SATURATION: 98 % | BODY MASS INDEX: 33.33 KG/M2 | HEART RATE: 95 BPM

## 2020-01-07 DIAGNOSIS — J20.9 ACUTE BRONCHITIS, UNSPECIFIED ORGANISM: Primary | ICD-10-CM

## 2020-01-07 DIAGNOSIS — R05.9 COUGH: ICD-10-CM

## 2020-01-07 DIAGNOSIS — F17.200 TOBACCO USE DISORDER: ICD-10-CM

## 2020-01-07 PROCEDURE — 71046 X-RAY EXAM CHEST 2 VIEWS: CPT | Mod: FY

## 2020-01-07 PROCEDURE — 99214 OFFICE O/P EST MOD 30 MIN: CPT | Performed by: FAMILY MEDICINE

## 2020-01-07 RX ORDER — PREDNISONE 20 MG/1
TABLET ORAL
Qty: 11 TABLET | Refills: 0 | Status: SHIPPED | OUTPATIENT
Start: 2020-01-07 | End: 2020-01-23

## 2020-01-07 RX ORDER — AZITHROMYCIN 250 MG/1
TABLET, FILM COATED ORAL
Qty: 6 TABLET | Refills: 0 | Status: SHIPPED | OUTPATIENT
Start: 2020-01-07 | End: 2020-01-23

## 2020-01-07 RX ORDER — CODEINE PHOSPHATE AND GUAIFENESIN 10; 100 MG/5ML; MG/5ML
1 SOLUTION ORAL EVERY 4 HOURS PRN
Qty: 118 ML | Refills: 0 | Status: SHIPPED | OUTPATIENT
Start: 2020-01-07 | End: 2020-01-23

## 2020-01-07 ASSESSMENT — MIFFLIN-ST. JEOR: SCORE: 1457.35

## 2020-01-07 NOTE — PROGRESS NOTES
SUBJECTIVE:                                                    Rosemraie Michel is a 49 year old female who presents to clinic today for the following health issues:    ENT Symptoms             Symptoms: cc Present Absent Comment   Fever/Chills  x     Fatigue  x     Muscle Aches  x     Eye Irritation  x     Sneezing  x     Nasal Eric/Drg  x     Sinus Pressure/Pain  x     Loss of smell   x    Dental pain  x  Slight    Sore Throat  x     Swollen Glands   x    Ear Pain/Fullness  x  Slight    Cough  x     Wheeze   x    Chest Pain  x  Chest congestion - coughed a large amount of bloody phlegm    Shortness of breath  x     Rash   x    Other   x      Symptom duration:  since after chris    Symptom severity:  moderate    Treatments tried:  Theraflu,sudafed, halls cough drops, Robitussin, Tylenol, Advil    Contacts:  son      Cough ongoing for over a week  Facial congestion  Hot/cold - resolved    Sore throat - improved   Some chest congestion - coughed up bloody mucous recently    Tobacco use - 6-7/day     No asthma/allergies     No flu shot this year     review of systems:   No f/c   No headache  No weight loss or night sweats  No n/v         Problem list and histories reviewed & adjusted, as indicated.  Additional history: as documented     Patient Active Problem List   Diagnosis     Hyperlipidemia LDL goal <160     Excessive bleeding in premenopausal period     BPPV (benign paroxysmal positional vertigo)     Hypertension goal BP (blood pressure) < 140/90     Tobacco use disorder     Myofascial pain     Migraine     Arthralgia of temporomandibular joint     Current Outpatient Medications   Medication     acetaminophen (TYLENOL) 500 MG tablet     citalopram (CELEXA) 10 MG tablet     hydrochlorothiazide (HYDRODIURIL) 12.5 MG tablet     ibuprofen (ADVIL/MOTRIN) 200 MG tablet     erythromycin (ROMYCIN) 5 MG/GM ophthalmic ointment     hydrochlorothiazide (HYDRODIURIL) 25 MG tablet     No current facility-administered  "medications for this visit.         Allergies   Allergen Reactions     Latex      Shellfish Allergy        OBJECTIVE:                                                    /86   Pulse 95   Temp 98.1  F (36.7  C) (Tympanic)   Ht 1.6 m (5' 3\")   Wt 85.3 kg (188 lb 1.6 oz)   LMP 04/16/2019   SpO2 98%   BMI 33.32 kg/m   Body mass index is 33.32 kg/m .   BP Readings from Last 6 Encounters:   01/07/20 (!) 141/91   11/07/19 128/72   07/24/19 122/82   05/01/19 130/78   04/18/19 114/71   02/19/19 122/78       GENERAL - Pt is alert and oriented in no acute distress.  Affect is appropriate. Good eye contact.  HEET - Head is normocephalic, atraumatic.    PERRLA,EEMI. Conjunctiva are free of icterus or erythema.    TMs bilaterally normal.   Oropharynx free of masses and lesions, no tonsillar exudate or petechiae.    NECK - Neck is supple w/o LA or thyromegaly  RESPIRATORY - Clear to auscultation bilaterally.  No wheezing noted  CV - RRR, no murmurs, rubs, gallops.     chest x-ray   I independently visualized the xray and my findings were right middle lobe infiltrate.  No old cxr for comparison  Radiology review pending.         ASSESSMENT/PLAN:                                                      (J20.9) Acute bronchitis, unspecified organism  (primary encounter diagnosis)  Comment: discussed diagnosis. Will treat for infectious etiology with zpak and inflammation with prednisone - Discussed risks/benefits/side effects with the patient.  I also gave her cough syrup with codeine - Discussed risks/benefits/side effects with the patient. Return to clinic if symptoms persist/change/worsen. The patient indicates understanding of these issues and agrees with the plan.   Plan: azithromycin (ZITHROMAX) 250 MG tablet,         predniSONE (DELTASONE) 20 MG tablet,         guaiFENesin-codeine (ROBITUSSIN AC) 100-10         MG/5ML solution            (R05) Cough  Comment:  Plan: XR Chest 2 Views            (F17.200) Tobacco use " disorder  Comment: discussed cessation. She is contemplating this.   Plan:       MERON Ambriz MD   [facility]

## 2020-01-23 ENCOUNTER — OFFICE VISIT (OUTPATIENT)
Dept: FAMILY MEDICINE | Facility: CLINIC | Age: 50
End: 2020-01-23
Payer: COMMERCIAL

## 2020-01-23 VITALS
HEIGHT: 63 IN | BODY MASS INDEX: 33.66 KG/M2 | SYSTOLIC BLOOD PRESSURE: 134 MMHG | DIASTOLIC BLOOD PRESSURE: 72 MMHG | TEMPERATURE: 98.3 F | WEIGHT: 190 LBS | OXYGEN SATURATION: 98 % | HEART RATE: 94 BPM

## 2020-01-23 DIAGNOSIS — H69.93 DYSFUNCTION OF BOTH EUSTACHIAN TUBES: Primary | ICD-10-CM

## 2020-01-23 DIAGNOSIS — L60.0 INGROWN TOENAIL OF LEFT FOOT: ICD-10-CM

## 2020-01-23 PROCEDURE — 99214 OFFICE O/P EST MOD 30 MIN: CPT | Performed by: PHYSICIAN ASSISTANT

## 2020-01-23 ASSESSMENT — MIFFLIN-ST. JEOR: SCORE: 1465.96

## 2020-01-23 ASSESSMENT — PAIN SCALES - GENERAL: PAINLEVEL: NO PAIN (0)

## 2020-01-23 NOTE — PROGRESS NOTES
"Subjective     Rosemarie Michel is a 49 year old female who presents to clinic today for the following health issues:    HPI   Patient is here today to follow up with Bronchitis. She is slowly feeling better. Her left ear is bothering her and wants to just make sure she is not getting an infection.    -Her left big toe is bothering her and she is thinking she might have an ingrown toenail. Has tried otc things in the past and has seen a podiatrist before.       Feeling better - treated for bronchitis in early Jan  Does feel like she is improved but still with occasional pain in her left ear so just wants to make sure it is not infected    Has been struggling with her left great toe   Was seen a couple years ago by podiatry and told to put vicks on her toe which she did along with soaking, alcohol, peroxide and antifungal medications  Nothing has seemed to help  Toe can be painful especially if bumped or irritated  Only wears tennis shoes as \"fancy\" shoes hurt her toes too much    BP Readings from Last 3 Encounters:   01/23/20 134/72   01/07/20 124/86   11/07/19 128/72    Wt Readings from Last 3 Encounters:   01/23/20 86.2 kg (190 lb)   01/07/20 85.3 kg (188 lb 1.6 oz)   11/07/19 86.6 kg (191 lb)                      Reviewed and updated as needed this visit by Provider         Review of Systems   Remainder of ROS obtained and found to be negative other than that which was documented above        Objective    /72   Pulse 94   Temp 98.3  F (36.8  C) (Tympanic)   Ht 1.6 m (5' 3\")   Wt 86.2 kg (190 lb)   LMP 04/16/2019   SpO2 98%   BMI 33.66 kg/m    Body mass index is 33.66 kg/m .  Physical Exam   GENERAL: healthy, alert and no distress  EYES: Eyes grossly normal to inspection  HENT: ear canals and TM's normal, nose and mouth without ulcers or lesions  RESP: lungs clear to auscultation - no rales, rhonchi or wheezes  TOE, left great:   Red around edges with mild edema. Nail thick and yellow down to base " with marked curvature causing ingrowing nail on both medial and lateral nailbeds  Slight deformity to great toe causing it to deviate laterally which then causes constant pressure against 2nd digit    Diagnostic Test Results:  none         Assessment & Plan     (H69.83) Dysfunction of both eustachian tubes  (primary encounter diagnosis)  Comment: some residual fluid but no redness  Plan: sudafed prn, flonase nasal spray prn. Otherwise expect with more time, symptoms will resolve on their own    (L60.0) Ingrown toenail of left foot  Comment: ingrowing nail with thickened nail and slight deformity to great toe causing constant pressure between digits 1&2 which is likely contributing to ongoing issue as well  Plan: PODIATRY/FOOT & ANKLE SURGERY REFERRAL              Return in about 4 weeks (around 2/20/2020) for With specialist.    Mervat Ulloa PA-C  [facility]

## 2020-02-06 ENCOUNTER — TRANSFERRED RECORDS (OUTPATIENT)
Dept: HEALTH INFORMATION MANAGEMENT | Facility: CLINIC | Age: 50
End: 2020-02-06

## 2020-04-23 ENCOUNTER — TRANSFERRED RECORDS (OUTPATIENT)
Dept: HEALTH INFORMATION MANAGEMENT | Facility: CLINIC | Age: 50
End: 2020-04-23

## 2020-05-07 ENCOUNTER — TRANSFERRED RECORDS (OUTPATIENT)
Dept: HEALTH INFORMATION MANAGEMENT | Facility: CLINIC | Age: 50
End: 2020-05-07

## 2020-09-17 ENCOUNTER — TRANSFERRED RECORDS (OUTPATIENT)
Dept: HEALTH INFORMATION MANAGEMENT | Facility: CLINIC | Age: 50
End: 2020-09-17

## 2020-09-21 ENCOUNTER — E-VISIT (OUTPATIENT)
Dept: FAMILY MEDICINE | Facility: CLINIC | Age: 50
End: 2020-09-21
Payer: COMMERCIAL

## 2020-09-21 DIAGNOSIS — M54.16 LUMBAR RADICULOPATHY: Primary | ICD-10-CM

## 2020-09-21 PROCEDURE — 99421 OL DIG E/M SVC 5-10 MIN: CPT | Performed by: PHYSICIAN ASSISTANT

## 2020-10-01 ENCOUNTER — OFFICE VISIT (OUTPATIENT)
Dept: NEUROSURGERY | Facility: CLINIC | Age: 50
End: 2020-10-01
Payer: COMMERCIAL

## 2020-10-01 VITALS
BODY MASS INDEX: 33.13 KG/M2 | OXYGEN SATURATION: 96 % | SYSTOLIC BLOOD PRESSURE: 156 MMHG | HEIGHT: 63 IN | HEART RATE: 97 BPM | WEIGHT: 187 LBS | TEMPERATURE: 98.6 F | DIASTOLIC BLOOD PRESSURE: 92 MMHG

## 2020-10-01 DIAGNOSIS — M54.16 LUMBAR RADICULOPATHY: ICD-10-CM

## 2020-10-01 PROCEDURE — 99203 OFFICE O/P NEW LOW 30 MIN: CPT | Performed by: NEUROLOGICAL SURGERY

## 2020-10-01 ASSESSMENT — MIFFLIN-ST. JEOR: SCORE: 1451.32

## 2020-10-01 ASSESSMENT — PAIN SCALES - GENERAL: PAINLEVEL: MODERATE PAIN (4)

## 2020-10-01 NOTE — LETTER
10/1/2020         RE: Rosemarie Michel  [address]  [place] [address]        Dear Colleague,    Thank you for referring your patient, Rosemarie Michel, to the [facility]. Please see a copy of my visit note below.    I was asked by Dr. Ulloa to see this patient in consultation    50F w/ lumbar stenosis, back and left leg pain.  Many years of chronic back pain, treated with chiropractic care.  Now with 3 months of 7/10 throbbing low back pain, with sharp radiation to the posterolateral thigh, shin, dorsal foot, and great toe.  No improvement with continued chiropractic care.  MR with L4-5 severe central and L5-S1 bilateral lateral recess stenosis.       Past Medical History:   Diagnosis Date     Abnormal Pap smear 2000    , normal paps since     BPPV (benign paroxysmal positional vertigo)      Cervical disc disorder      Chalazion of right upper eyelid      DUB (dysfunctional uterine bleeding)      Hyperlipidemia      Mastodynia sept 2014    left breast  tenderness     Menorrhagia      Motion sickness      Pregnancy induced hypertension      Ureteral duplication, bilateral 2019    noted during cystoscopy at time of hysterectomy     Past Surgical History:   Procedure Laterality Date     AS HYSTEROSCOPY, SURGICAL; W/ ENDOMETRIAL ABLATION, ANY METHOD       CYSTOSCOPY N/A 4/18/2019    Procedure: CYSTOSCOPY;  Surgeon: Yolande Jackson MD;  Location:  OR     GYN SURGERY      LEEP, c. section     HYSTERECTOMY  04/18/2019     LAPAROSCOPIC HYSTERECTOMY SUPRACERVICAL N/A 4/18/2019    Procedure: LAPAROSCOPIC SUPRACERVICAL HYSTERECTOMY,POWERED MORCELLATION IN A BAG (LATEX ALLERGY: CONTACT ALLERGY);  Surgeon: Yolande Jackson MD;  Location:  OR     LAPAROSCOPIC SALPINGECTOMY Bilateral 4/18/2019    Procedure: BILATERAL LAPAROSCOPIC SALPINGECTOMY;  Surgeon: Yolande Jackson MD;  Location:  OR     Social History     Socioeconomic History     Marital status:       Spouse name: Phan     Number of children: 1     Years of education: Not on file     Highest education level: Not on file   Occupational History     Employer: [id number]   Social Needs     Financial resource strain: Not on file     Food insecurity     Worry: Not on file     Inability: Not on file     Transportation needs     Medical: Not on file     Non-medical: Not on file   Tobacco Use     Smoking status: Current Every Day Smoker     Packs/day: 0.25     Years: 21.00     Pack years: 5.25     Types: Cigarettes     Smokeless tobacco: Never Used     Tobacco comment: 5-6 cigs per day   Substance and Sexual Activity     Alcohol use: Yes     Alcohol/week: 0.0 standard drinks     Comment:  5 Beers Through Whole Pregnancy     Drug use: No     Sexual activity: Yes     Partners: Male     Birth control/protection: Condom   Lifestyle     Physical activity     Days per week: Not on file     Minutes per session: Not on file     Stress: Not on file   Relationships     Social connections     Talks on phone: Not on file     Gets together: Not on file     Attends Pentecostal service: Not on file     Active member of club or organization: Not on file     Attends meetings of clubs or organizations: Not on file     Relationship status: Not on file     Intimate partner violence     Fear of current or ex partner: Not on file     Emotionally abused: Not on file     Physically abused: Not on file     Forced sexual activity: Not on file   Other Topics Concern     Parent/sibling w/ CABG, MI or angioplasty before 65F 55M? No   Social History Narrative     Not on file     Family History   Problem Relation Age of Onset     Diabetes Mother 57        type 2     Hyperlipidemia Mother      Breast Cancer Mother      Diabetes Father 60        type 2     Cancer Father 62        bladder     Hyperlipidemia Father      Diabetes Brother 42        type 2     Hyperlipidemia Paternal Grandmother         ROS: 10 point ROS neg other than the symptoms  noted above in the HPI.    Physical Exam  LMP 04/16/2019   HEENT:  Normocephalic, atraumatic.  PERRLA.  EOM s intact.  Visual fields full to gross exam  Neck:  Supple, non-tender, without lymphadenopathy.  Heart:  No peripheral edema  Lungs:  No SOB  Abdomen:  Non-distended.   Skin:  Warm and dry.  Extremities:  No edema, cyanosis or clubbing.  Psychiatric:  No apparent distress  Musculoskeletal:  Normal bulk and tone    NEUROLOGICAL EXAMINATION:     Mental status:  Alert and Oriented x 3, speech is fluent.  Cranial nerves:  II-XII intact.   Motor:    Shoulder Abduction:  Right:  5/5   Left:  5/5  Biceps:                      Right:  5/5   Left:  5/5  Triceps:                     Right:  5/5   Left:  5/5  Wrist Extensors:       Right:  5/5   Left:  5/5  Wrist Flexors:           Right:  5/5   Left:  5/5  interosseus :            Right:  5/5   Left:  5/5  Hip Flexion:                Right: 5/5  Left:  5/5  Quadriceps:             Right:  5/5  Left:  5/5  Hamstrings:             Right:  5/5  Left:  5/5  Gastroc Soleus:        Right:  5/5  Left:  5/5  Tib/Ant:                      Right:  5/5  Left:  5/5  EHL:                     Right:  5/5  Left:  5/5  Sensation:  Intact  Reflexes:  Negative Babinski.  Negative Clonus.  Negative Acosta's.  Coordination:  Smooth finger to nose testing.   Negative pronator drift.  Smooth tandem walking.    A/P:  50F w/ lumbar stenosis, back and left leg pain    I had a discussion with the patient, reviewing the history, symptoms, and imaging  Plan for L5-S1 NICKIE         Again, thank you for allowing me to participate in the care of your patient.        Sincerely,        Steve Whitehead MD

## 2020-10-01 NOTE — PROGRESS NOTES
I was asked by Dr. Ulloa to see this patient in consultation    50F w/ lumbar stenosis, back and left leg pain.  Many years of chronic back pain, treated with chiropractic care.  Now with 3 months of 7/10 throbbing low back pain, with sharp radiation to the posterolateral thigh, shin, dorsal foot, and great toe.  No improvement with continued chiropractic care.  MR with L4-5 severe central and L5-S1 bilateral lateral recess stenosis.       Past Medical History:   Diagnosis Date     Abnormal Pap smear 2000    , normal paps since     BPPV (benign paroxysmal positional vertigo)      Cervical disc disorder      Chalazion of right upper eyelid      DUB (dysfunctional uterine bleeding)      Hyperlipidemia      Mastodynia sept 2014    left breast  tenderness     Menorrhagia      Motion sickness      Pregnancy induced hypertension      Ureteral duplication, bilateral 2019    noted during cystoscopy at time of hysterectomy     Past Surgical History:   Procedure Laterality Date     AS HYSTEROSCOPY, SURGICAL; W/ ENDOMETRIAL ABLATION, ANY METHOD       CYSTOSCOPY N/A 4/18/2019    Procedure: CYSTOSCOPY;  Surgeon: Yolande Jackson MD;  Location:  OR     GYN SURGERY      LEEP, c. section     HYSTERECTOMY  04/18/2019     LAPAROSCOPIC HYSTERECTOMY SUPRACERVICAL N/A 4/18/2019    Procedure: LAPAROSCOPIC SUPRACERVICAL HYSTERECTOMY,POWERED MORCELLATION IN A BAG (LATEX ALLERGY: CONTACT ALLERGY);  Surgeon: Yolande Jackson MD;  Location:  OR     LAPAROSCOPIC SALPINGECTOMY Bilateral 4/18/2019    Procedure: BILATERAL LAPAROSCOPIC SALPINGECTOMY;  Surgeon: Yolande Jackson MD;  Location:  OR     Social History     Socioeconomic History     Marital status:      Spouse name: Phan     Number of children: 1     Years of education: Not on file     Highest education level: Not on file   Occupational History     Employer: [employer]   Social Needs     Financial resource strain: Not on file     Food insecurity     Worry: Not on  file     Inability: Not on file     Transportation needs     Medical: Not on file     Non-medical: Not on file   Tobacco Use     Smoking status: Current Every Day Smoker     Packs/day: 0.25     Years: 21.00     Pack years: 5.25     Types: Cigarettes     Smokeless tobacco: Never Used     Tobacco comment: 5-6 cigs per day   Substance and Sexual Activity     Alcohol use: Yes     Alcohol/week: 0.0 standard drinks     Comment:  5 Beers Through Whole Pregnancy     Drug use: No     Sexual activity: Yes     Partners: Male     Birth control/protection: Condom   Lifestyle     Physical activity     Days per week: Not on file     Minutes per session: Not on file     Stress: Not on file   Relationships     Social connections     Talks on phone: Not on file     Gets together: Not on file     Attends Rastafarian service: Not on file     Active member of club or organization: Not on file     Attends meetings of clubs or organizations: Not on file     Relationship status: Not on file     Intimate partner violence     Fear of current or ex partner: Not on file     Emotionally abused: Not on file     Physically abused: Not on file     Forced sexual activity: Not on file   Other Topics Concern     Parent/sibling w/ CABG, MI or angioplasty before 65F 55M? No   Social History Narrative     Not on file     Family History   Problem Relation Age of Onset     Diabetes Mother 57        type 2     Hyperlipidemia Mother      Breast Cancer Mother      Diabetes Father 60        type 2     Cancer Father 62        bladder     Hyperlipidemia Father      Diabetes Brother 42        type 2     Hyperlipidemia Paternal Grandmother         ROS: 10 point ROS neg other than the symptoms noted above in the HPI.    Physical Exam  LMP 04/16/2019   HEENT:  Normocephalic, atraumatic.  PERRLA.  EOM s intact.  Visual fields full to gross exam  Neck:  Supple, non-tender, without lymphadenopathy.  Heart:  No peripheral edema  Lungs:  No SOB  Abdomen:  Non-distended.    Skin:  Warm and dry.  Extremities:  No edema, cyanosis or clubbing.  Psychiatric:  No apparent distress  Musculoskeletal:  Normal bulk and tone    NEUROLOGICAL EXAMINATION:     Mental status:  Alert and Oriented x 3, speech is fluent.  Cranial nerves:  II-XII intact.   Motor:    Shoulder Abduction:  Right:  5/5   Left:  5/5  Biceps:                      Right:  5/5   Left:  5/5  Triceps:                     Right:  5/5   Left:  5/5  Wrist Extensors:       Right:  5/5   Left:  5/5  Wrist Flexors:           Right:  5/5   Left:  5/5  interosseus :            Right:  5/5   Left:  5/5  Hip Flexion:                Right: 5/5  Left:  5/5  Quadriceps:             Right:  5/5  Left:  5/5  Hamstrings:             Right:  5/5  Left:  5/5  Gastroc Soleus:        Right:  5/5  Left:  5/5  Tib/Ant:                      Right:  5/5  Left:  5/5  EHL:                     Right:  5/5  Left:  5/5  Sensation:  Intact  Reflexes:  Negative Babinski.  Negative Clonus.  Negative Acosta's.  Coordination:  Smooth finger to nose testing.   Negative pronator drift.  Smooth tandem walking.    A/P:  50F w/ lumbar stenosis, back and left leg pain    I had a discussion with the patient, reviewing the history, symptoms, and imaging  Plan for L5-S1 NICKIE

## 2020-10-01 NOTE — PATIENT INSTRUCTIONS
Order placed for epidural steroid injection. The steroid can take 10-14 days to reach max effect. Please call our clinic if symptoms persist after this timeframe.    [facility] scheduling [phone]      [facility]   Phone: [phone]  Fax: [phone]

## 2020-10-01 NOTE — NURSING NOTE
"Rosemarie Michel is a 50 year old female who presents for:  Chief Complaint   Patient presents with     Back Pain     Lumbar radiculopathy        Initial Vitals:  BP (!) 156/92   Pulse 97   Temp 98.6  F (37  C)   Ht 5' 3.25\" (1.607 m)   Wt 187 lb (84.8 kg)   LMP 04/16/2019   SpO2 96%   BMI 32.86 kg/m   Estimated body mass index is 32.86 kg/m  as calculated from the following:    Height as of this encounter: 5' 3.25\" (1.607 m).    Weight as of this encounter: 187 lb (84.8 kg).. Body surface area is 1.95 meters squared. BP completed using cuff size: regular  Moderate Pain (4)    Nursing Comments: Patient presents w/ LBP    Clifford Chowdhury MA  "

## 2020-10-08 ENCOUNTER — TRANSFERRED RECORDS (OUTPATIENT)
Dept: HEALTH INFORMATION MANAGEMENT | Facility: CLINIC | Age: 50
End: 2020-10-08

## 2020-11-22 ENCOUNTER — HEALTH MAINTENANCE LETTER (OUTPATIENT)
Age: 50
End: 2020-11-22

## 2021-02-01 ENCOUNTER — E-VISIT (OUTPATIENT)
Dept: FAMILY MEDICINE | Facility: CLINIC | Age: 51
End: 2021-02-01
Payer: COMMERCIAL

## 2021-02-01 DIAGNOSIS — F43.23 ADJUSTMENT DISORDER WITH MIXED ANXIETY AND DEPRESSED MOOD: ICD-10-CM

## 2021-02-01 DIAGNOSIS — I10 HYPERTENSION GOAL BP (BLOOD PRESSURE) < 140/90: ICD-10-CM

## 2021-02-01 PROCEDURE — 99421 OL DIG E/M SVC 5-10 MIN: CPT | Performed by: PHYSICIAN ASSISTANT

## 2021-02-01 RX ORDER — HYDROCHLOROTHIAZIDE 25 MG/1
12.5 TABLET ORAL DAILY
Qty: 135 TABLET | Refills: 1 | Status: SHIPPED | OUTPATIENT
Start: 2021-02-01 | End: 2021-09-27

## 2021-02-01 RX ORDER — CITALOPRAM HYDROBROMIDE 10 MG/1
10 TABLET ORAL DAILY
Qty: 90 TABLET | Refills: 3 | Status: SHIPPED | OUTPATIENT
Start: 2021-02-01 | End: 2021-08-05

## 2021-02-15 ENCOUNTER — ANCILLARY PROCEDURE (OUTPATIENT)
Dept: MAMMOGRAPHY | Facility: CLINIC | Age: 51
End: 2021-02-15
Payer: COMMERCIAL

## 2021-02-15 DIAGNOSIS — Z12.31 VISIT FOR SCREENING MAMMOGRAM: ICD-10-CM

## 2021-02-15 PROCEDURE — 77067 SCR MAMMO BI INCL CAD: CPT | Mod: TC | Performed by: RADIOLOGY

## 2021-04-08 ENCOUNTER — TRANSFERRED RECORDS (OUTPATIENT)
Dept: HEALTH INFORMATION MANAGEMENT | Facility: CLINIC | Age: 51
End: 2021-04-08

## 2021-05-25 ENCOUNTER — TRANSFERRED RECORDS (OUTPATIENT)
Dept: HEALTH INFORMATION MANAGEMENT | Facility: CLINIC | Age: 51
End: 2021-05-25

## 2021-07-06 ENCOUNTER — OFFICE VISIT (OUTPATIENT)
Dept: FAMILY MEDICINE | Facility: CLINIC | Age: 51
End: 2021-07-06
Payer: COMMERCIAL

## 2021-07-06 VITALS
TEMPERATURE: 98.3 F | SYSTOLIC BLOOD PRESSURE: 132 MMHG | DIASTOLIC BLOOD PRESSURE: 82 MMHG | HEART RATE: 86 BPM | OXYGEN SATURATION: 96 %

## 2021-07-06 DIAGNOSIS — J06.9 UPPER RESPIRATORY TRACT INFECTION, UNSPECIFIED TYPE: Primary | ICD-10-CM

## 2021-07-06 LAB
SARS-COV-2 RNA RESP QL NAA+PROBE: NORMAL
SPECIMEN SOURCE: NORMAL

## 2021-07-06 PROCEDURE — U0003 INFECTIOUS AGENT DETECTION BY NUCLEIC ACID (DNA OR RNA); SEVERE ACUTE RESPIRATORY SYNDROME CORONAVIRUS 2 (SARS-COV-2) (CORONAVIRUS DISEASE [COVID-19]), AMPLIFIED PROBE TECHNIQUE, MAKING USE OF HIGH THROUGHPUT TECHNOLOGIES AS DESCRIBED BY CMS-2020-01-R: HCPCS | Performed by: PHYSICIAN ASSISTANT

## 2021-07-06 PROCEDURE — 99213 OFFICE O/P EST LOW 20 MIN: CPT | Performed by: PHYSICIAN ASSISTANT

## 2021-07-06 PROCEDURE — U0005 INFEC AGEN DETEC AMPLI PROBE: HCPCS | Performed by: PHYSICIAN ASSISTANT

## 2021-07-06 ASSESSMENT — PAIN SCALES - GENERAL: PAINLEVEL: MODERATE PAIN (5)

## 2021-07-06 NOTE — PROGRESS NOTES
"    Assessment & Plan     (J06.9) Upper respiratory tract infection, unspecified type  (primary encounter diagnosis)  Comment: 3+ weeks of symptoms. Did obtain a COVID but suspect it will be negative. I think this is either a viral URI or allergies +/- viral URI given symptoms started 1-2 weeks after stopping her daily allergy medication  Plan: amoxicillin-clavulanate (AUGMENTIN) 875-125 MG         tablet, Symptomatic COVID-19 Virus         (Coronavirus) by PCR, SARS-CoV-2 COVID-19 Virus        (Coronavirus) by PCR          Given duration of symptoms, augmentin prescribed.   Advised she restart her allergy medications and add flonase to help with the eustachian tube dysfunction which I think is the cause of her ear pain      Tobacco Cessation:   reports that she has been smoking cigarettes. She has a 5.25 pack-year smoking history. She has never used smokeless tobacco.    BMI:   Estimated body mass index is 32.86 kg/m  as calculated from the following:    Height as of 10/1/20: 1.607 m (5' 3.25\").    Weight as of 10/1/20: 84.8 kg (187 lb).     Return in about 1 week (around 7/13/2021) for If not improving or worsening.    SKYLAR Valencia [facility] LENORE Houston is a 51 year old who presents for the following health issues     HPI     Acute Illness  Acute illness concerns: Ear Pain and Throat Pain  Onset/Duration: 3 weeks   Symptoms:  Fever: no  Chills/Sweats: no  Headache (location?): no  Sinus Pressure: no  Conjunctivitis:  no  Ear Pain: YES: left  Rhinorrhea: no  Congestion: no  Sore Throat: YES- only the left side   Cough: no  Wheeze: no  Decreased Appetite: no  Nausea: no  Vomiting: no  Diarrhea: no  Dysuria/Freq.: no  Dysuria or Hematuria: no  Fatigue/Achiness: no  Sick/Strep Exposure: no  Therapies tried and outcome: Tylenol an Advil     Neither her son or  have been sick or gotten sick since she has been   Did stop her allergy medication (claritin) about 1 month ago " because she didn't know if she really needed it - has just taken it to take it for so long she thought maybe she didn't really need it    Review of Systems   Remainder of ROS obtained and found to be negative other than that which was documented above        Objective    /82   Pulse 86   Temp 98.3  F (36.8  C) (Tympanic)   LMP 04/16/2019   SpO2 96%   There is no height or weight on file to calculate BMI.  Physical Exam   GENERAL: healthy, alert and no distress  EYES: Eyes grossly normal to inspection  HENT: ear canals and TM's normal, nose and mouth without ulcers or lesions  NECK: no adenopathy  RESP: lungs clear to auscultation - no rales, rhonchi or wheezes  CV: regular rates and rhythm, normal S1 S2, no S3 or S4 and no murmur, click or rub    Diagnostic Tests:   COVID test obtained in clinic - pending

## 2021-07-07 LAB
LABORATORY COMMENT REPORT: NORMAL
SARS-COV-2 RNA RESP QL NAA+PROBE: NEGATIVE
SPECIMEN SOURCE: NORMAL

## 2021-08-05 ENCOUNTER — VIRTUAL VISIT (OUTPATIENT)
Dept: FAMILY MEDICINE | Facility: CLINIC | Age: 51
End: 2021-08-05
Payer: COMMERCIAL

## 2021-08-05 DIAGNOSIS — F43.23 ADJUSTMENT DISORDER WITH MIXED ANXIETY AND DEPRESSED MOOD: Primary | ICD-10-CM

## 2021-08-05 DIAGNOSIS — Z91.030 BEE STING ALLERGY: ICD-10-CM

## 2021-08-05 PROCEDURE — 99214 OFFICE O/P EST MOD 30 MIN: CPT | Mod: 95 | Performed by: PHYSICIAN ASSISTANT

## 2021-08-05 RX ORDER — CITALOPRAM HYDROBROMIDE 10 MG/1
20 TABLET ORAL DAILY
Qty: 90 TABLET | Refills: 3 | COMMUNITY
Start: 2021-08-05 | End: 2021-09-20

## 2021-08-05 RX ORDER — EPINEPHRINE 0.3 MG/.3ML
0.3 INJECTION SUBCUTANEOUS ONCE
Qty: 0.3 ML | Refills: 3 | Status: SHIPPED | OUTPATIENT
Start: 2021-08-05 | End: 2021-08-05

## 2021-08-05 NOTE — PROGRESS NOTES
"Rosemarie is a 51 year old who is being evaluated via a billable telephone visit.      What phone number would you like to be contacted at? [phone]  How would you like to obtain your AVS? [facility]    Assessment & Plan     (F43.23) Adjustment disorder with mixed anxiety and depressed mood  (primary encounter diagnosis)  Comment: only on 10mg. Discussed trial of higher dose (20mg). To check back with me in 3-4 weeks if this dose change has helped  Plan: citalopram (CELEXA) 10 MG tablet            (Z91.030) Bee sting allergy  Comment: has not had any on hand because of cost. Knows she would feel better knowing she had it. H/o anaphylactic reaction  Plan: EPINEPHrine (ANY BX GENERIC EQUIV) 0.3 MG/0.3ML        injection 2-pack                  Tobacco Cessation:   reports that she has been smoking cigarettes. She has a 5.25 pack-year smoking history. She has never used smokeless tobacco.    BMI:   Estimated body mass index is 32.86 kg/m  as calculated from the following:    Height as of 10/1/20: 1.607 m (5' 3.25\").    Weight as of 10/1/20: 84.8 kg (187 lb).     Return in about 4 weeks (around 9/2/2021) for follow up  medication/dose change.    Mervat Ulloa PA-C  [facility]   Rosemarie is a 51 year old who presents for the following health issues   HPI     Medication Followup of Citalopram 10 mg tablet     Taking Medication as prescribed: yes    Side Effects:  None    Medication Helping Symptoms:  NO-she does not think its helping at all and maybe wanting to increase the dose     -She also would like to discuss getting an Epipen.     Doing \"pretty good\"   Work is crazy but that's good  Has been on the 10mg now for about a year  Not sure it is doing anything  No side effect so she is happy with that and nervous about making a change      Used to have an epi pen for bee allergy  When she was little she didn't have any issues but then in her teens/20's she was given them   She would break out " in hives and her skin would swell, eyes would swell, couldn't breathe  Exact same reaction that she has to shellfish   has been really on her because the wasps/hornets have been really bad  Hasn't been outside or in the pool much at all this summer because she is worried about the possibility of stung    Has been periodically checking her blood pressure - they have been really good      Review of Systems   Remainder of ROS obtained and found to be negative other than that which was documented above        Objective           Vitals:  No vitals were obtained today due to virtual visit.    Physical Exam   healthy, alert and no distress  PSYCH: Alert and oriented times 3; coherent speech, normal   rate and volume, able to articulate logical thoughts, able   to abstract reason, no tangential thoughts, no hallucinations   or delusions  Her affect is normal  RESP: No cough, no audible wheezing, able to talk in full sentences  Remainder of exam unable to be completed due to telephone visits              Phone call duration: 16 minutes

## 2021-08-30 NOTE — MR AVS SNAPSHOT
After Visit Summary   10/15/2018    Rosemarie Michel    MRN: [id number]           Patient Information     Date Of Birth          1970        Visit Information        Provider Department      10/15/2018 1:00 PM Mervat Ulloa [facility]        Today's Diagnoses     Hypertension goal BP (blood pressure) < 140/90    -  1      Care Instructions    Labs today - I will have the results in the next 1-2 days and will release them through my chart    Start the hydrochlorothiazide (HCTZ) at 1/2 tablet once daily. Keep an eye on blood pressure - if it remains unchanged/elevated, let me know and we can increase to 1 full tablet    Plan on returning in 3-4 weeks for recheck and to see how things are going          Follow-ups after your visit        Follow-up notes from your care team     Return in about 4 weeks (around 11/12/2018) for BP Recheck, Routine Visit.      Who to contact     Normal or non-critical lab and imaging results will be communicated to you by Eloquahart, letter or phone within 4 business days after the clinic has received the results. If you do not hear from us within 7 days, please contact the clinic through Convozinet or phone. If you have a critical or abnormal lab result, we will notify you by phone as soon as possible.  Submit refill requests through obopay or call your pharmacy and they will forward the refill request to us. Please allow 3 business days for your refill to be completed.          If you need to speak with a  for additional information , please call: [phone]             Additional Information About Your Visit        obopay Information     obopay gives you secure access to your electronic health record. If you see a primary care provider, you can also send messages to your care team and make appointments. If you have questions, please call your primary care clinic.  If you do not have a primary care provider, please call  Pharmacy called again to clarify, and did clarify this is once daily prn.   Jolly Randall, RN  [facility] RN Triage Team     "[phone] and they will assist you.        Care EveryWhere ID     This is your Care EveryWhere ID. This could be used by other organizations to access your [place] medical records  [id number]        Your Vitals Were     Pulse Height BMI (Body Mass Index)             102 5' 3\" (1.6 m) 32.77 kg/m2          Blood Pressure from Last 3 Encounters:   10/15/18 (!) 150/103   06/28/18 (!) 170/96   04/26/18 151/89    Weight from Last 3 Encounters:   10/15/18 185 lb (83.9 kg)   06/28/18 190 lb (86.2 kg)   04/26/18 187 lb 3.2 oz (84.9 kg)              We Performed the Following     Basic metabolic panel  (Ca, Cl, CO2, Creat, Gluc, K, Na, BUN)     TSH with free T4 reflex          Today's Medication Changes          These changes are accurate as of 10/15/18  1:44 PM.  If you have any questions, ask your nurse or doctor.               Start taking these medicines.        Dose/Directions    hydrochlorothiazide 25 MG tablet   Commonly known as:  HYDRODIURIL   Used for:  Hypertension goal BP (blood pressure) < 140/90   Started by:  Mervat Ulloa PA-C        Dose:  12.5 mg   Take 0.5 tablets (12.5 mg) by mouth daily   Quantity:  30 tablet   Refills:  1            Where to get your medicines      These medications were sent to [facility] - [address] [facility]  [address] [facility] [address]     Phone:  [phone]     hydrochlorothiazide 25 MG tablet                Primary Care Provider Office Phone # Fax #    Citlali Newsome PA-C [phone] [address] [place] [address]        Equal Access to Services     KAYLA SCOTT AH: Quin Moran, waisaakda luqadaha, qaybta kaalmada adeegyada, juan f mckeon. So [facility] [phone].    ATENCIÓN: Si habla español, tiene a holliday disposición servicios gratuitos de asistencia lingüística. Llame al [phone].    We comply with applicable federal civil rights laws and " [place] laws. We do not discriminate on the basis of race, color, national origin, age, disability, sex, sexual orientation, or gender identity.            Thank you!     Thank you for choosing [facility]  for your care. Our goal is always to provide you with excellent care. Hearing back from our patients is one way we can continue to improve our services. Please take a few minutes to complete the written survey that you may receive in the mail after your visit with us. Thank you!             Your Updated Medication List - Protect others around you: Learn how to safely use, store and throw away your medicines at www.disposemymeds.org.          This list is accurate as of 10/15/18  1:44 PM.  Always use your most recent med list.                   Brand Name Dispense Instructions for use Diagnosis    fluticasone 50 MCG/ACT spray    FLONASE    1 Bottle    Spray 1-2 sprays into both nostrils daily    Benign paroxysmal positional vertigo of right ear       hydrochlorothiazide 25 MG tablet    HYDRODIURIL    30 tablet    Take 0.5 tablets (12.5 mg) by mouth daily    Hypertension goal BP (blood pressure) < 140/90

## 2021-09-08 DIAGNOSIS — H81.10 BENIGN PAROXYSMAL POSITIONAL VERTIGO, UNSPECIFIED LATERALITY: Primary | ICD-10-CM

## 2021-09-18 ENCOUNTER — HEALTH MAINTENANCE LETTER (OUTPATIENT)
Age: 51
End: 2021-09-18

## 2021-09-27 ENCOUNTER — OFFICE VISIT (OUTPATIENT)
Dept: FAMILY MEDICINE | Facility: CLINIC | Age: 51
End: 2021-09-27
Payer: COMMERCIAL

## 2021-09-27 VITALS
TEMPERATURE: 98.8 F | OXYGEN SATURATION: 98 % | HEIGHT: 63 IN | BODY MASS INDEX: 34.91 KG/M2 | HEART RATE: 93 BPM | SYSTOLIC BLOOD PRESSURE: 130 MMHG | DIASTOLIC BLOOD PRESSURE: 70 MMHG | WEIGHT: 197 LBS

## 2021-09-27 DIAGNOSIS — Z01.818 PREOP GENERAL PHYSICAL EXAM: Primary | ICD-10-CM

## 2021-09-27 DIAGNOSIS — I10 HYPERTENSION GOAL BP (BLOOD PRESSURE) < 140/90: ICD-10-CM

## 2021-09-27 DIAGNOSIS — M48.02 CERVICAL STENOSIS OF SPINAL CANAL: ICD-10-CM

## 2021-09-27 LAB
ANION GAP SERPL CALCULATED.3IONS-SCNC: 6 MMOL/L (ref 3–14)
BASOPHILS # BLD AUTO: 0 10E3/UL (ref 0–0.2)
BASOPHILS NFR BLD AUTO: 0 %
BUN SERPL-MCNC: 14 MG/DL (ref 7–30)
CALCIUM SERPL-MCNC: 8.8 MG/DL (ref 8.5–10.1)
CHLORIDE BLD-SCNC: 108 MMOL/L (ref 94–109)
CO2 SERPL-SCNC: 26 MMOL/L (ref 20–32)
CREAT SERPL-MCNC: 0.72 MG/DL (ref 0.52–1.04)
EOSINOPHIL # BLD AUTO: 0.1 10E3/UL (ref 0–0.7)
EOSINOPHIL NFR BLD AUTO: 1 %
ERYTHROCYTE [DISTWIDTH] IN BLOOD BY AUTOMATED COUNT: 13 % (ref 10–15)
GFR SERPL CREATININE-BSD FRML MDRD: >90 ML/MIN/1.73M2
GLUCOSE BLD-MCNC: 102 MG/DL (ref 70–99)
HCT VFR BLD AUTO: 41.8 % (ref 35–47)
HGB BLD-MCNC: 14.3 G/DL (ref 11.7–15.7)
LYMPHOCYTES # BLD AUTO: 3.2 10E3/UL (ref 0.8–5.3)
LYMPHOCYTES NFR BLD AUTO: 27 %
MCH RBC QN AUTO: 34 PG (ref 26.5–33)
MCHC RBC AUTO-ENTMCNC: 34.2 G/DL (ref 31.5–36.5)
MCV RBC AUTO: 100 FL (ref 78–100)
MONOCYTES # BLD AUTO: 0.9 10E3/UL (ref 0–1.3)
MONOCYTES NFR BLD AUTO: 8 %
NEUTROPHILS # BLD AUTO: 7.8 10E3/UL (ref 1.6–8.3)
NEUTROPHILS NFR BLD AUTO: 65 %
PLATELET # BLD AUTO: 176 10E3/UL (ref 150–450)
POTASSIUM BLD-SCNC: 4.1 MMOL/L (ref 3.4–5.3)
RBC # BLD AUTO: 4.2 10E6/UL (ref 3.8–5.2)
SODIUM SERPL-SCNC: 140 MMOL/L (ref 133–144)
WBC # BLD AUTO: 12.1 10E3/UL (ref 4–11)

## 2021-09-27 PROCEDURE — 99214 OFFICE O/P EST MOD 30 MIN: CPT | Performed by: PHYSICIAN ASSISTANT

## 2021-09-27 PROCEDURE — 36415 COLL VENOUS BLD VENIPUNCTURE: CPT | Performed by: PHYSICIAN ASSISTANT

## 2021-09-27 PROCEDURE — 80048 BASIC METABOLIC PNL TOTAL CA: CPT | Performed by: PHYSICIAN ASSISTANT

## 2021-09-27 PROCEDURE — 85025 COMPLETE CBC W/AUTO DIFF WBC: CPT | Performed by: PHYSICIAN ASSISTANT

## 2021-09-27 RX ORDER — HYDROCHLOROTHIAZIDE 25 MG/1
12.5 TABLET ORAL DAILY
Qty: 135 TABLET | Refills: 1 | Status: SHIPPED | OUTPATIENT
Start: 2021-09-27 | End: 2022-10-12

## 2021-09-27 ASSESSMENT — MIFFLIN-ST. JEOR: SCORE: 1491.68

## 2021-09-27 ASSESSMENT — PAIN SCALES - GENERAL: PAINLEVEL: MODERATE PAIN (4)

## 2021-09-27 NOTE — PATIENT INSTRUCTIONS

## 2021-09-27 NOTE — PROGRESS NOTES
[facility]  [address] [address]  Phone: [phone]  Primary Provider: Mervat Bonner  Pre-op Performing Provider: MERVAT BONNER  :[id number]}  PREOPERATIVE EVALUATION:  Today's date: 9/27/2021    Rosemarie Michel is a 51 year old female who presents for a preoperative evaluation.    Surgical Information:  Surgery/Procedure: Anterior Cervical Decompression Fusion C5 and C7 & Iliac Crest Bone Graft [place]  Surgery Location: [facility]   Surgeon: Dr. Rico  Surgery Date: 10/6/2021  Time of Surgery: 11:30am  Where patient plans to recover: At home with family  Fax number for surgical facility: [phone]    Type of Anesthesia Anticipated: General    Assessment & Plan     The proposed surgical procedure is considered INTERMEDIATE risk.    (Z01.818) Preop general physical exam  (primary encounter diagnosis)  Comment:   Plan: CBC with platelets and differential, Basic         metabolic panel  (Ca, Cl, CO2, Creat, Gluc, K,         Na, BUN)            (M48.02) Cervical stenosis of spinal canal  Comment:   Plan: CBC with platelets and differential, Basic         metabolic panel  (Ca, Cl, CO2, Creat, Gluc, K,         Na, BUN)            (I10) Hypertension goal BP (blood pressure) < 140/90  Comment:   Plan: hydrochlorothiazide (HYDRODIURIL) 25 MG tablet                     Risks and Recommendations:  The patient has the following additional risks and recommendations for perioperative complications:   - No identified additional risk factors other than previously addressed        RECOMMENDATION:  APPROVAL GIVEN to proceed with proposed procedure, without further diagnostic evaluation.      Subjective     HPI related to upcoming procedure: cervical stenosis - pain no longer controlled with conservative measures (injections, physical therapy)    Preop Questions 9/21/2021   1. Have you ever had a heart attack or stroke? No   2. Have you ever had  surgery on your heart or blood vessels, such as a stent placement, a coronary artery bypass, or surgery on an artery in your head, neck, heart, or legs? No   3. Do you have chest pain with activity? No   4. Do you have a history of  heart failure? No   5. Do you currently have a cold, bronchitis or symptoms of other infection? No   6. Do you have a cough, shortness of breath, or wheezing? No   7. Do you or anyone in your family have previous history of blood clots? No   8. Do you or does anyone in your family have a serious bleeding problem such as prolonged bleeding following surgeries or cuts? No   9. Have you ever had problems with anemia or been told to take iron pills? No   10. Have you had any abnormal blood loss such as black, tarry or bloody stools, or abnormal vaginal bleeding? No   11. Have you ever had a blood transfusion? No   12. Are you willing to have a blood transfusion if it is medically needed before, during, or after your surgery? Yes   13. Have you or any of your relatives ever had problems with anesthesia? No   14. Do you have sleep apnea, excessive snoring or daytime drowsiness? No   15. Do you have any artifical heart valves or other implanted medical devices like a pacemaker, defibrillator, or continuous glucose monitor? No   16. Do you have artificial joints? No   17. Are you allergic to latex? YES   18. Is there any chance that you may be pregnant? No       Preoperative Review of :   reviewed - no record of controlled substances prescribed.      Status of Chronic Conditions:  See problem list for active medical problems.  Problems all longstanding and stable, except as noted/documented.  See ROS for pertinent symptoms related to these conditions.      Review of Systems  CONSTITUTIONAL: NEGATIVE for fever, chills, change in weight  INTEGUMENTARY/SKIN: NEGATIVE for worrisome rashes, moles or lesions  EYES: NEGATIVE for vision changes or irritation  ENT/MOUTH: NEGATIVE for ear, mouth and  throat problems  RESP: NEGATIVE for significant cough or SOB  CV: NEGATIVE for chest pain, palpitations or peripheral edema  GI: NEGATIVE for nausea, abdominal pain, heartburn, or change in bowel habits  : NEGATIVE for frequency, dysuria, or hematuria  MUSCULOSKELETAL: NEGATIVE for significant arthralgias or myalgia  NEURO: NEGATIVE for weakness, dizziness or paresthesias  ENDOCRINE: NEGATIVE for temperature intolerance, skin/hair changes  HEME: NEGATIVE for bleeding problems  PSYCHIATRIC: NEGATIVE for changes in mood or affect    Patient Active Problem List    Diagnosis Date Noted     Myofascial pain 08/20/2018     Priority: Medium     Migraine 08/20/2018     Priority: Medium     Arthralgia of temporomandibular joint 08/20/2018     Priority: Medium     Hypertension goal BP (blood pressure) < 140/90 02/02/2018     Priority: Medium     BPPV (benign paroxysmal positional vertigo) 10/27/2017     Priority: Medium     Excessive bleeding in premenopausal period 01/26/2016     Priority: Medium     Hyperlipidemia LDL goal <160 01/09/2014     Priority: Medium     Tobacco use disorder 12/04/2009     Priority: Medium     Overview:   Tobacco Abuse        Past Medical History:   Diagnosis Date     Abnormal Pap smear 2000    , normal paps since     BPPV (benign paroxysmal positional vertigo)      Cervical disc disorder      Chalazion of right upper eyelid      DUB (dysfunctional uterine bleeding)      Hyperlipidemia      Mastodynia sept 2014    left breast  tenderness     Menorrhagia      Motion sickness      Pregnancy induced hypertension      Ureteral duplication, bilateral 2019    noted during cystoscopy at time of hysterectomy     Past Surgical History:   Procedure Laterality Date     AS HYSTEROSCOPY, SURGICAL; W/ ENDOMETRIAL ABLATION, ANY METHOD       CYSTOSCOPY N/A 4/18/2019    Procedure: CYSTOSCOPY;  Surgeon: Yolande Jackson MD;  Location: [facility] OR     GYN SURGERY      LEEP, c. section     HYSTERECTOMY  04/18/2019      "LAPAROSCOPIC HYSTERECTOMY SUPRACERVICAL N/A 4/18/2019    Procedure: LAPAROSCOPIC SUPRACERVICAL HYSTERECTOMY,POWERED MORCELLATION IN A BAG (LATEX ALLERGY: CONTACT ALLERGY);  Surgeon: Yolande Jackson MD;  Location:  OR     LAPAROSCOPIC SALPINGECTOMY Bilateral 4/18/2019    Procedure: BILATERAL LAPAROSCOPIC SALPINGECTOMY;  Surgeon: Yolande Jackson MD;  Location:  OR     Current Outpatient Medications   Medication Sig Dispense Refill     acetaminophen (TYLENOL) 500 MG tablet Take 500-1,000 mg by mouth every 6 hours as needed for mild pain       citalopram (CELEXA) 20 MG tablet Take 1 tablet (20 mg) by mouth daily 90 tablet 3     hydrochlorothiazide (HYDRODIURIL) 25 MG tablet Take 0.5 tablets (12.5 mg) by mouth daily 135 tablet 1     ibuprofen (ADVIL/MOTRIN) 200 MG tablet Take 200-600 mg by mouth every 4 hours as needed for mild pain       melatonin 5 MG tablet Take 20 mg by mouth nightly as needed for sleep         Allergies   Allergen Reactions     Bee Venom Anaphylaxis     Latex      Shellfish Allergy         Social History     Tobacco Use     Smoking status: Current Every Day Smoker     Packs/day: 0.25     Years: 21.00     Pack years: 5.25     Types: Cigarettes     Smokeless tobacco: Never Used     Tobacco comment: 5-6 cigs per day   Substance Use Topics     Alcohol use: Yes     Alcohol/week: 0.0 standard drinks     Comment:  5 Beers Through Whole Pregnancy     Family History   Problem Relation Age of Onset     Diabetes Mother 57        type 2     Hyperlipidemia Mother      Breast Cancer Mother      Diabetes Father 60        type 2     Cancer Father 62        bladder     Hyperlipidemia Father      Diabetes Brother 42        type 2     Hyperlipidemia Paternal Grandmother      History   Drug Use No         Objective     /70   Pulse 93   Temp 98.8  F (37.1  C) (Tympanic)   Ht 1.607 m (5' 3.25\")   Wt 89.4 kg (197 lb)   LMP 04/16/2019   SpO2 98%   BMI 34.62 kg/m      Physical Exam    GENERAL " APPEARANCE: healthy, alert and no distress     EYES: EOMI, PERRL     HENT: ear canals and TM's normal and nose and mouth without ulcers or lesions     NECK: no adenopathy, no asymmetry, masses, or scars and thyroid normal to palpation     RESP: lungs clear to auscultation - no rales, rhonchi or wheezes     CV: regular rates and rhythm, normal S1 S2, no S3 or S4 and no murmur, click or rub     ABDOMEN:  soft, nontender, no HSM or masses and bowel sounds normal     MS: extremities normal- no gross deformities noted, no evidence of inflammation in joints, FROM in all extremities.     SKIN: no suspicious lesions or rashes     NEURO: Normal strength and tone, sensory exam grossly normal, mentation intact and speech normal     PSYCH: mentation appears normal. and affect normal/bright     LYMPHATICS: No cervical adenopathy    No results for input(s): HGB, PLT, INR, NA, POTASSIUM, CR, A1C in the last 70591 hours.     Diagnostics:  Labs pending at this time.  Results will be reviewed when available.   No EKG required, no history of coronary heart disease, significant arrhythmia, peripheral arterial disease or other structural heart disease.    Revised Cardiac Risk Index (RCRI):  The patient has the following serious cardiovascular risks for perioperative complications:   - No serious cardiac risks = 0 points     RCRI Interpretation: 0 points: Class I (very low risk - 0.4% complication rate)           Signed Electronically by: Mervat Ulloa PA-C  Copy of this evaluation report is provided to requesting physician.

## 2021-09-29 ENCOUNTER — DOCUMENTATION ONLY (OUTPATIENT)
Dept: LAB | Facility: CLINIC | Age: 51
End: 2021-09-29

## 2021-09-29 DIAGNOSIS — Z01.818 PRE-OPERATIVE EXAMINATION: Primary | ICD-10-CM

## 2021-09-29 NOTE — PROGRESS NOTES
Rosemarie has a lab appointment on 10/1/21 for a preop Covid test. Please place future orders for this.    Thank you,  Faizan Vallejo

## 2021-10-05 ENCOUNTER — LAB (OUTPATIENT)
Dept: LAB | Facility: CLINIC | Age: 51
End: 2021-10-05
Attending: PHYSICIAN ASSISTANT
Payer: COMMERCIAL

## 2021-10-05 DIAGNOSIS — Z01.818 PRE-OPERATIVE EXAMINATION: ICD-10-CM

## 2021-11-09 ENCOUNTER — TRANSFERRED RECORDS (OUTPATIENT)
Dept: HEALTH INFORMATION MANAGEMENT | Facility: CLINIC | Age: 51
End: 2021-11-09
Payer: COMMERCIAL

## 2022-01-08 ENCOUNTER — HEALTH MAINTENANCE LETTER (OUTPATIENT)
Age: 52
End: 2022-01-08

## 2022-01-25 ENCOUNTER — TRANSFERRED RECORDS (OUTPATIENT)
Dept: HEALTH INFORMATION MANAGEMENT | Facility: CLINIC | Age: 52
End: 2022-01-25
Payer: COMMERCIAL

## 2022-04-30 ENCOUNTER — HEALTH MAINTENANCE LETTER (OUTPATIENT)
Age: 52
End: 2022-04-30

## 2022-08-15 DIAGNOSIS — F43.23 ADJUSTMENT DISORDER WITH MIXED ANXIETY AND DEPRESSED MOOD: ICD-10-CM

## 2022-08-15 RX ORDER — CITALOPRAM HYDROBROMIDE 20 MG/1
TABLET ORAL
Qty: 90 TABLET | Refills: 3 | Status: SHIPPED | OUTPATIENT
Start: 2022-08-15 | End: 2023-08-10

## 2022-08-15 NOTE — TELEPHONE ENCOUNTER
Routing refill request to provider for review/approval because:  Patient needs to be seen because - due for annual exam - med check   PHQ9 & GAD7 overdue    Ricki Singer, RN

## 2022-10-11 ENCOUNTER — MYC MEDICAL ADVICE (OUTPATIENT)
Dept: FAMILY MEDICINE | Facility: CLINIC | Age: 52
End: 2022-10-11

## 2022-10-11 DIAGNOSIS — I10 HYPERTENSION GOAL BP (BLOOD PRESSURE) < 140/90: ICD-10-CM

## 2022-10-12 RX ORDER — HYDROCHLOROTHIAZIDE 25 MG/1
12.5 TABLET ORAL DAILY
Qty: 135 TABLET | Refills: 1 | Status: SHIPPED | OUTPATIENT
Start: 2022-10-12 | End: 2023-12-19

## 2022-10-21 ENCOUNTER — MYC MEDICAL ADVICE (OUTPATIENT)
Dept: FAMILY MEDICINE | Facility: CLINIC | Age: 52
End: 2022-10-21

## 2022-10-27 ENCOUNTER — ANCILLARY PROCEDURE (OUTPATIENT)
Dept: MAMMOGRAPHY | Facility: CLINIC | Age: 52
End: 2022-10-27
Attending: PHYSICIAN ASSISTANT
Payer: COMMERCIAL

## 2022-10-27 DIAGNOSIS — Z12.31 VISIT FOR SCREENING MAMMOGRAM: ICD-10-CM

## 2022-10-27 PROCEDURE — 77067 SCR MAMMO BI INCL CAD: CPT | Mod: TC | Performed by: RADIOLOGY

## 2022-11-01 NOTE — PROGRESS NOTES
Chief Complaint - Dizziness    History of Present Illness - Rosemarie Michel is a 52 year old female who presents with dizziness. Patient here for ear cleaning. She wonders if ear cleaning will help vertigo. She gets dizziness with changes in seasons - April and October usually. Started after jay diving 10 years ago. Loud sounds bother her. She looses energy. She feels in the summer and winter she doesn't have symptoms. Stress can make it worse. It is very sporadic. Some days it is mild. Sometimes it affects thinking. No headaches. She has a h/o TMJ as well diagnosed by Dr. Christianson in 2018.  It lasts for days at times. The patient notes no real nausea. The patient has noted sometimes ringing ear symptoms. No otalgia or hearing loss.     Tests personally reviewed today for this visit:   1.) BMP glucose 102 otherwise normal 9/27/22  2.) CBC WBC 12.1 on 9/27/22    Past Medical History -   Patient Active Problem List   Diagnosis     Hyperlipidemia LDL goal <160     Excessive bleeding in premenopausal period     BPPV (benign paroxysmal positional vertigo)     Hypertension goal BP (blood pressure) < 140/90     Tobacco use disorder     Myofascial pain     Migraine     Arthralgia of temporomandibular joint       Current Medications -   Current Outpatient Medications:      citalopram (CELEXA) 20 MG tablet, TAKE 1 TABLET DAILY, Disp: 90 tablet, Rfl: 3     hydrochlorothiazide (HYDRODIURIL) 25 MG tablet, Take 0.5 tablets (12.5 mg) by mouth daily, Disp: 135 tablet, Rfl: 1     acetaminophen (TYLENOL) 500 MG tablet, Take 500-1,000 mg by mouth every 6 hours as needed for mild pain, Disp: , Rfl:      ibuprofen (ADVIL/MOTRIN) 200 MG tablet, Take 200-600 mg by mouth every 4 hours as needed for mild pain, Disp: , Rfl:      melatonin 5 MG tablet, Take 20 mg by mouth nightly as needed for sleep, Disp: , Rfl:     Allergies -   Allergies   Allergen Reactions     Bee Venom Anaphylaxis     Latex      Shellfish Allergy        Social  History -   Social History     Socioeconomic History     Marital status:      Spouse name: Phan     Number of children: 1     Years of education: None     Highest education level: None   Occupational History     Employer: [employer]   Tobacco Use     Smoking status: Every Day     Packs/day: 0.25     Years: 21.00     Pack years: 5.25     Types: Cigarettes     Smokeless tobacco: Never     Tobacco comments:     5-6 cigs per day   Substance and Sexual Activity     Alcohol use: Yes     Alcohol/week: 0.0 standard drinks     Comment:  5 Beers Through Whole Pregnancy     Drug use: No     Sexual activity: Yes     Partners: Male     Birth control/protection: Condom   Other Topics Concern     Parent/sibling w/ CABG, MI or angioplasty before 65F 55M? No       Family History -   Family History   Problem Relation Age of Onset     Diabetes Mother 57        type 2     Hyperlipidemia Mother      Breast Cancer Mother      Diabetes Father 60        type 2     Cancer Father 62        bladder     Hyperlipidemia Father      Diabetes Brother 42        type 2     Hyperlipidemia Paternal Grandmother      Physical Exam  BP (!) 156/97   Pulse 104   Resp 16   LMP 04/16/2019   SpO2 96%   General - The patient is in no distress.  Alert and oriented x3, answers questions and cooperates with examination appropriately.   Voice and Breathing - The patient was breathing comfortably without the use of accessory muscles. There was no wheezing, stridor, or stertor.  The patients voice was clear and strong, with no dysphonia.    Eyes - Pupils are reactive to light. Extraocular movements intact. Sclera were not icteric or injected, conjunctiva were pink and moist. No nystagmus.  Ears - The auricles appeared normal. The external auditory canals were nonedematous and nonerythematous. A small amount of wax I removed. The tympanic membranes are normal in appearance, bony landmarks are intact.  No retraction, perforation, or masses.  No fluid or  purulence was seen in the external canal or the middle ear.   Neurologic - Cranial nerves II-XII are grossly intact. Specifically, the facial nerve is intact, House-Brackmann grade 1 of 6.   Neck -  Soft, nontender. Palpation of the occipital, submental, submandibular, internal jugular chain, and supraclavicular nodes did not demonstrate any abnormal lymph nodes or masses. The parotid glands were without masses. Palpation of the thyroid was soft and smooth, with no nodules or goiter appreciated.  The trachea was midline.        A/P -     ICD-10-CM    1. Vestibular migraine  G43.809 topiramate (TOPAMAX) 25 MG tablet          Rosemarie Michel is a 52 year old female with dizziness.  This is very intermittent and she will go months where she feels normal.  She feels there is a seasonality to it happening in the fall and spring.  When she does get dizzy will last for days.  It does not seem like vertigo or inner ear pathology.  She does not get ear symptoms with this, denies hearing loss, and has a normal ear exam.  I think she might be experiencing vestibular migraine.  Other neurologic or psychologic disorders may be occurring.  She has seen neurology for this did not have a good experience.  Therefore we discussed a trial of topiramate when the symptoms occur.  If this helps its more likely vestibular migraine.  If she continues to use this more regularly we can discuss LFTs, BMP, and CBC.         Buck Shea MD  Otolaryngology  [facility]

## 2022-11-02 ENCOUNTER — OFFICE VISIT (OUTPATIENT)
Dept: OTOLARYNGOLOGY | Facility: CLINIC | Age: 52
End: 2022-11-02
Payer: COMMERCIAL

## 2022-11-02 VITALS
RESPIRATION RATE: 16 BRPM | SYSTOLIC BLOOD PRESSURE: 156 MMHG | OXYGEN SATURATION: 96 % | DIASTOLIC BLOOD PRESSURE: 97 MMHG | HEART RATE: 104 BPM

## 2022-11-02 DIAGNOSIS — G43.809 VESTIBULAR MIGRAINE: Primary | ICD-10-CM

## 2022-11-02 PROCEDURE — 99203 OFFICE O/P NEW LOW 30 MIN: CPT | Performed by: OTOLARYNGOLOGY

## 2022-11-02 RX ORDER — TOPIRAMATE 25 MG/1
25 TABLET, FILM COATED ORAL 2 TIMES DAILY
Qty: 60 TABLET | Refills: 2 | Status: SHIPPED | OUTPATIENT
Start: 2022-11-02 | End: 2023-12-20

## 2022-11-02 ASSESSMENT — PAIN SCALES - GENERAL: PAINLEVEL: NO PAIN (0)

## 2022-11-02 NOTE — LETTER
11/2/2022         RE: Rosemarie Michel  [address]  [facility] [address]        Dear Colleague,    Thank you for referring your patient, Rosemarie Michel, to the [facility]. Please see a copy of my visit note below.    Chief Complaint - Dizziness    History of Present Illness - Rosemarie Michel is a 52 year old female who presents with dizziness. Patient here for ear cleaning. She wonders if ear cleaning will help vertigo. She gets dizziness with changes in seasons - April and October usually. Started after jay diving 10 years ago. Loud sounds bother her. She looses energy. She feels in the summer and winter she doesn't have symptoms. Stress can make it worse. It is very sporadic. Some days it is mild. Sometimes it affects thinking. No headaches. She has a h/o TMJ as well diagnosed by Dr. Christianson in 2018.  It lasts for days at times. The patient notes no real nausea. The patient has noted sometimes ringing ear symptoms. No otalgia or hearing loss.     Tests personally reviewed today for this visit:   1.) BMP glucose 102 otherwise normal 9/27/22  2.) CBC WBC 12.1 on 9/27/22    Past Medical History -   Patient Active Problem List   Diagnosis     Hyperlipidemia LDL goal <160     Excessive bleeding in premenopausal period     BPPV (benign paroxysmal positional vertigo)     Hypertension goal BP (blood pressure) < 140/90     Tobacco use disorder     Myofascial pain     Migraine     Arthralgia of temporomandibular joint       Current Medications -   Current Outpatient Medications:      citalopram (CELEXA) 20 MG tablet, TAKE 1 TABLET DAILY, Disp: 90 tablet, Rfl: 3     hydrochlorothiazide (HYDRODIURIL) 25 MG tablet, Take 0.5 tablets (12.5 mg) by mouth daily, Disp: 135 tablet, Rfl: 1     acetaminophen (TYLENOL) 500 MG tablet, Take 500-1,000 mg by mouth every 6 hours as needed for mild pain, Disp: , Rfl:      ibuprofen (ADVIL/MOTRIN) 200 MG tablet, Take 200-600 mg by mouth every  4 hours as needed for mild pain, Disp: , Rfl:      melatonin 5 MG tablet, Take 20 mg by mouth nightly as needed for sleep, Disp: , Rfl:     Allergies -   Allergies   Allergen Reactions     Bee Venom Anaphylaxis     Latex      Shellfish Allergy        Social History -   Social History     Socioeconomic History     Marital status:      Spouse name: Phan     Number of children: 1     Years of education: None     Highest education level: None   Occupational History     Employer: [employer]   Tobacco Use     Smoking status: Every Day     Packs/day: 0.25     Years: 21.00     Pack years: 5.25     Types: Cigarettes     Smokeless tobacco: Never     Tobacco comments:     5-6 cigs per day   Substance and Sexual Activity     Alcohol use: Yes     Alcohol/week: 0.0 standard drinks     Comment:  5 Beers Through Whole Pregnancy     Drug use: No     Sexual activity: Yes     Partners: Male     Birth control/protection: Condom   Other Topics Concern     Parent/sibling w/ CABG, MI or angioplasty before 65F 55M? No       Family History -   Family History   Problem Relation Age of Onset     Diabetes Mother 57        type 2     Hyperlipidemia Mother      Breast Cancer Mother      Diabetes Father 60        type 2     Cancer Father 62        bladder     Hyperlipidemia Father      Diabetes Brother 42        type 2     Hyperlipidemia Paternal Grandmother      Physical Exam  BP (!) 156/97   Pulse 104   Resp 16   LMP 04/16/2019   SpO2 96%   General - The patient is in no distress.  Alert and oriented x3, answers questions and cooperates with examination appropriately.   Voice and Breathing - The patient was breathing comfortably without the use of accessory muscles. There was no wheezing, stridor, or stertor.  The patients voice was clear and strong, with no dysphonia.    Eyes - Pupils are reactive to light. Extraocular movements intact. Sclera were not icteric or injected, conjunctiva were pink and moist. No nystagmus.  Ears - The  auricles appeared normal. The external auditory canals were nonedematous and nonerythematous. A small amount of wax I removed. The tympanic membranes are normal in appearance, bony landmarks are intact.  No retraction, perforation, or masses.  No fluid or purulence was seen in the external canal or the middle ear.   Neurologic - Cranial nerves II-XII are grossly intact. Specifically, the facial nerve is intact, House-Brackmann grade 1 of 6.   Neck -  Soft, nontender. Palpation of the occipital, submental, submandibular, internal jugular chain, and supraclavicular nodes did not demonstrate any abnormal lymph nodes or masses. The parotid glands were without masses. Palpation of the thyroid was soft and smooth, with no nodules or goiter appreciated.  The trachea was midline.        A/P -     ICD-10-CM    1. Vestibular migraine  G43.809 topiramate (TOPAMAX) 25 MG tablet          Rosemarie Michel is a 52 year old female with dizziness.  This is very intermittent and she will go months where she feels normal.  She feels there is a seasonality to it happening in the fall and spring.  When she does get dizzy will last for days.  It does not seem like vertigo or inner ear pathology.  She does not get ear symptoms with this, denies hearing loss, and has a normal ear exam.  I think she might be experiencing vestibular migraine.  Other neurologic or psychologic disorders may be occurring.  She has seen neurology for this did not have a good experience.  Therefore we discussed a trial of topiramate when the symptoms occur.  If this helps its more likely vestibular migraine.  If she continues to use this more regularly we can discuss LFTs, BMP, and CBC.         Buck Shea MD  Otolaryngology  [facility]            Again, thank you for allowing me to participate in the care of your patient.        Sincerely,        Buck Shea MD

## 2022-11-20 ENCOUNTER — HEALTH MAINTENANCE LETTER (OUTPATIENT)
Age: 52
End: 2022-11-20

## 2023-02-14 ENCOUNTER — TELEPHONE (OUTPATIENT)
Dept: OPHTHALMOLOGY | Facility: CLINIC | Age: 53
End: 2023-02-14
Payer: COMMERCIAL

## 2023-02-14 NOTE — TELEPHONE ENCOUNTER
[facility] Call Center    Phone Message    May a detailed message be left on voicemail: yes     Reason for Call: Other: Pt was seen at the [facility] in [place] today for a Rt eye injury. She reports that her dog scratched her eye this morning and she has a small corneal scratch and a blood blister on the white of her eye. The urgent care recommended that she have f/u in the next few days. Pt has seen Dr. Wilson in the past. Please call pt with scheduling options. Thank you.     Action Taken: Message routed to:  Other: [facility]    Travel Screening: Not Applicable

## 2023-02-14 NOTE — TELEPHONE ENCOUNTER
"Given tobramycin by urgent care. No pain right eye. Per Dr. Briscoe - pt does not need to be seen as she is not in pain.  Pt would still like to be seen due to \"blood blister\" on white part of right eye - will schedule for Friday with Dr. Wilson. Pt will cancel if she is doing better.   "

## 2023-02-17 ENCOUNTER — OFFICE VISIT (OUTPATIENT)
Dept: OPHTHALMOLOGY | Facility: CLINIC | Age: 53
End: 2023-02-17
Payer: COMMERCIAL

## 2023-02-17 DIAGNOSIS — S05.91XA EYE INJURY, NON-PENETRATING, RIGHT, INITIAL ENCOUNTER: Primary | ICD-10-CM

## 2023-02-17 PROCEDURE — 92012 INTRM OPH EXAM EST PATIENT: CPT | Performed by: STUDENT IN AN ORGANIZED HEALTH CARE EDUCATION/TRAINING PROGRAM

## 2023-02-17 RX ORDER — TOBRAMYCIN 3 MG/ML
SOLUTION/ DROPS OPHTHALMIC
COMMUNITY
Start: 2023-02-14 | End: 2023-12-19

## 2023-02-17 RX ORDER — TOBRAMYCIN AND DEXAMETHASONE 3; 1 MG/ML; MG/ML
1 SUSPENSION/ DROPS OPHTHALMIC 3 TIMES DAILY
Qty: 5 ML | Refills: 0 | Status: SHIPPED | OUTPATIENT
Start: 2023-02-17 | End: 2023-12-19

## 2023-02-17 ASSESSMENT — VISUAL ACUITY
OD_SC: 20/25
OS_SC: 20/25
METHOD: SNELLEN - LINEAR

## 2023-02-17 ASSESSMENT — EXTERNAL EXAM - LEFT EYE: OS_EXAM: NORMAL

## 2023-02-17 ASSESSMENT — EXTERNAL EXAM - RIGHT EYE: OD_EXAM: NORMAL

## 2023-02-17 ASSESSMENT — SLIT LAMP EXAM - LIDS: COMMENTS: NORMAL

## 2023-02-17 NOTE — LETTER
"    2/17/2023         RE: Rosemarie Michel  [address]  [facility] [address]        Dear Colleague,    Thank you for referring your patient, Rosemarie Michel, to the [facility]. Please see a copy of my visit note below.     Current Eye Medications:  Tobramycin - right eye BID     Subjective:  Dog scratched right eye on Tuesday - seen at urgent care, given tobramycin, spoke to our clinic and did not have pain that day, declined visit as she did not have discomfort or decrease in vision that day.    Patient now noticing FBS under RUL, tearing and aching/headache sensation around the right eye. Redness right eye appears to be less compared to Tuesday.    Had covid two weeks ago.      Objective:  See Ophthalmology Exam.      Assessment:  Rosemarie Michel is a 52 year old female who presents with:   Encounter Diagnosis   Name Primary?     Eye injury, non-penetrating, right, initial encounter Scratched by her dog 5 days ago. Some mild papillary reaction and  resolving subconjunctival hemorrhage right eye.        Plan:  Tobradex drop three times a day right eye for 5 days    Thereafter use artificial tears up to four times a day (like Refresh Optive, Systane Balance, TheraTears, or generic artificial tears are ok. Avoid \"get the red out\" drops).     Regina Wilson MD  [phone]                Again, thank you for allowing me to participate in the care of your patient.        Sincerely,        Regina Wilson MD    "

## 2023-02-17 NOTE — PROGRESS NOTES
" Current Eye Medications:  Tobramycin - right eye BID     Subjective:  Dog scratched right eye on Tuesday - seen at urgent care, given tobramycin, spoke to our clinic and did not have pain that day, declined visit as she did not have discomfort or decrease in vision that day.    Patient now noticing FBS under RUL, tearing and aching/headache sensation around the right eye. Redness right eye appears to be less compared to Tuesday.    Had covid two weeks ago.      Objective:  See Ophthalmology Exam.      Assessment:  Rosemarie Michel is a 52 year old female who presents with:   Encounter Diagnosis   Name Primary?     Eye injury, non-penetrating, right, initial encounter Scratched by her dog 5 days ago. Some mild papillary reaction and  resolving subconjunctival hemorrhage right eye.        Plan:  Tobradex drop three times a day right eye for 5 days    Thereafter use artificial tears up to four times a day (like Refresh Optive, Systane Balance, TheraTears, or generic artificial tears are ok. Avoid \"get the red out\" drops).     Regina Wilson MD  [phone]            "

## 2023-02-17 NOTE — PATIENT INSTRUCTIONS
"Tobradex drop three times a day right eye for 5 days    Thereafter use artificial tears up to four times a day (like Refresh Optive, Systane Balance, TheraTears, or generic artificial tears are ok. Avoid \"get the red out\" drops).     Regina Wilson MD  [phone]   "

## 2023-04-15 ENCOUNTER — HEALTH MAINTENANCE LETTER (OUTPATIENT)
Age: 53
End: 2023-04-15

## 2023-08-10 DIAGNOSIS — F43.23 ADJUSTMENT DISORDER WITH MIXED ANXIETY AND DEPRESSED MOOD: ICD-10-CM

## 2023-08-10 RX ORDER — CITALOPRAM HYDROBROMIDE 20 MG/1
TABLET ORAL
Qty: 90 TABLET | Refills: 3 | Status: SHIPPED | OUTPATIENT
Start: 2023-08-10 | End: 2024-04-04

## 2023-08-10 NOTE — TELEPHONE ENCOUNTER
"Routing refill request to provider for review/approval because:  PHQ9 overdue       Requested Prescriptions   Pending Prescriptions Disp Refills    citalopram (CELEXA) 20 MG tablet [Pharmacy Med Name: CITALOPRAM HYDROBROMIDE TABS 20MG] 90 tablet 3     Sig: TAKE 1 TABLET DAILY       SSRIs Protocol Failed - 8/10/2023 12:24 AM        Failed - PHQ-9 score less than 5 in past 6 months     Please review last PHQ-9 score.           Passed - Medication is active on med list        Passed - Patient is age 18 or older        Passed - No active pregnancy on record        Passed - No positive pregnancy test in last 12 months        Passed - Recent (6 mo) or future (30 days) visit within the authorizing provider's specialty     Patient had office visit in the last 6 months or has a visit in the next 30 days with authorizing provider or within the authorizing provider's specialty.  See \"Patient Info\" tab in inbasket, or \"Choose Columns\" in Meds & Orders section of the refill encounter.                     Ricki Singer RN 08/10/23 9:33 AM   "

## 2023-08-24 ENCOUNTER — APPOINTMENT (OUTPATIENT)
Dept: LAB | Facility: CLINIC | Age: 53
End: 2023-08-24
Payer: COMMERCIAL

## 2023-08-24 ENCOUNTER — VIRTUAL VISIT (OUTPATIENT)
Dept: FAMILY MEDICINE | Facility: CLINIC | Age: 53
End: 2023-08-24
Payer: COMMERCIAL

## 2023-08-24 DIAGNOSIS — J02.0 STREPTOCOCCAL PHARYNGITIS: ICD-10-CM

## 2023-08-24 DIAGNOSIS — J02.9 SORE THROAT: Primary | ICD-10-CM

## 2023-08-24 LAB — DEPRECATED S PYO AG THROAT QL EIA: POSITIVE

## 2023-08-24 PROCEDURE — 99213 OFFICE O/P EST LOW 20 MIN: CPT | Mod: VID | Performed by: FAMILY MEDICINE

## 2023-08-24 PROCEDURE — 87880 STREP A ASSAY W/OPTIC: CPT | Mod: VID | Performed by: FAMILY MEDICINE

## 2023-08-24 RX ORDER — AMOXICILLIN 875 MG
875 TABLET ORAL 2 TIMES DAILY
Qty: 20 TABLET | Refills: 0 | Status: SHIPPED | OUTPATIENT
Start: 2023-08-24 | End: 2023-09-03

## 2023-08-24 ASSESSMENT — ENCOUNTER SYMPTOMS: SORE THROAT: 1

## 2023-08-24 NOTE — PROGRESS NOTES
Rosemarie is a 53 year old who is being evaluated via a billable video visit.      How would you like to obtain your AVS? MyChart  If the video visit is dropped, the invitation should be resent by: Text to cell phone: [phone]  Will anyone else be joining your video visit? No            ICD-10-CM    1. Sore throat  J02.9 Streptococcus A Rapid Screen w/Reflex to PCR - Clinic Collect     amoxicillin (AMOXIL) 875 MG tablet      2. Streptococcal pharyngitis  J02.0 amoxicillin (AMOXIL) 875 MG tablet        Started with video viist but ended with telephone as we were having technical difficulty on her end  Strep test is positive called patient back and recommended antibiotics   home covid test if persisting or having additional upper respiratory infection sx      Subjective   Rosemarie is a 53 year old, presenting for the following health issues:  Pharyngitis         No data to display                History of Present Illness       Reason for visit:  Sore throat  Symptom onset:  1-3 days ago  Symptoms include:  Sore throat  Symptom intensity:  Moderate  Symptom progression:  Worsening  What makes it better:  Taking emergen C and multivitamin and tea    Vertigo history of it -stable  Left side-red and swollen, exudates, no fever, no congestion, back to school, unsure of contact. No Muscle ache  No congeston          Review of Systems   HENT:  Positive for sore throat.       Constitutional, HEENT, cardiovascular, pulmonary, GI, , musculoskeletal, neuro, skin, endocrine and psych systems are negative, except as otherwise noted.      Objective           Vitals:  No vitals were obtained today due to virtual visit.    Physical Exam   GENERAL: Healthy, alert and no distress  EYES: Eyes grossly normal to inspection.  No discharge or erythema, or obvious scleral/conjunctival abnormalities.  RESP: No audible wheeze, cough, or visible cyanosis.  No visible retractions or increased work of breathing.    SKIN: Visible skin clear. No  significant rash, abnormal pigmentation or lesions.  PSYCH: Mentation appears normal, affect normal/bright, judgement and insight intact, normal speech and appearance well-groomed.        ----- Service Performed and Documented by Resident or Fellow ------        Video-Visit Details    Type of service:  Video Visit     Originating Location (pt. Location): Home    Distant Location (provider location):  On-site  Platform used for Video Visit: [employer]

## 2023-09-15 NOTE — PROGRESS NOTES
Continued Stay SW/CM Assessment/Plan of Care Note       Active Substitute Decision Maker (SDM)    There are no active Substitute Decision Maker (SDM) on file.     Progress note:  SW received voice mail message from patient's daughter Daren stating that the discharge plan for the patient is now BEN and would like a referral to [facility]. SW confirmed with patient at bedside of the new discharge plan and per patient confirmed plan. NILA completed PASRR and requested DON and searched for [facility] in the referral database, but could not find out. NILA reached out to [facility] ( [phone]) and left a message for Jacque/Admissions regarding how to send a referral to their placement. SW to continue to follow.     Update: 9:50AM  NILA received notification that [facility] could not accomdate the patient's HD, NILA reached out to patient's daughter to inform her. NILA offered to send PAN/SNF list daughter. NILA sent list via secure e-mail. SW to follow up.     See SW/CM flowsheets for other objective data.    Disposition Recommendations:  Preliminary discharge destination:    SW/CM recommendation for discharge:      Discharge Plan/Needs:     Continued Care and Services - Admitted Since 9/8/2023     Durable Medical Equipment Coordination complete.    Service Provider Request Status Selected Services Address Phone Fax    [facility]  Selected Durable Medical Equipment [address] [id number] [phone] --                  Devices/ Equipment that need to be arranged for discharge:     Accepted   Pending insurance authorization   Others:    Anticipated date of DME availability:     Prior To Hospitalization:    Living Situation: Adult children, Family members and residing at House    .  Support Systems: Children, Family members   Home Devices/Equipment: Blood pressure monitor, Blood glucose monitor ,   ,    ,      Mobility Assist Devices: Cane, Standard walker   Type of Service Prior to  "Rosemarie Michel is a 48 year old female who is being evaluated via a billable telephone visit.      The patient has been notified of following:     \"This telephone visit will be conducted via a call between you and your physician/provider. We have found that certain health care needs can be provided without the need for a physical exam.  This service lets us provide the care you need with a short phone conversation.  If a prescription is necessary we can send it directly to your pharmacy.  If lab work is needed we can place an order for that and you can then stop by our lab to have the test done at a later time.    If during the course of the call the physician/provider feels a telephone visit is not appropriate, you will not be charged for this service.\"     Consent has been obtained for this service by 1 care team member: yes. See the scanned image in the medical record.    Rosemarie Michel complains of    Chief Complaint   Patient presents with     Recheck Medication       -Wanting to follow up with Celexa. She stopped taking it due to constipation. Wanting to see if she can be on the generic of Celexa and if that would help or if she has to be on a completley different medication. She does feel since stopping the Celexa she has been more crabby and mayers.     I have reviewed and updated the patient's Past Medical History, Social History, Family History and Medication List.    ALLERGIES  Latex and Shellfish allergy    Sotero Jolly CMA    Additional provider notes:   Always been prone to constipation ever since she was a kid  Her son has similar issues and was advised to try miralax which she started as well but didn't seem to help  Started drinking more water  Started exercising  Tried increasing fiber and taking more fiber tablets    When she gets her period it \"messes things up\" - will get loose stools    Really likes the celexa so doesn't want to change  Wondering if there is something else she can be " Hospitalization: Home care ,   ,   ,   ,    ,       Patient/Family discharge goal (s):        Resources provided:           Therapy Recommendations for Discharge:   PT:      Last Filed Values       Value Time User    PT Discharge Needs  therapy 5 or more times per week 9/14/2023 11:28 AM Kathy Olivarez, PT        OT:       Last Filed Values       Value Time User    OT Discharge Needs  therapy 5 or more times per week 9/14/2023  5:08 PM Noemí Soares, OTR/L        SLP:    Last Filed Values       Value Time User    SLP Discharge Needs  does not require ongoing therapy 9/12/2023  2:17 PM Hailee Ortega, SLP          Mobility Equipment Recommended for Discharge: pt owns      Barriers to Discharge  Identified Barriers to Discharge/Transition Planning: Medical necessity for acute care                 doing for the stools or if doing the non generic form of the drug would help    Would really like to restart hte celexa as now that she has been off it, she has really noticed a difference          Assessment/Plan:    ICD-10-CM    1. Adjustment disorder with mixed anxiety and depressed mood F43.23    2. Constipation, unspecified constipation type K59.00      Plan after our discussion today:  1. Continue celexa   2. Increase miralax to the full capful daily. Continue water, exercise, and fiber  3. Let me know in 2 weeks how things are going and if there are any improvements    I have reviewed the note as documented above.  This accurately captures the substance of my conversation with the patient.  Mervat Ulloa PA-C      Total time of call between patient and provider was 13 minutes     Mervat Ulloa PA-C

## 2023-12-19 ENCOUNTER — OFFICE VISIT (OUTPATIENT)
Dept: FAMILY MEDICINE | Facility: CLINIC | Age: 53
End: 2023-12-19
Payer: COMMERCIAL

## 2023-12-19 VITALS
TEMPERATURE: 98.5 F | HEIGHT: 63 IN | OXYGEN SATURATION: 97 % | HEART RATE: 103 BPM | BODY MASS INDEX: 35.97 KG/M2 | SYSTOLIC BLOOD PRESSURE: 136 MMHG | DIASTOLIC BLOOD PRESSURE: 74 MMHG | WEIGHT: 203 LBS

## 2023-12-19 DIAGNOSIS — Z12.11 SCREEN FOR COLON CANCER: ICD-10-CM

## 2023-12-19 DIAGNOSIS — E66.01 CLASS 2 SEVERE OBESITY DUE TO EXCESS CALORIES WITH SERIOUS COMORBIDITY AND BODY MASS INDEX (BMI) OF 35.0 TO 35.9 IN ADULT (H): ICD-10-CM

## 2023-12-19 DIAGNOSIS — I10 HYPERTENSION GOAL BP (BLOOD PRESSURE) < 140/90: ICD-10-CM

## 2023-12-19 DIAGNOSIS — Z12.31 VISIT FOR SCREENING MAMMOGRAM: ICD-10-CM

## 2023-12-19 DIAGNOSIS — Z00.00 ROUTINE GENERAL MEDICAL EXAMINATION AT A HEALTH CARE FACILITY: Primary | ICD-10-CM

## 2023-12-19 DIAGNOSIS — E66.812 CLASS 2 SEVERE OBESITY DUE TO EXCESS CALORIES WITH SERIOUS COMORBIDITY AND BODY MASS INDEX (BMI) OF 35.0 TO 35.9 IN ADULT (H): ICD-10-CM

## 2023-12-19 DIAGNOSIS — E78.5 HYPERLIPIDEMIA LDL GOAL <160: ICD-10-CM

## 2023-12-19 DIAGNOSIS — M70.61 TROCHANTERIC BURSITIS OF RIGHT HIP: ICD-10-CM

## 2023-12-19 DIAGNOSIS — Z11.59 NEED FOR HEPATITIS C SCREENING TEST: ICD-10-CM

## 2023-12-19 DIAGNOSIS — F41.1 GAD (GENERALIZED ANXIETY DISORDER): ICD-10-CM

## 2023-12-19 DIAGNOSIS — Z90.710 S/P HYSTERECTOMY: ICD-10-CM

## 2023-12-19 LAB
ANION GAP SERPL CALCULATED.3IONS-SCNC: 15 MMOL/L (ref 7–15)
BUN SERPL-MCNC: 13 MG/DL (ref 6–20)
CALCIUM SERPL-MCNC: 9.9 MG/DL (ref 8.6–10)
CHLORIDE SERPL-SCNC: 100 MMOL/L (ref 98–107)
CHOLEST SERPL-MCNC: 221 MG/DL
CREAT SERPL-MCNC: 0.51 MG/DL (ref 0.51–0.95)
DEPRECATED HCO3 PLAS-SCNC: 25 MMOL/L (ref 22–29)
EGFRCR SERPLBLD CKD-EPI 2021: >90 ML/MIN/1.73M2
FASTING STATUS PATIENT QL REPORTED: YES
GLUCOSE SERPL-MCNC: 119 MG/DL (ref 70–99)
HDLC SERPL-MCNC: 52 MG/DL
LDLC SERPL CALC-MCNC: 125 MG/DL
NONHDLC SERPL-MCNC: 169 MG/DL
POTASSIUM SERPL-SCNC: 4.6 MMOL/L (ref 3.4–5.3)
SODIUM SERPL-SCNC: 140 MMOL/L (ref 135–145)
TRIGL SERPL-MCNC: 218 MG/DL

## 2023-12-19 PROCEDURE — 99396 PREV VISIT EST AGE 40-64: CPT | Performed by: PHYSICIAN ASSISTANT

## 2023-12-19 PROCEDURE — 80061 LIPID PANEL: CPT | Performed by: PHYSICIAN ASSISTANT

## 2023-12-19 PROCEDURE — 86803 HEPATITIS C AB TEST: CPT | Performed by: PHYSICIAN ASSISTANT

## 2023-12-19 PROCEDURE — 80048 BASIC METABOLIC PNL TOTAL CA: CPT | Performed by: PHYSICIAN ASSISTANT

## 2023-12-19 PROCEDURE — 99214 OFFICE O/P EST MOD 30 MIN: CPT | Mod: 25 | Performed by: PHYSICIAN ASSISTANT

## 2023-12-19 PROCEDURE — 36415 COLL VENOUS BLD VENIPUNCTURE: CPT | Performed by: PHYSICIAN ASSISTANT

## 2023-12-19 RX ORDER — HYDROCHLOROTHIAZIDE 25 MG/1
12.5 TABLET ORAL DAILY
Qty: 135 TABLET | Refills: 1 | Status: SHIPPED | OUTPATIENT
Start: 2023-12-19

## 2023-12-19 ASSESSMENT — ENCOUNTER SYMPTOMS
SHORTNESS OF BREATH: 0
DYSURIA: 0
CHILLS: 0
JOINT SWELLING: 0
ARTHRALGIAS: 1
CONSTIPATION: 0
COUGH: 0
FEVER: 0
EYE PAIN: 0
HEMATOCHEZIA: 0
ABDOMINAL PAIN: 0
FREQUENCY: 0
DIZZINESS: 0
HEADACHES: 0
PARESTHESIAS: 0
NAUSEA: 0
WEAKNESS: 0
MYALGIAS: 1
HEARTBURN: 0
SORE THROAT: 0
HEMATURIA: 0
DIARRHEA: 0
NERVOUS/ANXIOUS: 1
PALPITATIONS: 0
BREAST MASS: 0

## 2023-12-19 NOTE — PROGRESS NOTES
SUBJECTIVE:   Rosemarie is a 53 year old, presenting for the following:  Physical        12/19/2023    10:14 AM   Additional Questions   Roomed by MICK Bey       Healthy Habits:     Getting at least 3 servings of Calcium per day:  NO    Bi-annual eye exam:  Yes    Dental care twice a year:  Yes    Sleep apnea or symptoms of sleep apnea:  None    Diet:  Regular (no restrictions)    Frequency of exercise:  1 day/week    Duration of exercise:  Less than 15 minutes    Taking medications regularly:  Yes    Medication side effects:  Not applicable    Additional concerns today:  Yes    - She would like to talk about her Citalopram.     -She has been taking her BP at home and it will sometimes run a little bit high.     -Her right hip has been bothering her the last few weeks but it seems to be getting better.     Have you ever done Advance Care Planning? (For example, a Health Directive, POLST, or a discussion with a medical provider or your loved ones about your wishes): No, advance care planning information given to patient to review.  Patient declined advance care planning discussion at this time.    Social History     Tobacco Use    Smoking status: Every Day     Packs/day: 0.25     Years: 21.00     Additional pack years: 0.00     Total pack years: 5.25     Types: Cigarettes    Smokeless tobacco: Never    Tobacco comments:     5-6 cigs per day   Substance Use Topics    Alcohol use: Yes     Alcohol/week: 0.0 standard drinks of alcohol     Comment:  5 Beers Through Whole Pregnancy           12/19/2023     7:58 AM   Alcohol Use   Prescreen: >3 drinks/day or >7 drinks/week? No     Reviewed orders with patient.  Reviewed health maintenance and updated orders accordingly - Yes  BP Readings from Last 3 Encounters:   12/19/23 136/74   11/02/22 (!) 156/97   09/27/21 130/70    Wt Readings from Last 3 Encounters:   12/19/23 92.1 kg (203 lb)   09/27/21 89.4 kg (197 lb)   10/01/20 84.8 kg (187 lb)                    Breast Cancer  Screening:    FHS-7:       10/27/2022     1:11 PM 12/19/2023     8:01 AM   Breast CA Risk Assessment (FHS-7)   Did any of your first-degree relatives have breast or ovarian cancer? Yes Yes   Did any of your relatives have bilateral breast cancer? No Unknown   Did any man in your family have breast cancer? No No   Did any woman in your family have breast and ovarian cancer? No Unknown   Did any woman in your family have breast cancer before age 50 y? No No   Do you have 2 or more relatives with breast and/or ovarian cancer? No No   Do you have 2 or more relatives with breast and/or bowel cancer? No No         Pertinent mammograms are reviewed under the imaging tab.    History of abnormal Pap smear: S/p hysterectomy in 2019 for benign reasons (dysfunctional uterine bleeding)- PAP no longer indicated      Latest Ref Rng & Units 2/19/2019    11:19 AM 2/19/2019    10:40 AM 1/25/2017    11:10 AM   PAP / HPV   PAP (Historical)  NIL   NIL    HPV 16 DNA NEG^Negative  Negative  Negative    HPV 18 DNA NEG^Negative  Negative  Negative    Other HR HPV NEG^Negative  Negative  Negative      Reviewed and updated as needed this visit by clinical staff    Allergies  Meds              Reviewed and updated as needed this visit by Provider                     Review of Systems   Constitutional:  Negative for chills and fever.   HENT:  Negative for congestion, ear pain, hearing loss and sore throat.    Eyes:  Negative for pain and visual disturbance.   Respiratory:  Negative for cough and shortness of breath.    Cardiovascular:  Negative for chest pain, palpitations and peripheral edema.   Gastrointestinal:  Negative for abdominal pain, constipation, diarrhea, heartburn, hematochezia and nausea.   Breasts:  Negative for tenderness, breast mass and discharge.   Genitourinary:  Positive for pelvic pain. Negative for dysuria, frequency, genital sores, hematuria, urgency, vaginal bleeding and vaginal discharge.   Musculoskeletal:  Positive  "for arthralgias and myalgias. Negative for joint swelling.   Skin:  Negative for rash.   Neurological:  Negative for dizziness, weakness, headaches and paresthesias.   Psychiatric/Behavioral:  Negative for mood changes. The patient is nervous/anxious.           OBJECTIVE:   /74   Pulse 103   Temp 98.5  F (36.9  C) (Tympanic)   Ht 1.607 m (5' 3.25\")   Wt 92.1 kg (203 lb)   LMP 04/16/2019   SpO2 97%   BMI 35.68 kg/m    Physical Exam  GENERAL: healthy, alert and no distress  EYES: Eyes grossly normal to inspection, PERRL and conjunctivae and sclerae normal  HENT: ear canals and TM's normal, nose and mouth without ulcers or lesions  NECK: no adenopathy, no asymmetry, masses, or scars and thyroid normal to palpation  RESP: lungs clear to auscultation - no rales, rhonchi or wheezes  BREAST: normal without masses, tenderness or nipple discharge and no palpable axillary masses or adenopathy  CV: regular rate and rhythm, normal S1 S2, no S3 or S4, no murmur, click or rub, no peripheral edema and peripheral pulses strong  ABDOMEN: soft, nontender, no hepatosplenomegaly, no masses and bowel sounds normal  MS: no gross musculoskeletal defects noted, no edema  Tender to palpation over greater trochanter, right side  SKIN: no suspicious lesions or rashes  NEURO: Normal strength and tone, mentation intact and speech normal  PSYCH: mentation appears normal, affect normal/bright    Diagnostic Test Results:  pending    ASSESSMENT/PLAN:   (Z00.00) Routine general medical examination at a health care facility  (primary encounter diagnosis)  Comment:   Plan:     (Z12.11) Screen for colon cancer  Comment:   Plan: Colonoscopy Screening  Referral            (Z11.59) Need for hepatitis C screening test  Comment:   Plan: Hepatitis C Screen Reflex to HCV RNA Quant and         Genotype            (E78.5) Hyperlipidemia LDL goal <160  Comment:   Plan: Lipid panel reflex to direct LDL Non-fasting            (I10) " Hypertension goal BP (blood pressure) < 140/90  Comment: readings at home have been similar to her readings obtained today in clinic. Tolerating medication well. Labs updated today. Medication refilled  Plan: BASIC METABOLIC PANEL, hydrochlorothiazide         (HYDRODIURIL) 25 MG tablet            (Z12.31) Visit for screening mammogram  Comment:   Plan: MA SCREENING DIGITAL BILAT - Future  (s+30)            (Z90.710) S/P hysterectomy  Comment:   Plan: pap no longer indicated as hysterectomy for benign reasons (abnormal bleeding)    (E66.01,  Z68.35) Class 2 severe obesity due to excess calories with serious comorbidity and body mass index (BMI) of 35.0 to 35.9 in adult (H)  Comment:   Plan: diet/exercise/lifestyle measures discussed    (M70.61) Trochanteric bursitis of right hip  Comment: consistent with bursitis  Plan: discussed stretches and exercise for bursitis. Follow up if not improving    (F41.1) DANII (generalized anxiety disorder)  Comment:   Plan: doing well on celexa. May want to consider tapering down in the spring but will continue at current dose for now        Patient has been advised of split billing requirements and indicates understanding: Yes      COUNSELING:  Reviewed preventive health counseling, as reflected in patient instructions        She reports that she has been smoking cigarettes. She has a 5.25 pack-year smoking history. She has never used smokeless tobacco.        Mervat Ulloa PA-C  [facility]

## 2023-12-20 PROBLEM — E66.812 CLASS 2 SEVERE OBESITY DUE TO EXCESS CALORIES WITH SERIOUS COMORBIDITY IN ADULT (H): Status: ACTIVE | Noted: 2023-12-20

## 2023-12-20 PROBLEM — E66.01 CLASS 2 SEVERE OBESITY DUE TO EXCESS CALORIES WITH SERIOUS COMORBIDITY IN ADULT (H): Status: ACTIVE | Noted: 2023-12-20

## 2023-12-20 LAB — HCV AB SERPL QL IA: NONREACTIVE

## 2024-01-05 ENCOUNTER — VIRTUAL VISIT (OUTPATIENT)
Dept: FAMILY MEDICINE | Facility: CLINIC | Age: 54
End: 2024-01-05
Payer: COMMERCIAL

## 2024-01-05 DIAGNOSIS — H10.31 ACUTE BACTERIAL CONJUNCTIVITIS OF RIGHT EYE: Primary | ICD-10-CM

## 2024-01-05 PROCEDURE — 99213 OFFICE O/P EST LOW 20 MIN: CPT | Mod: 95 | Performed by: PHYSICIAN ASSISTANT

## 2024-01-05 RX ORDER — POLYMYXIN B SULFATE AND TRIMETHOPRIM 1; 10000 MG/ML; [USP'U]/ML
SOLUTION OPHTHALMIC
Qty: 10 ML | Refills: 0 | Status: SHIPPED | OUTPATIENT
Start: 2024-01-05 | End: 2024-01-12

## 2024-01-05 NOTE — PROGRESS NOTES
"    Instructions Relayed to Patient by Virtual Roomer:     Patient is active on REPUBLIC RESOURCES:   Relayed following to patient: \"It looks like you are active on REPUBLIC [employer], are you able to join the visit this way? If not, do you need us to send you a link now or would you like your provider to send a link via text or email when they are ready to initiate the visit?\"    Reminded patient to ensure they were logged on to virtual visit by arrival time listed. Documented in appointment notes if patient had flexibility to initiate visit sooner than arrival time. If pediatric virtual visit, ensured pediatric patient along with parent/guardian will be present for video visit.     Patient offered the website [email or web address] and/or phone number to [employer] Help line: [phone]    Rosemarie is a 53 year old who is being evaluated via a billable video visit.      How would you like to obtain your AVS? [email or web address]  If the video visit is dropped, the invitation should be resent by: Text to cell phone: [phone]  Will anyone else be joining your video visit? No          Assessment & Plan     (H10.31) Acute bacterial conjunctivitis of right eye  (primary encounter diagnosis)  Comment: Discussed with patient covering with antibiotics for possible acute bacterial conjunctivitis.  Patient was having eye irritation and redness prior to exposure to hydrogen peroxide.  She did have exposure to a child who had pinkeye correlated with onset of symptoms.  Extraocular movements are intact.  I discussed with the patient should she not have improvement within the next 2 to 3 days to seek evaluation with ophthalmology.  I also advised the patient no further usage of hydroperoxide near her eye.  Patient expressed understanding.  Not a contact lens wear  Plan: polymixin b-trimethoprim (POLYTRIM) 53566-1.1         UNIT/ML-% ophthalmic solution                     BMI:   Estimated body mass index is 35.68 kg/m  as calculated from the " "following:    Height as of 12/19/23: 1.607 m (5' 3.25\").    Weight as of 12/19/23: 92.1 kg (203 lb).           SKYLAR [facility]   Rosemarie is a 53 year old, presenting for the following health issues:  No chief complaint on file.        1/5/2024    11:29 AM   Additional Questions   Roomed by puja   Accompanied by self       History of Present Illness       Reason for visit:  Eye problem        Eye(s) Problem  Onset/Duration: 2- 3 days  Description:   Location: Right   Pain: YES - giving here a headache, feels like something in it  Redness: YES  Accompanying Signs & Symptoms:  Discharge/mattering: No - feels kind of sticky  Swelling: No  Visual changes: YES  Fever: No  Nasal Congestion: No  Bothered by bright lights: YES  History:  Trauma: No  Foreign body exposure: YES - hydrogen peroxide 3 days ago   Wearing contacts: No  Precipitating or alleviating factors: None  Therapies tried and outcome: None    Patient had a neighbor's son come over who had pinkeye.  Has been having a little redness and irritation of the eye.  Further, the patient was trying to put some hydrogen peroxide.  Eyelid stye and is uncertain if she had gotten any fluid within her eye.  This occurred 3 days ago.  Was having symptoms of eye irritation prior to hydroperoxide.        Review of Systems   Constitutional, HEENT, cardiovascular, pulmonary, gi and gu systems are negative, except as otherwise noted.      Objective           Vitals:  No vitals were obtained today due to virtual visit.    Physical Exam   GENERAL: Healthy, alert and no distress  EYES: EOMI and conjunctival injection of right lower eye on examination.  No evidence of stye.  RESP: No audible wheeze, cough, or visible cyanosis.  No visible retractions or increased work of breathing.    SKIN: Visible skin clear. No significant rash, abnormal pigmentation or lesions.  NEURO: Cranial nerves grossly intact.  Mentation and speech " appropriate for age.  PSYCH: Mentation appears normal, affect normal/bright, judgement and insight intact, normal speech and appearance well-groomed.                Video-Visit Details    Type of service:  Video Visit     Originating Location (pt. Location): Home    Distant Location (provider location):  On-site  Platform used for Video Visit: Derrick

## 2024-01-19 ENCOUNTER — TELEPHONE (OUTPATIENT)
Dept: OPHTHALMOLOGY | Facility: CLINIC | Age: 54
End: 2024-01-19
Payer: COMMERCIAL

## 2024-01-19 DIAGNOSIS — H10.9 BACTERIAL CONJUNCTIVITIS OF RIGHT EYE: Primary | ICD-10-CM

## 2024-01-19 RX ORDER — POLYMYXIN B SULFATE AND TRIMETHOPRIM 1; 10000 MG/ML; [USP'U]/ML
1 SOLUTION OPHTHALMIC 3 TIMES DAILY
COMMUNITY

## 2024-01-19 RX ORDER — ERYTHROMYCIN 5 MG/G
0.5 OINTMENT OPHTHALMIC AT BEDTIME
Qty: 3.5 G | Refills: 0 | Status: SHIPPED | OUTPATIENT
Start: 2024-01-19

## 2024-01-19 NOTE — TELEPHONE ENCOUNTER
Spoke to pt - understands plan as recommended by Dr. Wilson - scheduled appt 1/24 with Dr. Wilson. See notes below.     Have her resume her Polytrim drops three times a day in the affected eye and we could also send over some erythromycin ointment at bedtime to her pharmacy. We can see her next Tuesday or Wednesday.    Thanks!

## 2024-01-19 NOTE — TELEPHONE ENCOUNTER
Onset of constant tearing, worsened in AM and redness right eye with slight RUL swelling, believes possible stye, intermittent film like vision since end of December nasal>temporal  - had virtual visit 1/5/2024. No itching, no discharge, no FBS. Hx styes.     Started Polytrim right eye 5x/day x 1 wk - finished 1/12 - slowly tapering off the last day, using the drop twice.     Redness, swelling RUL resolved, slight tearing remained.     Redness and tearing returned again 3 days ago. No RUL swelling. Will discuss findings with Dr. Wilson. Ok to leave voice message with plan.     Pharmacy: [facility] [facility].

## 2024-02-03 ENCOUNTER — HEALTH MAINTENANCE LETTER (OUTPATIENT)
Age: 54
End: 2024-02-03

## 2024-03-06 ENCOUNTER — E-VISIT (OUTPATIENT)
Dept: URGENT CARE | Facility: CLINIC | Age: 54
End: 2024-03-06
Payer: COMMERCIAL

## 2024-03-06 DIAGNOSIS — R06.02 SOB (SHORTNESS OF BREATH): Primary | ICD-10-CM

## 2024-03-06 DIAGNOSIS — J06.9 VIRAL URI WITH COUGH: ICD-10-CM

## 2024-03-06 PROCEDURE — 99207 PR NON-BILLABLE SERV PER CHARTING: CPT | Performed by: EMERGENCY MEDICINE

## 2024-04-04 ENCOUNTER — MYC MEDICAL ADVICE (OUTPATIENT)
Dept: FAMILY MEDICINE | Facility: CLINIC | Age: 54
End: 2024-04-04
Payer: COMMERCIAL

## 2024-04-04 DIAGNOSIS — F43.23 ADJUSTMENT DISORDER WITH MIXED ANXIETY AND DEPRESSED MOOD: ICD-10-CM

## 2024-04-04 RX ORDER — CITALOPRAM HYDROBROMIDE 20 MG/1
30 TABLET ORAL DAILY
Qty: 135 TABLET | Refills: 3 | Status: SHIPPED | OUTPATIENT
Start: 2024-04-04

## 2024-10-08 ENCOUNTER — TRANSFERRED RECORDS (OUTPATIENT)
Dept: HEALTH INFORMATION MANAGEMENT | Facility: CLINIC | Age: 54
End: 2024-10-08
Payer: COMMERCIAL

## 2024-11-13 DIAGNOSIS — Z12.11 ENCOUNTER FOR SCREENING FOR MALIGNANT NEOPLASM OF COLON: Primary | ICD-10-CM

## 2024-12-16 ENCOUNTER — ANCILLARY PROCEDURE (OUTPATIENT)
Dept: MAMMOGRAPHY | Facility: CLINIC | Age: 54
End: 2024-12-16
Attending: PHYSICIAN ASSISTANT
Payer: COMMERCIAL

## 2024-12-16 DIAGNOSIS — Z12.31 VISIT FOR SCREENING MAMMOGRAM: ICD-10-CM

## 2025-02-02 ENCOUNTER — HEALTH MAINTENANCE LETTER (OUTPATIENT)
Age: 55
End: 2025-02-02

## 2025-02-06 ENCOUNTER — HOSPITAL ENCOUNTER (OUTPATIENT)
Facility: CLINIC | Age: 55
End: 2025-02-06
Attending: SURGERY | Admitting: SURGERY
Payer: COMMERCIAL

## 2025-02-06 ENCOUNTER — TELEPHONE (OUTPATIENT)
Dept: GASTROENTEROLOGY | Facility: CLINIC | Age: 55
End: 2025-02-06
Payer: COMMERCIAL

## 2025-02-06 NOTE — TELEPHONE ENCOUNTER
Endoscopy Scheduling Screen      What insurance is in the chart?  Other:  BCBS    Ordering/Referring Provider: Mervat Ulloa   (If ordering provider performs procedure, schedule with ordering provider unless otherwise instructed. )    BMI: There is no height or weight on file to calculate BMI.     Sedation Ordered  general anesthesia.   BMI<= 45 45 < BMI <= 48 48 < BMI < = 50  BMI > 50   No Restrictions No [facility]  No [facility]  [facility] with exceptions Hospital Only OR Only       Do you have a history of malignant hyperthermia?  NO    (Females) Are you currently pregnant?   NO     Are you currently on dialysis?   NO    Do you need assistance transferring?   NO    BMI: There is no height or weight on file to calculate BMI.     Is patients BMI > 50?  NO    BMI > 40?  NO    Do you have a diagnosis of diabetes?  NO    Do you take an Oral or Injectable medication for weight loss or diabetes (excluding insulin)?  NO    Do you take the medication Naltrexone?  NO    Do you take blood thinners?  NO    Prep   Are you currently have chronic kidney disease?  NO    Do you have a diagnosis of cystic fibrosis (CF)?  NO    On a regular basis do you go 3 -5 days between bowel movements?  NO    Preferred Pharmacy:    [id number] - [address]  [facility] & [address] [place] [address]  Phone: [phone] Fax: [phone]    [facility] - [address] [address]  Phone: [phone] Fax: [phone]      Final Scheduling Details     Procedure scheduled  Colonoscopy    Surgeon:  Blanca     Date of procedure:  4-11-25     Location  [place] - Patient preference.    What is your communication preference for Instructions and/or Bowel Prep?   MyChart    Patient Reminders:    You will receive a call from a Nurse to review instructions and health history.  This assessment must be  completed prior to your procedure.  Failure to complete the Nurse assessment may result in the procedure being cancelled.       On the day of your procedure, please designate an adult(s) who can drive you home stay with you for the next 24 hours. The medicines used in the exam will make you sleepy. You will not be able to drive.       You cannot take public transportation, ride share services, or non-medical taxi service without a responsible caregiver.  Medical transport services are allowed with the requirement that a responsible caregiver will receive you at your destination.  We require that drivers and caregivers are confirmed prior to your procedure.

## 2025-03-24 RX ORDER — SODIUM CHLORIDE, SODIUM LACTATE, POTASSIUM CHLORIDE, CALCIUM CHLORIDE 600; 310; 30; 20 MG/100ML; MG/100ML; MG/100ML; MG/100ML
INJECTION, SOLUTION INTRAVENOUS CONTINUOUS
Status: CANCELLED | OUTPATIENT
Start: 2025-03-24

## 2025-03-24 RX ORDER — LIDOCAINE 40 MG/G
CREAM TOPICAL
Status: CANCELLED | OUTPATIENT
Start: 2025-03-24

## 2025-04-07 ENCOUNTER — MYC REFILL (OUTPATIENT)
Dept: FAMILY MEDICINE | Facility: CLINIC | Age: 55
End: 2025-04-07
Payer: COMMERCIAL

## 2025-04-07 DIAGNOSIS — I10 HYPERTENSION GOAL BP (BLOOD PRESSURE) < 140/90: ICD-10-CM

## 2025-04-08 RX ORDER — HYDROCHLOROTHIAZIDE 25 MG/1
12.5 TABLET ORAL DAILY
Qty: 15 TABLET | Refills: 0 | Status: SHIPPED | OUTPATIENT
Start: 2025-04-08

## 2025-04-14 ENCOUNTER — OFFICE VISIT (OUTPATIENT)
Dept: PODIATRY | Facility: CLINIC | Age: 55
End: 2025-04-14
Payer: COMMERCIAL

## 2025-04-14 VITALS — HEIGHT: 63 IN | WEIGHT: 203 LBS | BODY MASS INDEX: 35.97 KG/M2

## 2025-04-14 DIAGNOSIS — L60.0 INGROWN NAIL OF GREAT TOE OF RIGHT FOOT: Primary | ICD-10-CM

## 2025-04-14 PROCEDURE — 99203 OFFICE O/P NEW LOW 30 MIN: CPT | Mod: 25 | Performed by: PODIATRIST

## 2025-04-14 PROCEDURE — 11750 EXCISION NAIL&NAIL MATRIX: CPT | Mod: T5 | Performed by: PODIATRIST

## 2025-04-14 RX ORDER — BACITRACIN ZINC 500 [USP'U]/G
OINTMENT TOPICAL DAILY
Qty: 15 G | Refills: 0 | Status: SHIPPED | OUTPATIENT
Start: 2025-04-14

## 2025-04-14 NOTE — PROGRESS NOTES
"Rosemarie Michel is a 55 year old female who presents with a chief complaint of a painful ingrown toenail to the right great toe.  The patient relates pain when wearing shoes.  The patient denies any redness extending up the big toe into the foot.  The patient relates the condition has been getting worse over the past several weeks.         Pertinent medical, surgical and family history was reviewed in the chart.    Vitals: Ht 1.6 m (5' 3\")   Wt 92.1 kg (203 lb)   LMP 04/16/2019   BMI 35.96 kg/m    BMI= Body mass index is 35.96 kg/m .    LOWER EXTREMITY PHYSICAL EXAM    Dermatologic: One notes an inflamed nail border of the right great toe.  There is noted erythema and edema located around the labial fold and does not extend past the interphalangeal joint.  There is no apparent purulent drainage noted.  There is pain on palpation to the proximal aspect of the nail border.  Otherwise, the skin is intact to both lower extremities without significant lesions, rash or abrasion.           Vascular: DP & PT pulses are intact & regular on the right.   CFT and skin temperature is normal to the right lower extremities.     Neurologic: Lower extremity sensation is intact to light touch.  No evidence of weakness in the right lower extremities.        Musculoskeletal: Patient is ambulatory without assistive device or brace.  No gross ankle deformity noted.  No foot or ankle joint effusion is noted.         ASSESSMENT / PLAN:     ICD-10-CM    1. Ingrown nail of great toe of right foot  L60.0 REMOVAL NAIL/NAIL BED, PARTIAL OR COMPLETE     bacitracin 500 UNIT/GM external ointment          Plan:  I have explained to Rosemarie about the condition.  The potential causes and nature of an ingrown toenail were discussed with the patient.  We reviewed the natural history and prognosis of the condition and potential risks if no treatment is provided.  Treatment options discussed included conservative management (oral antibiotics, soaking " of foot, adequate width shoes)  as well as surgical management (partial or total nail removal).  The pros and cons of both forms as well as risks and benefits of treatment were reviewed.       At this point, I recommended having the offending nail border permanently removed from the right great toe.  I have explained to the patient all of the possible risks, benefits, alternatives to the procedure.  The patient consented to the proposed procedure.  The right hallux was swabbed with alcohol.  Next, approximately 3 cc of 1% lidocaine plain was injected around the right hallux.  The right hallux was then prepped with Betadine ointment.  A tourniquet was applied to the right hallux.  A [place] elevator was utilized to free up the eponychium of the offending nail border.  Next, the offending nail border was split using an English anvil back to the matrix.  Next, utilizing a straight hemostat, the offending nail border was avulsed in toto.  The wound bed was debrided on any remaining nail and hyperkeratotic skin.  Next, the offending nail matrix was treated with 89% phenol using microtip cotton applicators for 30 seconds.  The wound was then irrigated and dressed with bacitracin antibiotic ointment and a compressive  sterile dressing.  The tourniquet was removed and a prompt hyperemic response was noted.  The patient tolerated the procedure well with no complications.  The patient was given post procedure instructions for the care of the wound.  The patient was informed that it is common to experience redness with watery drainage coming from the treated areas of the toe related to the phenol application.  The patient may return for reevaluation and was instructed to notify the office if any redness extending past the big toe joint or fever with chills are experienced before then.      The patient was prescribed Bacitracin topical antibiotic ointment to be applied to the affected toe after soaking to clear up infection.       There is low risk of morbidity with the procedure.  There was no overlap in work associated with the evaluation/management and the work associated with the procedure.    Rosemarie verbalized agreement with and understanding of the rational for the diagnosis and treatment plan.  All questions were answered to best of my ability and the patient's satisfaction. The patient was advised to contact the clinic with any questions that may arise after the clinic visit.      Disclaimer: This note consists of symbols derived from keyboarding, dictation and/or voice recognition software. As a result, there may be errors in the script that have gone undetected. Please consider this when interpreting information found in this chart.      LUZ MARIA Roy D.P.M., F.IVAN.BEN.F.A.S.

## 2025-04-14 NOTE — NURSING NOTE
"Chief Complaint   Patient presents with    Ingrown Toenail     Great right toenail curling       Initial Ht 1.6 m (5' 3\")   Wt 92.1 kg (203 lb)   LMP 04/16/2019   BMI 35.96 kg/m   Estimated body mass index is 35.96 kg/m  as calculated from the following:    Height as of this encounter: 1.6 m (5' 3\").    Weight as of this encounter: 92.1 kg (203 lb).  Medications and allergies reviewed.      Gabby WOODARD MA    "

## 2025-04-14 NOTE — LETTER
"4/14/2025      Rosemarie Michel  [address]  [facility] [address]      Dear Colleague,    Thank you for referring your patient, Rosemarie Michel, to the [facility]. Please see a copy of my visit note below.    Rosemarie Michel is a 55 year old female who presents with a chief complaint of a painful ingrown toenail to the right great toe.  The patient relates pain when wearing shoes.  The patient denies any redness extending up the big toe into the foot.  The patient relates the condition has been getting worse over the past several weeks.         Pertinent medical, surgical and family history was reviewed in the chart.    Vitals: Ht 1.6 m (5' 3\")   Wt 92.1 kg (203 lb)   LMP 04/16/2019   BMI 35.96 kg/m    BMI= Body mass index is 35.96 kg/m .    LOWER EXTREMITY PHYSICAL EXAM    Dermatologic: One notes an inflamed nail border of the right great toe.  There is noted erythema and edema located around the labial fold and does not extend past the interphalangeal joint.  There is no apparent purulent drainage noted.  There is pain on palpation to the proximal aspect of the nail border.  Otherwise, the skin is intact to both lower extremities without significant lesions, rash or abrasion.           Vascular: DP & PT pulses are intact & regular on the right.   CFT and skin temperature is normal to the right lower extremities.     Neurologic: Lower extremity sensation is intact to light touch.  No evidence of weakness in the right lower extremities.        Musculoskeletal: Patient is ambulatory without assistive device or brace.  No gross ankle deformity noted.  No foot or ankle joint effusion is noted.         ASSESSMENT / PLAN:     ICD-10-CM    1. Ingrown nail of great toe of right foot  L60.0 REMOVAL NAIL/NAIL BED, PARTIAL OR COMPLETE     bacitracin 500 UNIT/GM external ointment          Plan:  I have explained to Rosemarie about the condition.  The potential causes and " nature of an ingrown toenail were discussed with the patient.  We reviewed the natural history and prognosis of the condition and potential risks if no treatment is provided.  Treatment options discussed included conservative management (oral antibiotics, soaking of foot, adequate width shoes)  as well as surgical management (partial or total nail removal).  The pros and cons of both forms as well as risks and benefits of treatment were reviewed.       At this point, I recommended having the offending nail border permanently removed from the right great toe.  I have explained to the patient all of the possible risks, benefits, alternatives to the procedure.  The patient consented to the proposed procedure.  The right hallux was swabbed with alcohol.  Next, approximately 3 cc of 1% lidocaine plain was injected around the right hallux.  The right hallux was then prepped with Betadine ointment.  A tourniquet was applied to the right hallux.  A [place] elevator was utilized to free up the eponychium of the offending nail border.  Next, the offending nail border was split using an English anvil back to the matrix.  Next, utilizing a straight hemostat, the offending nail border was avulsed in toto.  The wound bed was debrided on any remaining nail and hyperkeratotic skin.  Next, the offending nail matrix was treated with 89% phenol using microtip cotton applicators for 30 seconds.  The wound was then irrigated and dressed with bacitracin antibiotic ointment and a compressive  sterile dressing.  The tourniquet was removed and a prompt hyperemic response was noted.  The patient tolerated the procedure well with no complications.  The patient was given post procedure instructions for the care of the wound.  The patient was informed that it is common to experience redness with watery drainage coming from the treated areas of the toe related to the phenol application.  The patient may return for reevaluation and was instructed  to notify the office if any redness extending past the big toe joint or fever with chills are experienced before then.      The patient was prescribed Bacitracin topical antibiotic ointment to be applied to the affected toe after soaking to clear up infection.      There is low risk of morbidity with the procedure.  There was no overlap in work associated with the evaluation/management and the work associated with the procedure.    Rosemarie verbalized agreement with and understanding of the rational for the diagnosis and treatment plan.  All questions were answered to best of my ability and the patient's satisfaction. The patient was advised to contact the clinic with any questions that may arise after the clinic visit.      Disclaimer: This note consists of symbols derived from keyboarding, dictation and/or voice recognition software. As a result, there may be errors in the script that have gone undetected. Please consider this when interpreting information found in this chart.      VANCE Rosenthal.P.M., F.A.C.F.A.S.      Again, thank you for allowing me to participate in the care of your patient.        Sincerely,        Emigdio Roy DPM    Electronically signed

## 2025-04-15 ENCOUNTER — VIRTUAL VISIT (OUTPATIENT)
Dept: URGENT CARE | Facility: CLINIC | Age: 55
End: 2025-04-15
Payer: COMMERCIAL

## 2025-04-15 DIAGNOSIS — J01.10 ACUTE NON-RECURRENT FRONTAL SINUSITIS: Primary | ICD-10-CM

## 2025-04-15 PROCEDURE — 98001 SYNCH AUDIO-VIDEO NEW LOW 30: CPT

## 2025-04-15 NOTE — PROGRESS NOTES
Assessment & Plan       ICD-10-CM    1. Acute non-recurrent frontal sinusitis  J01.10 amoxicillin-clavulanate (AUGMENTIN) 875-125 MG tablet           Assessment & Plan     Acute non-recurrent frontal sinusitis:  - Likely transitioned from a viral to a bacterial sinus infection due to persistent symptoms for two weeks. Azithromycin was ineffective for sinus infection.  - Prescribe Augmentin for bacterial sinus infection. Continue current sinus treatments for 24-48 hours until the antibiotic takes effect. Recommend trying Flonase for two weeks to see if it helps with sinus and allergy symptoms.             No follow-ups on file.    At the end of the encounter, I discussed results, diagnosis, medications. Discussed red flags for immediate return to clinic/ER, as well as indications for follow up if no improvement. Patient understood and agreed to plan. Patient was stable for discharge.    Subjective     Rosemarie is a 55 year old female who presents to clinic today the following health issues:    History of Present Illness-  - Rosemarie Nath, 55-year-old female.  - Experienced influenza and RSV for approximately two weeks prior to the encounter.  - Received a Z-Remy and two nebulizer steroid treatments at urgent care.  - RSV symptoms improving, with minimal coughing and shortness of breath.  - Persistent crushing headache and sinus and dental pain for two weeks, exacerbated by coughing.  - History of vertigo for 15 years, with symptoms influenced by barometric pressure changes and seasonal transitions, typically in October and either March or April.  - Switched from Claritin to Zyrtec at urgent care, which initially helped with vertigo symptoms, but symptoms have started to return.     ROS: Pertinent ROS neg other than the symptoms noted above in the HPI.     Problem List:  2023-12: Class 2 severe obesity due to excess calories with serious   comorbidity in adult (H)  2018-08: Myofascial pain  2018-08: Migraine  2018-08:  Arthralgia of temporomandibular joint  2018-02: Hypertension goal BP (blood pressure) < 140/90  2017-10: BPPV (benign paroxysmal positional vertigo)  2016-01: Excessive bleeding in premenopausal period  2014-01: Hyperlipidemia LDL goal <160  2009-12: Tobacco use disorder      Past Medical History:   Diagnosis Date    Abnormal Pap smear 2000    , normal paps since    BPPV (benign paroxysmal positional vertigo)     Cervical disc disorder     Chalazion of right upper eyelid     DUB (dysfunctional uterine bleeding)     Hyperlipidemia     Mastodynia sept 2014    left breast  tenderness    Menorrhagia     Motion sickness     Pregnancy induced hypertension     Ureteral duplication, bilateral 2019    noted during cystoscopy at time of hysterectomy       Social History     Tobacco Use    Smoking status: Every Day     Current packs/day: 0.50     Average packs/day: 0.5 packs/day for 21.0 years (10.5 ttl pk-yrs)     Types: Cigarettes    Smokeless tobacco: Never    Tobacco comments:     5-6 cigs per day   Substance Use Topics    Alcohol use: Yes     Alcohol/week: 0.0 standard drinks of alcohol     Comment:  5 Beers Through Whole Pregnancy               OBJECTIVE:  Vitals not done due to this being a virtual visit    GENERAL: healthy, alert and no distress  EYES: Eyes grossly normal to inspection,conjunctivae and sclerae normal  RESP: Able to speak in complete sentences, no audible wheeze or cough  SKIN: no suspicious lesions or rashes  NEURO: mentation intact and speech normal  PSYCH: mentation appears normal, affect normal/bright    Video-Visit Details    Type of service:  Video Visit  Video Start Time: 1100  Video End Time: 1110    Originating Location: Home    Distant Location:  [facility]     Platform used for Video Visit: CHENCHO Wall CNP

## 2025-04-19 ENCOUNTER — HEALTH MAINTENANCE LETTER (OUTPATIENT)
Age: 55
End: 2025-04-19

## 2025-04-20 ENCOUNTER — ANESTHESIA EVENT (OUTPATIENT)
Dept: GASTROENTEROLOGY | Facility: CLINIC | Age: 55
End: 2025-04-20
Payer: COMMERCIAL

## 2025-04-20 RX ORDER — ONDANSETRON 4 MG/1
4 TABLET, ORALLY DISINTEGRATING ORAL EVERY 30 MIN PRN
Status: CANCELLED | OUTPATIENT
Start: 2025-04-20

## 2025-04-20 RX ORDER — ONDANSETRON 2 MG/ML
4 INJECTION INTRAMUSCULAR; INTRAVENOUS EVERY 30 MIN PRN
Status: CANCELLED | OUTPATIENT
Start: 2025-04-20

## 2025-04-20 ASSESSMENT — LIFESTYLE VARIABLES: TOBACCO_USE: 1

## 2025-04-20 NOTE — ANESTHESIA PREPROCEDURE EVALUATION
Anesthesia Pre-Procedure Evaluation    Patient: Rosemarie Michel   MRN: [id number] : 1970        Procedure : Procedure(s):  Colonoscopy          Past Medical History:   Diagnosis Date    Abnormal Pap smear     , normal paps since    BPPV (benign paroxysmal positional vertigo)     Cervical disc disorder     Chalazion of right upper eyelid     DUB (dysfunctional uterine bleeding)     Hyperlipidemia     Mastodynia 2014    left breast  tenderness    Menorrhagia     Motion sickness     Pregnancy induced hypertension     Ureteral duplication, bilateral 2019    noted during cystoscopy at time of hysterectomy      Past Surgical History:   Procedure Laterality Date    AS HYSTEROSCOPY, SURGICAL; W/ ENDOMETRIAL ABLATION, ANY METHOD      CYSTOSCOPY N/A 2019    Procedure: CYSTOSCOPY;  Surgeon: Yolande Jackson MD;  Location:  OR    GYN SURGERY      LEEP, c. section    HYSTERECTOMY  2019    PAP no longer indicated    LAPAROSCOPIC HYSTERECTOMY SUPRACERVICAL N/A 2019    Procedure: LAPAROSCOPIC SUPRACERVICAL HYSTERECTOMY,POWERED MORCELLATION IN A BAG (LATEX ALLERGY: CONTACT ALLERGY);  Surgeon: Yolande Jackson MD;  Location:  OR    LAPAROSCOPIC SALPINGECTOMY Bilateral 2019    Procedure: BILATERAL LAPAROSCOPIC SALPINGECTOMY;  Surgeon: Yolande Jackson MD;  Location:  OR      Allergies   Allergen Reactions    Bee Venom Anaphylaxis    Latex     Shellfish Allergy       Social History     Tobacco Use    Smoking status: Every Day     Current packs/day: 0.50     Average packs/day: 0.5 packs/day for 21.0 years (10.5 ttl pk-yrs)     Types: Cigarettes    Smokeless tobacco: Never    Tobacco comments:     5-6 cigs per day   Substance Use Topics    Alcohol use: Yes     Alcohol/week: 0.0 standard drinks of alcohol     Comment:  5 Beers Through Whole Pregnancy      Wt Readings from Last 1 Encounters:   25 92.1 kg (203 lb)        Anesthesia Evaluation   Pt has had prior anesthetic. Type:  "General and MAC.    History of anesthetic complications  - motion sickness.      ROS/MED HX  ENT/Pulmonary:     (+)                tobacco use, Current use,                       Neurologic: Comment: BPPV (benign paroxysmal positional vertigo)    (+)      migraines,                          Cardiovascular:     (+) Dyslipidemia hypertension- -   -  - -                                      METS/Exercise Tolerance:     Hematologic:       Musculoskeletal:       GI/Hepatic:     (+)        bowel prep,            Renal/Genitourinary:       Endo:     (+)               Obesity,       Psychiatric/Substance Use:       Infectious Disease:       Malignancy:       Other:               OUTSIDE LABS:  CBC:   Lab Results   Component Value Date    WBC 12.1 (H) 09/27/2021    WBC 9.8 03/06/2019    HGB 14.3 09/27/2021    HGB 14.5 03/06/2019    HCT 41.8 09/27/2021    HCT 42.7 03/06/2019     09/27/2021     03/06/2019     BMP:   Lab Results   Component Value Date     12/19/2023     09/27/2021    POTASSIUM 4.6 12/19/2023    POTASSIUM 4.1 09/27/2021    CHLORIDE 100 12/19/2023    CHLORIDE 108 09/27/2021    CO2 25 12/19/2023    CO2 26 09/27/2021    BUN 13.0 12/19/2023    BUN 14 09/27/2021    CR 0.51 12/19/2023    CR 0.72 09/27/2021     (H) 12/19/2023     (H) 09/27/2021     COAGS: No results found for: \"PTT\", \"INR\", \"FIBR\"  POC: No results found for: \"BGM\", \"HCG\", \"HCGS\"  HEPATIC:   Lab Results   Component Value Date    ALBUMIN 4.5 02/19/2019    PROTTOTAL 8.0 02/19/2019    ALT 22 02/19/2019    AST 14 02/19/2019    ALKPHOS 65 02/19/2019    BILITOTAL 0.5 02/19/2019     OTHER:   Lab Results   Component Value Date    VARUN 9.9 12/19/2023    TSH 1.24 10/15/2018              CHENCHO Carrizales CRNA    Clinically Significant Risk Factors Present on Admission                   # Hypertension: Noted on problem list           # Obesity: Estimated body mass index is 35.96 kg/m  as calculated from the following:    " "Height as of 4/14/25: 1.6 m (5' 3\").    Weight as of 4/14/25: 92.1 kg (203 lb).                "

## 2025-05-01 ASSESSMENT — ANXIETY QUESTIONNAIRES
5. BEING SO RESTLESS THAT IT IS HARD TO SIT STILL: MORE THAN HALF THE DAYS
7. FEELING AFRAID AS IF SOMETHING AWFUL MIGHT HAPPEN: NOT AT ALL
2. NOT BEING ABLE TO STOP OR CONTROL WORRYING: SEVERAL DAYS
7. FEELING AFRAID AS IF SOMETHING AWFUL MIGHT HAPPEN: NOT AT ALL
8. IF YOU CHECKED OFF ANY PROBLEMS, HOW DIFFICULT HAVE THESE MADE IT FOR YOU TO DO YOUR WORK, TAKE CARE OF THINGS AT HOME, OR GET ALONG WITH OTHER PEOPLE?: SOMEWHAT DIFFICULT
IF YOU CHECKED OFF ANY PROBLEMS ON THIS QUESTIONNAIRE, HOW DIFFICULT HAVE THESE PROBLEMS MADE IT FOR YOU TO DO YOUR WORK, TAKE CARE OF THINGS AT HOME, OR GET ALONG WITH OTHER PEOPLE: SOMEWHAT DIFFICULT
3. WORRYING TOO MUCH ABOUT DIFFERENT THINGS: SEVERAL DAYS
GAD7 TOTAL SCORE: 11
4. TROUBLE RELAXING: NEARLY EVERY DAY
GAD7 TOTAL SCORE: 11
6. BECOMING EASILY ANNOYED OR IRRITABLE: NEARLY EVERY DAY
1. FEELING NERVOUS, ANXIOUS, OR ON EDGE: SEVERAL DAYS
GAD7 TOTAL SCORE: 11

## 2025-05-02 ENCOUNTER — ANESTHESIA (OUTPATIENT)
Dept: GASTROENTEROLOGY | Facility: CLINIC | Age: 55
End: 2025-05-02
Payer: COMMERCIAL

## 2025-05-05 ASSESSMENT — PATIENT HEALTH QUESTIONNAIRE - PHQ9
10. IF YOU CHECKED OFF ANY PROBLEMS, HOW DIFFICULT HAVE THESE PROBLEMS MADE IT FOR YOU TO DO YOUR WORK, TAKE CARE OF THINGS AT HOME, OR GET ALONG WITH OTHER PEOPLE: SOMEWHAT DIFFICULT
SUM OF ALL RESPONSES TO PHQ QUESTIONS 1-9: 15
SUM OF ALL RESPONSES TO PHQ QUESTIONS 1-9: 15

## 2025-05-06 ENCOUNTER — OFFICE VISIT (OUTPATIENT)
Dept: FAMILY MEDICINE | Facility: CLINIC | Age: 55
End: 2025-05-06
Payer: COMMERCIAL

## 2025-05-06 VITALS
RESPIRATION RATE: 18 BRPM | BODY MASS INDEX: 35.61 KG/M2 | WEIGHT: 201 LBS | TEMPERATURE: 99.4 F | DIASTOLIC BLOOD PRESSURE: 70 MMHG | HEART RATE: 110 BPM | HEIGHT: 63 IN | OXYGEN SATURATION: 96 % | SYSTOLIC BLOOD PRESSURE: 132 MMHG

## 2025-05-06 DIAGNOSIS — I10 HYPERTENSION GOAL BP (BLOOD PRESSURE) < 140/90: ICD-10-CM

## 2025-05-06 DIAGNOSIS — Z12.31 VISIT FOR SCREENING MAMMOGRAM: ICD-10-CM

## 2025-05-06 DIAGNOSIS — E78.5 HYPERLIPIDEMIA LDL GOAL <160: ICD-10-CM

## 2025-05-06 DIAGNOSIS — Z12.11 ENCOUNTER FOR SCREENING FOR MALIGNANT NEOPLASM OF COLON: ICD-10-CM

## 2025-05-06 DIAGNOSIS — L98.9 SKIN LESION: ICD-10-CM

## 2025-05-06 DIAGNOSIS — F33.1 MAJOR DEPRESSIVE DISORDER, RECURRENT EPISODE, MODERATE (H): Primary | ICD-10-CM

## 2025-05-06 DIAGNOSIS — H81.10 BENIGN PAROXYSMAL POSITIONAL VERTIGO, UNSPECIFIED LATERALITY: ICD-10-CM

## 2025-05-06 LAB — HOLD SPECIMEN: NORMAL

## 2025-05-06 PROCEDURE — 99417 PROLNG OP E/M EACH 15 MIN: CPT | Performed by: PHYSICIAN ASSISTANT

## 2025-05-06 PROCEDURE — 99215 OFFICE O/P EST HI 40 MIN: CPT | Performed by: PHYSICIAN ASSISTANT

## 2025-05-06 PROCEDURE — 96127 BRIEF EMOTIONAL/BEHAV ASSMT: CPT | Performed by: PHYSICIAN ASSISTANT

## 2025-05-06 PROCEDURE — 3078F DIAST BP <80 MM HG: CPT | Performed by: PHYSICIAN ASSISTANT

## 2025-05-06 PROCEDURE — 3075F SYST BP GE 130 - 139MM HG: CPT | Performed by: PHYSICIAN ASSISTANT

## 2025-05-06 RX ORDER — ALBUTEROL SULFATE 90 UG/1
1-2 INHALANT RESPIRATORY (INHALATION)
COMMUNITY
Start: 2025-04-03

## 2025-05-06 RX ORDER — HYDROCHLOROTHIAZIDE 25 MG/1
12.5 TABLET ORAL DAILY
Qty: 45 TABLET | Refills: 3 | Status: SHIPPED | OUTPATIENT
Start: 2025-05-06

## 2025-05-06 RX ORDER — BUPROPION HYDROCHLORIDE 150 MG/1
150 TABLET ORAL EVERY MORNING
Qty: 30 TABLET | Refills: 0 | Status: SHIPPED | OUTPATIENT
Start: 2025-05-06

## 2025-05-06 RX ORDER — METHOCARBAMOL 750 MG/1
TABLET, FILM COATED ORAL
COMMUNITY

## 2025-05-06 NOTE — PATIENT INSTRUCTIONS
Other counseling options:     [facility]      Dermatology:     [facility]

## 2025-05-06 NOTE — PROGRESS NOTES
Assessment & Plan     (Z12.31) Visit for screening mammogram  (primary encounter diagnosis)  Comment:   Plan: MA Screening Bilateral w/ Jared            (I10) Hypertension goal BP (blood pressure) < 140/90  Comment: well controlled today. Due for labs. Medication refilled  Plan: BASIC METABOLIC PANEL, hydrochlorothiazide         (HYDRODIURIL) 25 MG tablet            (E78.5) Hyperlipidemia LDL goal <160  Comment:   Plan: Lipid panel reflex to direct LDL Non-fasting            (F33.1) Major depressive disorder, recurrent episode, moderate (H)  Comment: has only ever been on 2 medications (paxil long ago, celexa recently). I think we could definitely pursue another selective serotonin reuptake inhibitor but we also discussed wellbutrin and possibly at some point maybe even the benefit of both. After discussing the various options, possible pros/cons and so forth patient wanted to first start with wellbutrin. She doesn't feel there is a strong anxiety component but we did discuss that sometimes if there is, this medication can make people feel a little more 'on edge' so to monitor for this. I asked her to check back with me as needed should she have any possible side effects but would definitely like her to check back with me in 3-4 weeks at the latest so we can see how things are going and decide if any changes or adjustments to the medication need to be made  Plan: buPROPion (WELLBUTRIN XL) 150 MG 24 hr tablet,         Adult Mental Health  Referral            (L98.9) Skin lesion  Comment: given location and appearance, would like her to see dermatology to r/o BCC. They can also address the telangectasia - ? rosacea  Plan: Adult Dermatology  Referral            (H81.10) Benign paroxysmal positional vertigo, unspecified laterality  Comment: interestingly sounds very seasonal in that she states she never gets episodes outside of spring/fall. Although allergy symptoms not severe I could argue that even  "mild congestion in someone who is susceptible to issues with crystaliths getting 'out of place' could cause the vertigo.   Plan: would continue zyrtec as it seemed to help at least a little more than claritin. Could try zyrtec -D this next allergy season (she feels she is through the spring stuff). Advised that she start this ideally 1-2 weeks before she gets into full blown allergy season. She will contact me sharron Sept to start    (Z12.11) Encounter for screening for malignant neoplasm of colon  Comment: we discussed the different options for screening and emphasized that a test is better than no test. We discussed the concerns raised by her  and the accuracy in the claims that they are generally 'unsafe' procedures  Plan: Colonoscopy Screening  Referral          While we discussed the options of FIT and cologuard, after our discussion she is going to schedule the colonoscopy              Nicotine/Tobacco Cessation  She reports that she has been smoking cigarettes. She has a 10.5 pack-year smoking history. She has never used smokeless tobacco.      BMI  Estimated body mass index is 35.61 kg/m  as calculated from the following:    Height as of this encounter: 1.6 m (5' 3\").    Weight as of this encounter: 91.2 kg (201 lb).     Depression Screening Follow Up        5/5/2025    11:34 AM   PHQ   PHQ-9 Total Score 15    Q9: Thoughts of better off dead/self-harm past 2 weeks Several days   F/U: Thoughts of suicide or self-harm No   F/U: Safety concerns No       Patient-reported                  No data to display                    Follow Up Actions Taken  Crisis resource information provided in the After Visit Summary  Mental Health Referral placed    Discussed the following ways the patient can remain in a safe environment:   she does not have anything to harm herself, no thoughts of doing such, and has a support system she can call upon if needed        Subjective   Rosemarie is a 55 year old, presenting " for the following health issues:  Depression        5/6/2025     9:48 AM   Additional Questions   Roomed by MICK Bey     Via the Health Maintenance questionnaire, the patient has reported the following services have been completed -Mammogram: [place] 2023-08-25, this information has not been sent to the abstraction team.  History of Present Illness       Back Pain:  She presents for follow up of back pain. Patient's back pain is a recurring problem.  Location of back pain:  Left lower back, right middle of back, right buttock and left buttock  Description of back pain: burning, sharp, shooting and stabbing  Back pain spreads: right buttocks, left buttocks and left foot    Since patient first noticed back pain, pain is: always present, but gets better and worse  Does back pain interfere with her job:  No       Mental Health Follow-up:  Patient presents to follow-up on Depression & Anxiety.Patient's depression since last visit has been:  Medium  The patient is not having other symptoms associated with depression.  Patient's anxiety since last visit has been:  Medium  The patient is not having other symptoms associated with anxiety.  Any significant life events: No  Patient is not feeling anxious or having panic attacks.  Patient has concerns about alcohol or drug use.    Reason for visit:  Vertigo, breast skin issue, nose sore, facial veins, sciatic nerve/back pain, weight, medications  Symptom onset:  More than a month  Symptoms include:  Ertigo, breast skin issue, nose sore, facial veins, sciatic nerve/back pain, weight, medications  Symptom intensity:  Moderate  Symptom progression:  Staying the same  Had these symptoms before:  Yes  Has tried/received treatment for these symptoms:  No   She is taking medications regularly.        Several concerns today a she has not been seen recently    Main concern is mental health  She is feeling very irritable, impatient, quick to anger and frustration. Does not feel like  herself  Says son is doing very well, work/job is going well, she and her  have a good relationship although did follow this with comments that much of her frustration does stem from some of the communication between them  She feels she has 'coddled' him for their many years of marriage - if he leaves a mess or doesn't do something she asked she would just do it for him. He is not good about doing dishes and she is finding recently that it is really angering her and even when he does them, they still have grease on them or are not done well so she has to go back and re do it  She feels like she works long hours and while she tries to carve out time for herself she ends up going right from work to work at home and ends up with no time to herself  She and her  do talk and she has mentioned her concerns to him in the past but he tends to get very defensive and acts like he is being attacked  They have not had sex in 4 years and she knows this is not good.   She has thought about marriage counseling and feels he would be open to it as well as he has made comments about not wanting to lose their relationship    She was on celexa up until 2 months ago when she stopped because she didn't feel it was helping  She had increased up to 40mg (we had left it at 30mg) just to see if that would help but it did nothing so she weaned herself down and then off  While things were not 'great' on the celexa she does admit they are worse since being off    The only other medication she has tried (several years ago) was paxil and she remembers not liking that one     2. Vertigo  Was never an issue until 15 years ago when she went jay diving   Now will get episodes that will last 2 days at a time but notes they are very seasonal only happening in the spring (March, April) and fall (October)  Knowing this she feels they are allergy related  She did see several specialists regarding this as well but they could not find any  "specific cause  She was on claritin in the past but didn't feel it made a different  During one of her visits re: dizziness someone suggested she switch to zyrtec and she did use that this last season and feels it did work better     3. Spot on her nose   Has been present for a few years - doesn't really seem to be changing?   Also noticing more vessels that she can see in her cheeks  Does get red cheeks very easily and often - uses make up to cover it up    4. Chronic intermittent low back issues/weight  She really feels that if she can get back to exercise and taking care of herself this will get better  The back is the same as it has been - she will get a 'flare' and have to focus on the stretches and rehab to get it back to baseline    5. Due for mammogram/colonoscpy  She had the colonoscopy scheduled but then cancelled it because her  scared her in telling stories of perforations and procedure related events                   Review of Systems  Constitutional, HEENT, cardiovascular, pulmonary, GI, , musculoskeletal, neuro, skin, endocrine and psych systems are negative, except as otherwise noted.      Objective    /70   Pulse 110   Temp 99.4  F (37.4  C) (Tympanic)   Resp 18   Ht 1.6 m (5' 3\")   Wt 91.2 kg (201 lb)   LMP 04/16/2019   SpO2 96%   BMI 35.61 kg/m    Body mass index is 35.61 kg/m .  Physical Exam   GENERAL: alert and no distress  EYES: Eyes grossly normal to inspection  HENT: ear canals and TM's normal, nose and mouth without ulcers or lesions  RESP: lungs clear to auscultation - no rales, rhonchi or wheezes  CV: regular rates and rhythm and no peripheral edema  SKIN: there is a small darker area of skin on the left side of the nostril - hard to appreciate true appearance as she has it covered with make up but flat and possibly fine scale? She does have some faint telangectasia noted on cheeks and chin  PSYCH: mentation appears normal ; she is tearful but still pleasant, " making good conversation and judgement/insight intact. No suicidal thoughts/ideation. Denies self harm    Diagnostic Tests: pending        68 minutes was spent in the visit with patient as she had multiple concerns to address and we did spend time getting through all of them and formulating a new therapy/treatment plan.   Signed Electronically by: Mervat Ulloa PA-C

## 2025-05-07 ENCOUNTER — PATIENT OUTREACH (OUTPATIENT)
Dept: CARE COORDINATION | Facility: CLINIC | Age: 55
End: 2025-05-07
Payer: COMMERCIAL

## 2025-05-27 ENCOUNTER — MYC REFILL (OUTPATIENT)
Dept: FAMILY MEDICINE | Facility: CLINIC | Age: 55
End: 2025-05-27
Payer: COMMERCIAL

## 2025-05-27 DIAGNOSIS — F33.1 MAJOR DEPRESSIVE DISORDER, RECURRENT EPISODE, MODERATE (H): ICD-10-CM

## 2025-05-27 RX ORDER — BUPROPION HYDROCHLORIDE 150 MG/1
150 TABLET ORAL EVERY MORNING
Qty: 90 TABLET | Refills: 3 | Status: SHIPPED | OUTPATIENT
Start: 2025-05-27

## 2025-08-07 ENCOUNTER — MYC MEDICAL ADVICE (OUTPATIENT)
Dept: FAMILY MEDICINE | Facility: CLINIC | Age: 55
End: 2025-08-07
Payer: COMMERCIAL

## 2025-08-07 DIAGNOSIS — F33.1 MAJOR DEPRESSIVE DISORDER, RECURRENT EPISODE, MODERATE (H): ICD-10-CM

## 2025-08-07 RX ORDER — BUPROPION HYDROCHLORIDE 300 MG/1
300 TABLET ORAL EVERY MORNING
Qty: 90 TABLET | Refills: 1 | Status: SHIPPED | OUTPATIENT
Start: 2025-08-07

## 2025-08-20 ENCOUNTER — MYC MEDICAL ADVICE (OUTPATIENT)
Dept: FAMILY MEDICINE | Facility: CLINIC | Age: 55
End: 2025-08-20
Payer: COMMERCIAL

## 2025-08-20 DIAGNOSIS — J30.2 SEASONAL ALLERGIC RHINITIS, UNSPECIFIED TRIGGER: Primary | ICD-10-CM

## 2025-08-21 RX ORDER — CETIRIZINE HYDROCHLORIDE, PSEUDOEPHEDRINE HYDROCHLORIDE 5; 120 MG/1; MG/1
1 TABLET, FILM COATED, EXTENDED RELEASE ORAL 2 TIMES DAILY
Qty: 180 TABLET | Refills: 1 | Status: SHIPPED | OUTPATIENT
Start: 2025-08-21

## (undated) DEVICE — ESU FCP OLYMPUS PK CUTTING 5MMX33CM PK-CF0533

## (undated) DEVICE — DECANTER BAG 2002S

## (undated) DEVICE — SYR 10ML FINGER CONTROL W/O NDL 309695

## (undated) DEVICE — EVAC SYSTEM CLEAR FLOW SC082500

## (undated) DEVICE — SUCTION CANISTER MEDIVAC LINER 3000ML W/LID 65651-530

## (undated) DEVICE — ENDO MORCELLATOR OLYMPUS PNEUMOLINER WA90500US

## (undated) DEVICE — TUBING C02 INSUFFLATION LAP FILTER HEATER 6198

## (undated) DEVICE — SOL WATER IRRIG 1000ML BOTTLE 2F7114

## (undated) DEVICE — WIPES FOLEY CARE SURESTEP PROVON DFC100

## (undated) DEVICE — SU MONOCRYL 4-0 PS-2 18" UND Y496G

## (undated) DEVICE — SOL NACL 0.9% INJ 250ML BAG 2B1322Q

## (undated) DEVICE — NDL SPINAL 22GA 3.5" QUINCKE 405181

## (undated) DEVICE — SYSTEM CLEARIFY VISUALIZATION 21-345

## (undated) DEVICE — SUCTION IRR STRYKERFLOW II W/TIP 250-070-520

## (undated) DEVICE — PREP DURAPREP 26ML APL 8630

## (undated) DEVICE — LINEN TOWEL PACK X5 5464

## (undated) DEVICE — Device

## (undated) DEVICE — GLOVE PROTEXIS POWDER FREE SMT 7.5  2D72PT75X

## (undated) DEVICE — SU VICRYL 0 UR-6 27" J603H

## (undated) DEVICE — SYR 30ML SLIP TIP W/O NDL 302833

## (undated) DEVICE — CATH TRAY FOLEY SURESTEP 16FR W/URINE MTR STATLK LF A303416A

## (undated) DEVICE — GLOVE PROTEXIS W/NEU-THERA 6.5  2D73TE65

## (undated) DEVICE — SOL NACL 0.9% INJ 1000ML BAG 2B1324X

## (undated) DEVICE — ESU HANDPIECE BIPOLAR THUNDERBEAT FC 5MMX35CM TB-0535FC

## (undated) DEVICE — ENDO TROCAR FIRST ENTRY KII FIOS ADV FIX 05X100MM CFF03

## (undated) DEVICE — MORCELLATOR LAPAROSCOPIC LINA XCISE MOR-1515-6

## (undated) DEVICE — ESU HANDPIECE OLYMPUS PK SPATULA PK-SP0533

## (undated) DEVICE — GLOVE PROTEXIS W/NEU-THERA 6.0  2D73TE60

## (undated) DEVICE — SOL WATER 3000ML BAG 7973-08

## (undated) DEVICE — SYR 20ML SLIP TIP W/O NDL 302831

## (undated) DEVICE — SOL NACL 0.9% IRRIG 1000ML BOTTLE 2F7124

## (undated) DEVICE — NDL 19GA 1.5"

## (undated) DEVICE — TROCAR TRIPORT 15 OLYMPUS SINGLE ACCESS WA58015T

## (undated) DEVICE — ESU HOLDER LAP INST DISP PURPLE LONG 330MM H-PRO-330

## (undated) DEVICE — SYR 03ML LL W/O NDL 309657

## (undated) DEVICE — JELLY LUBRICATING 10ML SYR STERILE

## (undated) RX ORDER — HYDROMORPHONE HYDROCHLORIDE 1 MG/ML
INJECTION, SOLUTION INTRAMUSCULAR; INTRAVENOUS; SUBCUTANEOUS
Status: DISPENSED
Start: 2019-04-18

## (undated) RX ORDER — FENTANYL CITRATE 50 UG/ML
INJECTION, SOLUTION INTRAMUSCULAR; INTRAVENOUS
Status: DISPENSED
Start: 2019-04-18

## (undated) RX ORDER — PROPOFOL 10 MG/ML
INJECTION, EMULSION INTRAVENOUS
Status: DISPENSED
Start: 2019-04-18

## (undated) RX ORDER — ONDANSETRON 2 MG/ML
INJECTION INTRAMUSCULAR; INTRAVENOUS
Status: DISPENSED
Start: 2019-04-18

## (undated) RX ORDER — ACETAMINOPHEN 325 MG/1
TABLET ORAL
Status: DISPENSED
Start: 2019-04-18

## (undated) RX ORDER — LIDOCAINE HYDROCHLORIDE 20 MG/ML
INJECTION, SOLUTION EPIDURAL; INFILTRATION; INTRACAUDAL; PERINEURAL
Status: DISPENSED
Start: 2019-04-18

## (undated) RX ORDER — DEXAMETHASONE SODIUM PHOSPHATE 4 MG/ML
INJECTION, SOLUTION INTRA-ARTICULAR; INTRALESIONAL; INTRAMUSCULAR; INTRAVENOUS; SOFT TISSUE
Status: DISPENSED
Start: 2019-04-18

## (undated) RX ORDER — PHENAZOPYRIDINE HYDROCHLORIDE 200 MG/1
TABLET, FILM COATED ORAL
Status: DISPENSED
Start: 2019-04-18

## (undated) RX ORDER — CEFAZOLIN SODIUM 2 G/100ML
INJECTION, SOLUTION INTRAVENOUS
Status: DISPENSED
Start: 2019-04-18

## (undated) RX ORDER — GLYCOPYRROLATE 0.2 MG/ML
INJECTION, SOLUTION INTRAMUSCULAR; INTRAVENOUS
Status: DISPENSED
Start: 2019-04-18

## (undated) RX ORDER — OXYCODONE HYDROCHLORIDE 5 MG/1
TABLET ORAL
Status: DISPENSED
Start: 2019-04-18

## (undated) RX ORDER — KETOROLAC TROMETHAMINE 30 MG/ML
INJECTION, SOLUTION INTRAMUSCULAR; INTRAVENOUS
Status: DISPENSED
Start: 2019-04-18

## (undated) RX ORDER — SCOLOPAMINE TRANSDERMAL SYSTEM 1 MG/1
PATCH, EXTENDED RELEASE TRANSDERMAL
Status: DISPENSED
Start: 2019-04-18